# Patient Record
Sex: FEMALE | Race: BLACK OR AFRICAN AMERICAN | NOT HISPANIC OR LATINO | Employment: UNEMPLOYED | URBAN - METROPOLITAN AREA
[De-identification: names, ages, dates, MRNs, and addresses within clinical notes are randomized per-mention and may not be internally consistent; named-entity substitution may affect disease eponyms.]

---

## 2017-05-10 ENCOUNTER — OFFICE VISIT (OUTPATIENT)
Dept: FAMILY MEDICINE | Facility: CLINIC | Age: 57
End: 2017-05-10

## 2017-05-10 VITALS
TEMPERATURE: 98.5 F | HEIGHT: 70 IN | WEIGHT: 277 LBS | OXYGEN SATURATION: 97 % | HEART RATE: 83 BPM | BODY MASS INDEX: 39.65 KG/M2 | SYSTOLIC BLOOD PRESSURE: 104 MMHG | DIASTOLIC BLOOD PRESSURE: 73 MMHG

## 2017-05-10 DIAGNOSIS — R06.01 ORTHOPNEA: ICD-10-CM

## 2017-05-10 DIAGNOSIS — I48.91 ATRIAL FIBRILLATION, UNSPECIFIED TYPE (H): Primary | ICD-10-CM

## 2017-05-10 DIAGNOSIS — R07.9 CHEST PAIN, UNSPECIFIED TYPE: ICD-10-CM

## 2017-05-10 DIAGNOSIS — R06.09 DYSPNEA ON EXERTION: ICD-10-CM

## 2017-05-10 NOTE — PROGRESS NOTES
"Subjective  Michelle Shetty is a 56 year old female with a history including A. Fib/Flutter, CHF who presents with worsening chest pain and shortness of breath. She states over the past several months she has had worsening shortness of breath with exertion and when laying flat. Dyspnea with walking only a few steps. In the past week she has felt like an \"elephant is sitting on her chest\" intermittently. She feels short of breath when laying down, even at an 45 deg angle, which has prevented her from sleeping well. She was previously prescribed Xarelto and Metoprolol for her A.fib in 2015 but has not taken it since 2015. She has not taken any medications or follow up with her PCP in 2 years.    She denies having chest pain currently but does endorse severe shortness of breath with associated fatigue and generalized malaise. Endorses occasional wheezing. Smokes 1 ppd.     ROS: Denies cough, fever. Denies abdominal pain, N/V. Reports mild lower extremity swelling. Denies headache.    Social: 30+ pack year smoker.    Objective  Vitals: /73  Pulse 83  Temp 98.5  F (36.9  C)  Ht 5' 10\" (177.8 cm)  Wt 277 lb (125.6 kg)  SpO2 97%  Breastfeeding? No  BMI 39.75 kg/m2  General: Middle-aged woman. Obese. Mild distress 2/2 to dyspnea.  HEENT: Moist mucous membranes. Sclera non-injected. Poor dentition. Oropharynx without swelling, erythema, or exudate. Anterior cervical LAD.   Heart: Irregularly irregular rate and rhythm. ~150 bpm.  Extremities: Trace lower extremity edema.   Lungs: Prolonged expiration. Clear to auscultation. Diminished at bases.  GI: Obese abdomen. No ridigidity, distension, or guarding.    EKG: A. Flutter with 2nd degree AV block. Rate: 95, QTc: 516     Assessment & Plan  Michelle is a 57 yo female with a hx of A. Fib and CHF presenting with acute worsening of shortness of breath and chest pain. She has not been taking her medications for the past 2 years.     Atrial flutter;  Dyspnea on " exertion, Orthopnea;  Chest Pain:  EKG shows atrial flutter with 2nd degree AV block.  No ST segment elevations. Patient's rate fluctuated between + bpm on exam. No hypoxia. Concern for A. Fib with RVR. Concern for PE, CHF exacerbation.     EMS called and patient transported directly to [facility] Emergency Dept.     Return to clinic for follow up after hospital visit.    Patient precepted with Dr. Castillo.    Kasia Gallegos DO   PGY-1 [facility]  Pager: [phone]

## 2017-05-10 NOTE — MR AVS SNAPSHOT
After Visit Summary   5/10/2017    Michelle Shetty    MRN: [id number]           Patient Information     Date Of Birth          1960        Visit Information        Provider Department      5/10/2017 3:50 PM Kasia Gallegos MD [facility]        Today's Diagnoses     Atrial fibrillation, unspecified type (H)    -  1    Dyspnea on exertion        Orthopnea        Chest pain, unspecified type           Follow-ups after your visit        Who to contact     Please call your clinic at [phone] to:    Ask questions about your health    Make or cancel appointments    Discuss your medicines    Learn about your test results    Speak to your doctor   If you have compliments or concerns about an experience at your clinic, or if you wish to file a complaint, please contact [facility] Patient Relations at [phone] or email us at [email or web address]         Additional Information About Your Visit        MyChart Information     Front Row is an electronic gateway that provides easy, online access to your medical records. With Front Row, you can request a clinic appointment, read your test results, renew a prescription or communicate with your care team.     To sign up for Tagorizet visit the website at [email or web address]   You will be asked to enter the access code listed below, as well as some personal information. Please follow the directions to create your username and password.     Your access code is: [email or web address]  Expires: 8/10/2017  1:36 PM     Your access code will  in 90 days. If you need help or a new code, please contact your [facility] or call [phone] for assistance.        Care EveryWhere ID     This is your Care EveryWhere ID. This could be used by other organizations to access your [place] medical records  [id number]        Your Vitals Were     Pulse Temperature Height Pulse Oximetry  "Breastfeeding? BMI (Body Mass Index)    83 98.5  F (36.9  C) 5' 10\" (177.8 cm) 97% No 39.75 kg/m2       Blood Pressure from Last 3 Encounters:   05/10/17 104/73   09/04/15 115/78   08/25/15 114/87    Weight from Last 3 Encounters:   05/10/17 277 lb (125.6 kg)   09/04/15 269 lb (122 kg)   08/25/15 277 lb (125.6 kg)              Today, you had the following     No orders found for display       Primary Care Provider Office Phone # Fax #    Halima BLANCA Norton [phone]       [facility] [address] [place] [address]        Thank you!     Thank you for choosing [facility]  for your care. Our goal is always to provide you with excellent care. Hearing back from our patients is one way we can continue to improve our services. Please take a few minutes to complete the written survey that you may receive in the mail after your visit with us. Thank you!             Your Updated Medication List - Protect others around you: Learn how to safely use, store and throw away your medicines at www.disposemymeds.org.          This list is accurate as of: 5/10/17 11:59 PM.  Always use your most recent med list.                   Brand Name Dispense Instructions for use    * furosemide 20 MG tablet    LASIX    30 tablet    Take 1 tablet (20 mg) by mouth daily       * furosemide 40 MG tablet    LASIX    60 tablet    Take 1 tablet (40 mg) by mouth daily       lisinopril 5 MG tablet    PRINIVIL/ZESTRIL    90 tablet    Take 1 tablet (5 mg) by mouth daily       melatonin 3 MG tablet     90 tablet    Take 1 tablet (3 mg) by mouth nightly as needed for sleep       metoprolol 100 MG 24 hr tablet    TOPROL-XL    90 tablet    Take 1 tablet (100 mg) by mouth daily Take daily.  You can take an extra dose after 4 hours if your heart rate remains greater than 120 once daily       potassium chloride SA 20 MEQ CR tablet    potassium chloride    90 tablet    Take 1 tablet (20 mEq) by mouth daily       rivaroxaban " ANTICOAGULANT 20 MG Tabs tablet    XARELTO    90 tablet    Take 1 tablet (20 mg) by mouth daily (with dinner) TAKE 1 TABLET BY MOUTH EVERY DAY WITH SUPPER       * Notice:  This list has 2 medication(s) that are the same as other medications prescribed for you. Read the directions carefully, and ask your doctor or other care provider to review them with you.

## 2017-05-10 NOTE — PROGRESS NOTES
Preceptor attestation:  Patient seen and discussed with the resident. Assessment and plan reviewed with resident and agreed upon.  Supervising physician: Liam Castillo  [facility]

## 2017-05-15 ENCOUNTER — TELEPHONE (OUTPATIENT)
Dept: FAMILY MEDICINE | Facility: CLINIC | Age: 57
End: 2017-05-15

## 2017-05-15 NOTE — TELEPHONE ENCOUNTER
Date of discharge: 05/12/2017  Facility of discharge: [facility]  Patient concerns about condition: No concerns at this time.  Patient concerns about medications: No concerns at this time.  Full med reconciliation will be completed at clinic visit.  Patient concerns about transitioning: No concerns at this time.  Clinic office visit appointment date: 05/19/2017  Patient reminded to bring all medications (prescription and over-the-counter) to clinic appointment: Yes    Using the date of discharge as day 1, the 30th day post discharge is 06/10/2017.

## 2017-05-19 ENCOUNTER — OFFICE VISIT (OUTPATIENT)
Dept: FAMILY MEDICINE | Facility: CLINIC | Age: 57
End: 2017-05-19

## 2017-05-19 ENCOUNTER — OFFICE VISIT (OUTPATIENT)
Dept: PHARMACY | Facility: CLINIC | Age: 57
End: 2017-05-19

## 2017-05-19 VITALS
OXYGEN SATURATION: 99 % | BODY MASS INDEX: 38.48 KG/M2 | HEART RATE: 68 BPM | WEIGHT: 268.2 LBS | HEART RATE: 68 BPM | TEMPERATURE: 97.8 F | OXYGEN SATURATION: 99 % | TEMPERATURE: 97.8 F | SYSTOLIC BLOOD PRESSURE: 116 MMHG | SYSTOLIC BLOOD PRESSURE: 116 MMHG | DIASTOLIC BLOOD PRESSURE: 79 MMHG | WEIGHT: 268.2 LBS | BODY MASS INDEX: 38.48 KG/M2 | DIASTOLIC BLOOD PRESSURE: 79 MMHG

## 2017-05-19 DIAGNOSIS — R06.83 SNORING: ICD-10-CM

## 2017-05-19 DIAGNOSIS — R93.89 ABNORMAL FINDING ON IMAGING: ICD-10-CM

## 2017-05-19 DIAGNOSIS — I50.32 CHRONIC DIASTOLIC HEART FAILURE (H): ICD-10-CM

## 2017-05-19 DIAGNOSIS — I50.22 CHRONIC SYSTOLIC CONGESTIVE HEART FAILURE (H): Primary | ICD-10-CM

## 2017-05-19 DIAGNOSIS — I48.0 PAROXYSMAL ATRIAL FIBRILLATION (H): ICD-10-CM

## 2017-05-19 DIAGNOSIS — I48.92 ATRIAL FLUTTER, UNSPECIFIED TYPE (H): ICD-10-CM

## 2017-05-19 LAB — TSH SERPL DL<=0.05 MIU/L-ACNC: 1.63 UIU/ML (ref 0.3–5)

## 2017-05-19 RX ORDER — LISINOPRIL 5 MG/1
5 TABLET ORAL DAILY
Qty: 90 TABLET | Refills: 3 | Status: SHIPPED | OUTPATIENT
Start: 2017-05-19 | End: 2017-06-13

## 2017-05-19 RX ORDER — SAW/PYGEUM/BETA/HERB/D3/B6/ZN 30 MG-25MG
1 CAPSULE ORAL
COMMUNITY
Start: 2017-05-19 | End: 2023-01-10

## 2017-05-19 RX ORDER — FUROSEMIDE 40 MG
40 TABLET ORAL DAILY PRN
Qty: 60 TABLET | Refills: 3 | Status: SHIPPED | OUTPATIENT
Start: 2017-05-19 | End: 2017-06-13

## 2017-05-19 RX ORDER — TRAZODONE HYDROCHLORIDE 50 MG/1
50 TABLET, FILM COATED ORAL
Qty: 90 TABLET | Refills: 3 | COMMUNITY
Start: 2017-05-19 | End: 2017-06-13

## 2017-05-19 RX ORDER — ASPIRIN 81 MG/1
81 TABLET, CHEWABLE ORAL DAILY
Qty: 108 TABLET | Refills: 3 | COMMUNITY
Start: 2017-05-19 | End: 2017-06-13

## 2017-05-19 NOTE — LETTER
May 23, 2017      Michelle Shetty  [address]  [place] [address]        Dear Michelle,    Please see below for your test results.  Normal.     Resulted Orders   Thyroid [employer] ([employer])   Result Value Ref Range    TSH 1.63 0.30 - 5.00 uIU/mL    Narrative    Test performed by:  [facility]  [address] [place] [address]       If you have any questions, please call the clinic to make an appointment.    Sincerely,    Ari Cruz MD

## 2017-05-19 NOTE — MR AVS SNAPSHOT
After Visit Summary   5/19/2017    Michelle Shetty    MRN: [id number]           Patient Information     Date Of Birth          1960        Visit Information        Provider Department      5/19/2017 2:10 PM Joi Muñiz, [facility]        Today's Diagnoses     Atrial flutter, unspecified type (H)        Chronic diastolic heart failure (H)           Follow-ups after your visit        Your next 10 appointments already scheduled     Jun 13, 2017 10:20 AM CDT   Return Visit with Ari Cruz MD   [facility]    [address] [place] [id number]   [phone]              Future tests that were ordered for you today     Open Future Orders        Priority Expected Expires Ordered    Sleep Study Referral Routine  5/19/2018 5/19/2017    US ABDOMEN LIMITED Routine  5/19/2018 5/19/2017            Who to contact     Please call your clinic at [phone] to:    Ask questions about your health    Make or cancel appointments    Discuss your medicines    Learn about your test results    Speak to your doctor   If you have compliments or concerns about an experience at your clinic, or if you wish to file a complaint, please contact [facility] Patient Relations at [phone] or email us at [email or web address]         Additional Information About Your Visit        MyChart Information     OpenSkyt is an electronic gateway that provides easy, online access to your medical records. With webme, you can request a clinic appointment, read your test results, renew a prescription or communicate with your care team.     To sign up for OpenSkyt visit the website at [email or web address]   You will be asked to enter the access code listed below, as well as some personal information. Please follow the directions to create your username and password.     Your access code is: [email or web address]  Expires: 8/10/2017  1:36 PM     Your access code  will  in 90 days. If you need help or a new code, please contact your [facility] or call [phone] for assistance.        Care EveryWhere ID     This is your Care EveryWhere ID. This could be used by other organizations to access your [place] medical records  [id number]        Your Vitals Were     Pulse Temperature Pulse Oximetry BMI (Body Mass Index)          68 97.8  F (36.6  C) (Oral) 99% 38.48 kg/m2         Blood Pressure from Last 3 Encounters:   17 116/79   17 116/79   05/10/17 104/73    Weight from Last 3 Encounters:   17 268 lb 3.2 oz (121.7 kg)   17 268 lb 3.2 oz (121.7 kg)   05/10/17 277 lb (125.6 kg)              Today, you had the following     No orders found for display         Today's Medication Changes          These changes are accurate as of: 17  5:13 PM.  If you have any questions, ask your nurse or doctor.               These medicines have changed or have updated prescriptions.        Dose/Directions    furosemide 40 MG tablet   Commonly known as:  LASIX   This may have changed:    - medication strength  - how much to take  - when to take this  - reasons to take this  - Another medication with the same name was removed. Continue taking this medication, and follow the directions you see here.   Used for:  Chronic systolic congestive heart failure (H)   Changed by:  Ari Cruz MD        Dose:  40 mg   Take 1 tablet (40 mg) by mouth daily as needed   Quantity:  60 tablet   Refills:  3       Melatonin 10 MG Tbcr   This may have changed:  Another medication with the same name was removed. Continue taking this medication, and follow the directions you see here.   Changed by:  Joi Muñiz [facility]        Dose:  1 tablet   Take 1 tablet by mouth nightly as needed   Refills:  0         Stop taking these medicines if you haven't already. Please contact your care team if you have questions.     potassium chloride SA 20 MEQ CR  tablet   Commonly known as:  potassium chloride   Stopped by:  Joi Muñiz [facility]                Where to get your medicines      These medications were sent to [facility]/pharmacy #[id number] - [place], [place] - [address] AT [facility]  [address] [place] [address]    Hours:  24-hours Phone:  [phone]     furosemide 40 MG tablet    lisinopril 5 MG tablet                Primary Care Provider Office Phone # Fax #    aHlima Norton [phone]       [facility] [address] [place] [address]        Thank you!     Thank you for choosing [facility]  for your care. Our goal is always to provide you with excellent care. Hearing back from our patients is one way we can continue to improve our services. Please take a few minutes to complete the written survey that you may receive in the mail after your visit with us. Thank you!             Your Updated Medication List - Protect others around you: Learn how to safely use, store and throw away your medicines at www.disposemymeds.org.          This list is accurate as of: 5/19/17  5:13 PM.  Always use your most recent med list.                   Brand Name Dispense Instructions for use    aspirin 81 MG chewable tablet     108 tablet    Take 1 tablet (81 mg) by mouth daily       furosemide 40 MG tablet    LASIX    60 tablet    Take 1 tablet (40 mg) by mouth daily as needed       lisinopril 5 MG tablet    PRINIVIL/ZESTRIL    90 tablet    Take 1 tablet (5 mg) by mouth daily       Melatonin 10 MG Tbcr      Take 1 tablet by mouth nightly as needed       metoprolol 100 MG 24 hr tablet    TOPROL-XL    90 tablet    Take 1 tablet (100 mg) by mouth daily Take daily.  You can take an extra dose after 4 hours if your heart rate remains greater than 120 once daily       rivaroxaban ANTICOAGULANT 20 MG Tabs tablet    XARELTO    90 tablet    Take 1 tablet (20 mg) by mouth daily (with dinner) TAKE 1 TABLET BY MOUTH EVERY  DAY WITH SUPPER       traZODone 50 MG tablet    DESYREL    90 tablet    Take 1 tablet (50 mg) by mouth nightly as needed for sleep

## 2017-05-19 NOTE — PATIENT INSTRUCTIONS
Atrial Flutter & Heart Failure  - Continue taking Metoprolol and Xarelto  - Start taking Lisinopril. This medication will help strengthen your heart.  - Take Lasix whenever you need to for shortness of breath or difficulty with activity. This includes if you have trouble breathing when laying down.  - Stop taking Aspirin and avoid taking Aleve. These medications will overly thin your blood. We encourage you to see a dentist to help your dental pain.    - Congratulations on quitting smoking! If you need help continuing to be tobacco free, please let us know.  - You should avoid all alcohol. Alcohol will overly thin your blood and may worsen your heart failure.  - Please take your weight daily. Weigh yourself first thing in the morning, before eating and with no clothing. If you gain 3 pounds in one day, or 6 pounds in one week, please call the clinic to let us know.  - Moderate exercise will help to improve your health and control your symptoms. Please consider a regular exercise regimen. If your symptoms worsen with exercise, please let us know.    - Please schedule a sleep study as soon as you can. Sleep apnea may worsen your heart failure.    Return to clinic in 2 weeks, or sooner if you develop new or worse symptoms.    Your referral information has been scheduled for:    [address] [address]  [phone]  Ultrasound  Date: Thursday May 25 2017   Time:  9:00 AM  Nothing to eat or drink 8 hours prior to exam    [facility]  [phone]  Referral has been faxed they will contact pt to schedule    PER PATIENT OK TO LVM WITH APPT./ AMADOU 1:26 PM 5/22/2017

## 2017-05-19 NOTE — PROGRESS NOTES
SUBJECTIVE:  Michelle Shetty comes in today to follow up on her hospitalization.  She last saw me about two years ago.  At that point, she had AFib.  She was started on metoprolol for rate control and Xarelto; somewhere along the way, she stopped taking both and didn't followup.  She presented to [facility] with dyspnea and was found to have AFib w/RVR, and she was admitted to the hospital.  She was ruled out for MI and PE.  Rate was controlled with metoprolol and she was started on Xarelto and she is in today to follow up.  Since she has been home, she is still having some orthopnea and some degree of PND.  She needs two pillows and is waking up once or twice during the night SOB.  She isn't having chest pain.  She isn't having dyspnea with moderate exertion.  She hasn't tried significant exertion since she has been home.     I reviewed and updated as necessary in Epic, the allergies, medications, and problem list.   REVIEW OF SYSTEMS:  No fevers or chills.  No headache.  No change in hearing.  No chest pain.  She doesn't appreciate palpitations.  Other review of systems as documented above.   OBJECTIVE:   VITAL SIGNS:  Noted in Epic.   GENERAL:  She is well-nourished and in no acute distress.   HEENT:  Within normal limits.   NECK:  Supple, w/out lymphadenopathy or stridor.  No JVD is appreciated.   LUNGS:  Clear to auscultation w/out wheezing or crackles.   HEART:  Heart seems to be regular, w/ occasional extra beats.   ABDOMEN:  Mildly obese, soft, and nontender.   EXTREMITIES:  Trace edema at the ankles bilaterally.   EKG does show AFib is rate-controlled.   ASSESSMENT/PLAN:   1.  AFib w/RVR, now in atrial flutter.  We'll continue metoprolol and Xarelto.   2.  Heart failure.  EF is around 40%.  We'll check TSH, and get her scheduled for a sleep study.  We'll increase Lasix to 40 mg daily and add lisinopril five mg daily.  We'll have her check daily weights.  We'll gave her typical instructions on low-salt,  moderate fluid intake, regular exercise, adherence to medications, etc.   3.  Abnormal liver finding on CTA of the pulmonary arteries.  We'll get an abdominal ultrasound.     We'll have her follow up in about two weeks' time.       Total of 40 minutes was spent in face to face contact with patient with > 50% in chart review, counseling and coordination of care.  Options for treatment and/or follow-up care were reviewed with the patient. Michelle Shetty was engaged and actively involved in the decision making process. She verbalized understanding of the options discussed and was satisfied with the final plan.      Ari Cruz

## 2017-05-19 NOTE — MR AVS SNAPSHOT
After Visit Summary   5/19/2017    Michelle Shetty    MRN: [id number]           Patient Information     Date Of Birth          1960        Visit Information        Provider Department      5/19/2017 2:30 PM Ari Cruz MD [facility]        Today's Diagnoses     Chronic systolic congestive heart failure (H)    -  1    Abnormal finding on imaging        Snoring        Paroxysmal atrial fibrillation (H)          Care Instructions    Atrial Flutter & Heart Failure  - Continue taking Metoprolol and Xarelto  - Start taking Lisinopril. This medication will help strengthen your heart.  - Take Lasix whenever you need to for shortness of breath or difficulty with activity. This includes if you have trouble breathing when laying down.  - Stop taking Aspirin and avoid taking Aleve. These medications will overly thin your blood. We encourage you to see a dentist to help your dental pain.    - Congratulations on quitting smoking! If you need help continuing to be tobacco free, please let us know.  - You should avoid all alcohol. Alcohol will overly thin your blood and may worsen your heart failure.  - Please take your weight daily. Weigh yourself first thing in the morning, before eating and with no clothing. If you gain 3 pounds in one day, or 6 pounds in one week, please call the clinic to let us know.  - Moderate exercise will help to improve your health and control your symptoms. Please consider a regular exercise regimen. If your symptoms worsen with exercise, please let us know.    - Please schedule a sleep study as soon as you can. Sleep apnea may worsen your heart failure.    Return to clinic in 2 weeks, or sooner if you develop new or worse symptoms.        Follow-ups after your visit        Additional Services     Sleep Study Referral       Please be aware that coverage of these services is subject to the terms and limitations of your health insurance plan.  Call member services at your  health plan with any benefit or coverage questions.      Please bring the following to your appointment:    >>   List of current medications   >>   This referral request   >>   Any documents/labs given to you for this referral                  Future tests that were ordered for you today     Open Future Orders        Priority Expected Expires Ordered    Sleep Study Referral Routine  2018    US ABDOMEN LIMITED Routine  2018            Who to contact     Please call your clinic at [phone] to:    Ask questions about your health    Make or cancel appointments    Discuss your medicines    Learn about your test results    Speak to your doctor   If you have compliments or concerns about an experience at your clinic, or if you wish to file a complaint, please contact [facility] Patient Relations at [phone] or email us at [email or web address]         Additional Information About Your Visit        UserVoiceharPlacemeter Information     [employer] is an electronic gateway that provides easy, online access to your medical records. With [employer], you can request a clinic appointment, read your test results, renew a prescription or communicate with your care team.     To sign up for [employer] visit the website at [email or web address]   You will be asked to enter the access code listed below, as well as some personal information. Please follow the directions to create your username and password.     Your access code is: [email or web address]  Expires: 8/10/2017  1:36 PM     Your access code will  in 90 days. If you need help or a new code, please contact your [facility] or call [phone] for assistance.        Care EveryWhere ID     This is your Care EveryWhere ID. This could be used by other organizations to access your [place] medical records  [id number]        Your Vitals Were     Pulse Temperature Pulse Oximetry BMI (Body Mass  Index)          68 97.8  F (36.6  C) (Oral) 99% 38.48 kg/m2         Blood Pressure from Last 3 Encounters:   05/19/17 116/79   05/19/17 116/79   05/10/17 104/73    Weight from Last 3 Encounters:   05/19/17 268 lb 3.2 oz (121.7 kg)   05/19/17 268 lb 3.2 oz (121.7 kg)   05/10/17 277 lb (125.6 kg)              We Performed the Following     EKG 12-lead complete w/read - Clinics     Thyroid [place] ([facility])          Today's Medication Changes          These changes are accurate as of: 5/19/17  4:15 PM.  If you have any questions, ask your nurse or doctor.               These medicines have changed or have updated prescriptions.        Dose/Directions    * furosemide 20 MG tablet   Commonly known as:  LASIX   This may have changed:  Another medication with the same name was added. Make sure you understand how and when to take each.   Used for:  Unspecified diastolic heart failure   Changed by:  Halima Norton MD        Dose:  20 mg   Take 1 tablet (20 mg) by mouth daily   Quantity:  30 tablet   Refills:  0       * furosemide 40 MG tablet   Commonly known as:  LASIX   This may have changed:  Another medication with the same name was added. Make sure you understand how and when to take each.   Used for:  Dyspnea on exertion   Changed by:  Halima Norton MD        Dose:  40 mg   Take 1 tablet (40 mg) by mouth daily   Quantity:  60 tablet   Refills:  1       * furosemide 40 MG tablet   Commonly known as:  LASIX   This may have changed:  You were already taking a medication with the same name, and this prescription was added. Make sure you understand how and when to take each.   Used for:  Chronic systolic congestive heart failure (H)   Changed by:  Ari Cruz MD        Dose:  40 mg   Take 1 tablet (40 mg) by mouth daily as needed   Quantity:  60 tablet   Refills:  3       * lisinopril 5 MG tablet   Commonly known as:  PRINIVIL/ZESTRIL   This may have changed:  Another medication with the same name was  added. Make sure you understand how and when to take each.   Used for:  Essential hypertension   Changed by:  Halima Norton MD        Dose:  5 mg   Take 1 tablet (5 mg) by mouth daily   Quantity:  90 tablet   Refills:  3       * lisinopril 5 MG tablet   Commonly known as:  PRINIVIL/ZESTRIL   This may have changed:  You were already taking a medication with the same name, and this prescription was added. Make sure you understand how and when to take each.   Used for:  Chronic systolic congestive heart failure (H)   Changed by:  Ari Cruz MD        Dose:  5 mg   Take 1 tablet (5 mg) by mouth daily   Quantity:  90 tablet   Refills:  3       * Notice:  This list has 5 medication(s) that are the same as other medications prescribed for you. Read the directions carefully, and ask your doctor or other care provider to review them with you.         Where to get your medicines      These medications were sent to [facility]/pharmacy #[id number] - [place], [place] - [facility]ly [facility] [address] [place] [address] [place] [address]    Hours:  24-hours Phone:  [phone]     furosemide 40 MG tablet    lisinopril 5 MG tablet                Primary Care Provider Office Phone # Fax #    Halima Norton [phone]       [facility] [address] [place] [address]        Thank you!     Thank you for choosing [facility]  for your care. Our goal is always to provide you with excellent care. Hearing back from our patients is one way we can continue to improve our services. Please take a few minutes to complete the written survey that you may receive in the mail after your visit with us. Thank you!             Your Updated Medication List - Protect others around you: Learn how to safely use, store and throw away your medicines at www.disposemymeds.org.          This list is accurate as of: 5/19/17  4:15 PM.  Always use your most recent med list.                   Brand Name  Dispense Instructions for use    * furosemide 20 MG tablet    LASIX    30 tablet    Take 1 tablet (20 mg) by mouth daily       * furosemide 40 MG tablet    LASIX    60 tablet    Take 1 tablet (40 mg) by mouth daily       * furosemide 40 MG tablet    LASIX    60 tablet    Take 1 tablet (40 mg) by mouth daily as needed       * lisinopril 5 MG tablet    PRINIVIL/ZESTRIL    90 tablet    Take 1 tablet (5 mg) by mouth daily       * lisinopril 5 MG tablet    PRINIVIL/ZESTRIL    90 tablet    Take 1 tablet (5 mg) by mouth daily       melatonin 3 MG tablet     90 tablet    Take 1 tablet (3 mg) by mouth nightly as needed for sleep       metoprolol 100 MG 24 hr tablet    TOPROL-XL    90 tablet    Take 1 tablet (100 mg) by mouth daily Take daily.  You can take an extra dose after 4 hours if your heart rate remains greater than 120 once daily       potassium chloride SA 20 MEQ CR tablet    potassium chloride    90 tablet    Take 1 tablet (20 mEq) by mouth daily       rivaroxaban ANTICOAGULANT 20 MG Tabs tablet    XARELTO    90 tablet    Take 1 tablet (20 mg) by mouth daily (with dinner) TAKE 1 TABLET BY MOUTH EVERY DAY WITH SUPPER       * Notice:  This list has 5 medication(s) that are the same as other medications prescribed for you. Read the directions carefully, and ask your doctor or other care provider to review them with you.

## 2017-05-19 NOTE — PROGRESS NOTES
Transitional Care / Medication Management Note                                                       Michelle was referred by Dr. Cruz for pharmacy services for hospital follow up    MEDICATION REVIEW:  Discussed all medication indications, dosage and effectiveness, adverse effects, and adherence with patient/caregiver.    Pt had meds with them: yes  Pt had med list with them: no  Pt was knowledgeable about meds: yes  Medications set up by: self  Medications administered by someone else (e.g., LTCF): No  Pt uses a medication box or automated dispenser: no  Called pharmacy to obtain or clarify med list:  no  Called HHN or LTCF to obtain or clarify med list:  no  Patient has been seen by PharmD in past 6 months:  no   Needed: no    Medication Discrepancies  Medications on EMR med list that pt is NOT taking:  yes, furosemide, KCl, lisinopril  Medications pt IS taking that are NOT on EMR med list (e.g., from specialist, hospital): yes, aspirin  OTC meds/ dietary supplements pt taking on own that are NOT on EMR med list:  none  Dosage listed differently than how patient is taking: none  Frequency listed differently than how patient is taking: none  Duplicate medication on list (two occurrences of the same medication):  yes, furosemide  TOTAL NUMBER OF MEDICATION DISCREPANCIES:  5    The following medications were added in the hospital:    Rivaroxaban, aspirin, trazodone  The following medications were discontinued in the hospital:    none  The following medications had dose/frequency changes in the hospital:    not    Subjective                                                       Patient reports the following problems or concerns with their medications:  none  Patient reports the following adverse reactions to medications:  none  Pt reports missing doses:  never  Additional subjective information (e.g., reason for visit, frequency of PRNs, reasons meds were D/C ed):    Pt hospitalized at [facility] 5/10-5/12  for aflutter with second degree heart block; Pt also has cardiomyopathy and CHF.    Objective                                                       Patient Active Problem List   Diagnosis     Atrial flutter (H)     Diastolic heart failure (H)       Current Outpatient Prescriptions   Medication Sig Dispense Refill     aspirin 81 MG chewable tablet Take 1 tablet (81 mg) by mouth daily 108 tablet 3     traZODone (DESYREL) 50 MG tablet Take 1 tablet (50 mg) by mouth nightly as needed for sleep 90 tablet 3     Melatonin 10 MG TBCR Take 1 tablet by mouth nightly as needed       furosemide (LASIX) 40 MG tablet Take 1 tablet (40 mg) by mouth daily as needed 60 tablet 3     lisinopril (PRINIVIL/ZESTRIL) 5 MG tablet Take 1 tablet (5 mg) by mouth daily 90 tablet 3     metoprolol (TOPROL-XL) 100 MG 24 hr tablet Take 1 tablet (100 mg) by mouth daily Take daily.  You can take an extra dose after 4 hours if your heart rate remains greater than 120 once daily 90 tablet 3     [DISCONTINUED] lisinopril (PRINIVIL,ZESTRIL) 5 MG tablet Take 1 tablet (5 mg) by mouth daily 90 tablet 3     [DISCONTINUED] furosemide (LASIX) 40 MG tablet Take 1 tablet (40 mg) by mouth daily 60 tablet 1     rivaroxaban ANTICOAGULANT (XARELTO) 20 MG TABS tablet Take 1 tablet (20 mg) by mouth daily (with dinner) TAKE 1 TABLET BY MOUTH EVERY DAY WITH SUPPER 90 tablet 3     [DISCONTINUED] furosemide (LASIX) 20 MG tablet Take 1 tablet (20 mg) by mouth daily 30 tablet 0       Social History   Substance Use Topics     Smoking status: Current Some Day Smoker     Smokeless tobacco: Not on file     Alcohol use Not on file       CrCl cannot be calculated (Patient's most recent sCr result is older than the maximum 90 days allowed.).    Lab Results   Component Value Date    A1C 5.7 01/05/2015     Last Basic Metabolic Panel:  Lab Results   Component Value Date    .9 08/25/2015      Lab Results   Component Value Date    POTASSIUM 4.1 08/25/2015     Lab Results    Component Value Date    CHLORIDE 102.6 08/25/2015     Lab Results   Component Value Date    JOSEMANUEL 9.4 08/25/2015     Lab Results   Component Value Date    CO2 29.2 08/25/2015     Lab Results   Component Value Date    BUN 12.9 08/25/2015     Lab Results   Component Value Date    CR 1.0 08/25/2015     Lab Results   Component Value Date    .6 08/25/2015       BP Readings from Last 3 Encounters:   05/19/17 116/79   05/19/17 116/79   05/10/17 104/73       The 10-year ASCVD risk score (Sheilaava GARAY Jr, et al., 2013) is: 6.3%    Values used to calculate the score:      Age: 56 years      Sex: Female      Is Non- : Yes      Diabetic: No      Tobacco smoker: Yes      Systolic Blood Pressure: 116 mmHg      Is BP treated: Yes      HDL Cholesterol: 55 mg/dL      Total Cholesterol: 167 mg/dL    PHQ-9 score:  No flowsheet data found.              Assessment                                                       1. Atrial flutter, unspecified type (H)    Controlled   - aspirin 81 MG chewable tablet; Take 1 tablet (81 mg) by mouth daily  Dispense: 108 tablet; Refill: 3  -rivaroxaban    2. Chronic diastolic heart failure (H)    In management; however, pt should be on lisinopril d/t heart failure  - aspirin 81 MG chewable tablet; Take 1 tablet (81 mg) by mouth daily  Dispense: 108 tablet; Refill: 3      Plan/Recommendations                                                       Updated medication list in the EMR; deleted meds patient no longer taking and added meds patient is now taking, and changed doses where there was a dose discrepancy.    All medications were reviewed and found to be indicated, effective, safe and convenient/ affordable unless drug therapy problem(s) was/were identified, as are described below.      Completed at this visit    1. Atrial flutter, unspecified type (H)    Continue current meds.  Gave pt coupon to get Xarelto for free  - aspirin 81 MG chewable tablet; Take 1 tablet (81 mg) by  mouth daily  Dispense: 108 tablet; Refill: 3    2. Chronic diastolic heart failure (H)    Add low dose lisinopril.   - aspirin 81 MG chewable tablet; Take 1 tablet (81 mg) by mouth daily  Dispense: 108 tablet; Refill: 3    Options for treatment and/or follow-up care were reviewed with the patient.  Michelle was engaged and actively involved in the decision making process, verbalized understanding of the options discussed, and was satisfied with the final plan.      Follow-up                                                       Patient should follow up in with Dr Cruz.  Patient was provided with written instructions/medication list via AVS.     Dr. Cruz was provided the recommendations above  in clinic today and was available for supervision during this visit and is the authorizing prescriber for this visit through the pharmacist collaborative practice agreement.    Joi Muñiz, PharmD    Drug therapy problems identified  1. Med: Lisinopril - Indication - needs additional therapy - Resolution: Intiate Drug; resolved    # of medical conditions addressed: 2  # of medications addressed: 4  # of medication discrepancies identified: 5  # of DTP identified: 1  Time spent: 20 minutes  Level of service: 2nc

## 2017-05-22 ENCOUNTER — RECORDS - HEALTHEAST (OUTPATIENT)
Dept: ADMINISTRATIVE | Facility: OTHER | Age: 57
End: 2017-05-22

## 2017-05-23 ENCOUNTER — AMBULATORY - HEALTHEAST (OUTPATIENT)
Dept: SLEEP MEDICINE | Facility: CLINIC | Age: 57
End: 2017-05-23

## 2017-06-09 ENCOUNTER — TELEPHONE (OUTPATIENT)
Dept: FAMILY MEDICINE | Facility: CLINIC | Age: 57
End: 2017-06-09

## 2017-06-12 RX ORDER — METOPROLOL SUCCINATE 100 MG/1
100 TABLET, EXTENDED RELEASE ORAL DAILY
Qty: 90 TABLET | Refills: 3 | Status: CANCELLED | OUTPATIENT
Start: 2017-06-12

## 2017-06-13 ENCOUNTER — OFFICE VISIT (OUTPATIENT)
Dept: FAMILY MEDICINE | Facility: CLINIC | Age: 57
End: 2017-06-13

## 2017-06-13 VITALS — TEMPERATURE: 97.7 F | DIASTOLIC BLOOD PRESSURE: 81 MMHG | HEART RATE: 79 BPM | SYSTOLIC BLOOD PRESSURE: 115 MMHG

## 2017-06-13 DIAGNOSIS — I50.22 CHRONIC SYSTOLIC CONGESTIVE HEART FAILURE (H): ICD-10-CM

## 2017-06-13 DIAGNOSIS — I48.20 CHRONIC ATRIAL FIBRILLATION (H): Primary | ICD-10-CM

## 2017-06-13 DIAGNOSIS — I50.32 CHRONIC DIASTOLIC HEART FAILURE (H): Primary | ICD-10-CM

## 2017-06-13 DIAGNOSIS — R73.09 ELEVATED GLUCOSE: ICD-10-CM

## 2017-06-13 LAB
BUN SERPL-MCNC: 16.9 MG/DL (ref 7–19)
CALCIUM SERPL-MCNC: 9 MG/DL (ref 8.5–10.1)
CHLORIDE SERPLBLD-SCNC: 101.5 MMOL/L (ref 98–110)
CO2 SERPL-SCNC: 26.6 MMOL/L (ref 20–32)
CREAT SERPL-MCNC: 1 MG/DL (ref 0.5–1)
GFR SERPL CREATININE-BSD FRML MDRD: 61 ML/MIN/1.7 M2
GLUCOSE SERPL-MCNC: 120.2 MG'DL (ref 70–99)
POTASSIUM SERPL-SCNC: 3.9 MMOL/DL (ref 3.2–4.6)
SODIUM SERPL-SCNC: 135.4 MMOL/L (ref 132–142)

## 2017-06-13 RX ORDER — FUROSEMIDE 40 MG
40 TABLET ORAL DAILY PRN
Qty: 90 TABLET | Refills: 3 | Status: SHIPPED | OUTPATIENT
Start: 2017-06-13 | End: 2019-03-21

## 2017-06-13 RX ORDER — LISINOPRIL 5 MG/1
5 TABLET ORAL DAILY
Qty: 90 TABLET | Refills: 3 | Status: SHIPPED | OUTPATIENT
Start: 2017-06-13 | End: 2017-11-20

## 2017-06-13 RX ORDER — METOPROLOL SUCCINATE 100 MG/1
100 TABLET, EXTENDED RELEASE ORAL DAILY
Qty: 90 TABLET | Refills: 3 | Status: SHIPPED | OUTPATIENT
Start: 2017-06-13 | End: 2017-12-12

## 2017-06-13 NOTE — MR AVS SNAPSHOT
After Visit Summary   2017    Michelle Shetty    MRN: [id number]           Patient Information     Date Of Birth          1960        Visit Information        Provider Department      2017 10:40 AM Halima Norton MD [facility]        Today's Diagnoses     Chronic atrial fibrillation (H)    -  1    Chronic systolic congestive heart failure (H)        Elevated glucose           Follow-ups after your visit        Who to contact     Please call your clinic at [phone] to:    Ask questions about your health    Make or cancel appointments    Discuss your medicines    Learn about your test results    Speak to your doctor   If you have compliments or concerns about an experience at your clinic, or if you wish to file a complaint, please contact [facility] Patient Relations at [phone] or email us at [email or web address]         Additional Information About Your Visit        MyChart Information     [employer] is an electronic gateway that provides easy, online access to your medical records. With [employer], you can request a clinic appointment, read your test results, renew a prescription or communicate with your care team.     To sign up for [employer] visit the website at [email or web address]   You will be asked to enter the access code listed below, as well as some personal information. Please follow the directions to create your username and password.     Your access code is: [email or web address]  Expires: 8/10/2017  1:36 PM     Your access code will  in 90 days. If you need help or a new code, please contact your [facility] or call [phone] for assistance.        Care EveryWhere ID     This is your Care EveryWhere ID. This could be used by other organizations to access your [place] medical records  [id number]        Your Vitals Were     Pulse Temperature                79 97.7  F (36.5  C) (Oral)            Blood Pressure from Last 3 Encounters:   06/13/17 115/81   05/19/17 116/79   05/19/17 116/79    Weight from Last 3 Encounters:   05/19/17 268 lb 3.2 oz (121.7 kg)   05/19/17 268 lb 3.2 oz (121.7 kg)   05/10/17 277 lb (125.6 kg)              We Performed the Following     Basic Metabolic Panel ([place])          Today's Medication Changes          These changes are accurate as of: 6/13/17 11:59 PM.  If you have any questions, ask your nurse or doctor.               Start taking these medicines.        Dose/Directions    order for DME   Used for:  Chronic diastolic heart failure (H)   Started by:  Halima Norton MD        Blood pressure cuff   Quantity:  1 Units   Refills:  0         These medicines have changed or have updated prescriptions.        Dose/Directions    metoprolol 100 MG 24 hr tablet   Commonly known as:  TOPROL-XL   This may have changed:  additional instructions   Used for:  Chronic systolic congestive heart failure (H)   Changed by:  Halima Norton MD        Dose:  100 mg   Take 1 tablet (100 mg) by mouth daily Take daily.  May take another dose after 4 hours if heart rate remains greater than 120 once daily   Quantity:  90 tablet   Refills:  3       rivaroxaban ANTICOAGULANT 20 MG Tabs tablet   Commonly known as:  XARELTO   This may have changed:  additional instructions   Used for:  Chronic atrial fibrillation (H)   Changed by:  Halima Norton MD        Dose:  20 mg   Take 1 tablet (20 mg) by mouth daily (with dinner)   Quantity:  90 tablet   Refills:  3         Stop taking these medicines if you haven't already. Please contact your care team if you have questions.     aspirin 81 MG chewable tablet   Stopped by:  Halima Norton MD           traZODone 50 MG tablet   Commonly known as:  DESYREL   Stopped by:  Halima Norton MD                Where to get your medicines      These medications were sent to [facility]/pharmacy #[id number] - [place], [place] - [facility] [address] AT  [facility]  [address], [place] [address]    Hours:  24-hours Phone:  [phone]     furosemide 40 MG tablet    lisinopril 5 MG tablet    metoprolol 100 MG 24 hr tablet    rivaroxaban ANTICOAGULANT 20 MG Tabs tablet         Some of these will need a paper prescription and others can be bought over the counter.  Ask your nurse if you have questions.     Bring a paper prescription for each of these medications     order for DME                Primary Care Provider Office Phone # Fax #    Halima RIOS MD [place] [phone]       [facility] [address] [place] [address]        Equal Access to Services     ASHLEY DALY : Hadii aad ku hadasho Soomaali, waaxda luqadaha, qaybta kaalmada adeegyada, checo jessica . So [facility] [phone].    ATENCIÓN: Si habla español, tiene a morfin disposición servicios gratuitos de asistencia lingüística. Llame al [phone].    We comply with applicable federal civil rights laws and [place] laws. We do not discriminate on the basis of race, color, national origin, age, disability sex, sexual orientation or gender identity.            Thank you!     Thank you for choosing [facility]  for your care. Our goal is always to provide you with excellent care. Hearing back from our patients is one way we can continue to improve our services. Please take a few minutes to complete the written survey that you may receive in the mail after your visit with us. Thank you!             Your Updated Medication List - Protect others around you: Learn how to safely use, store and throw away your medicines at www.disposemymeds.org.          This list is accurate as of: 6/13/17 11:59 PM.  Always use your most recent med list.                   Brand Name Dispense Instructions for use Diagnosis    furosemide 40 MG tablet    LASIX    90 tablet    Take 1 tablet (40 mg) by mouth daily as needed    Chronic systolic congestive  heart failure (H)       lisinopril 5 MG tablet    PRINIVIL/ZESTRIL    90 tablet    Take 1 tablet (5 mg) by mouth daily    Chronic systolic congestive heart failure (H)       Melatonin 10 MG Tbcr      Take 1 tablet by mouth nightly as needed        metoprolol 100 MG 24 hr tablet    TOPROL-XL    90 tablet    Take 1 tablet (100 mg) by mouth daily Take daily.  May take another dose after 4 hours if heart rate remains greater than 120 once daily    Chronic systolic congestive heart failure (H)       order for DME     1 Units    Blood pressure cuff    Chronic diastolic heart failure (H)       rivaroxaban ANTICOAGULANT 20 MG Tabs tablet    XARELTO    90 tablet    Take 1 tablet (20 mg) by mouth daily (with dinner)    Chronic atrial fibrillation (H)

## 2017-06-13 NOTE — LETTER
June 14, 2017      Michelle Shetty  [address]  [facility] [address]        Please see below for your test results.    Resulted Orders   Basic Metabolic Panel ([facility])   Result Value Ref Range    Urea Nitrogen 16.9 7.0 - 19.0 mg/dL    Calcium 9.0 8.5 - 10.1 mg/dL    Chloride 101.5 98.0 - 110.0 mmol/L    Carbon Dioxide 26.6 20.0 - 32.0 mmol/L    Creatinine 1.0 0.5 - 1.0 mg/dL    Glucose 120.2 (H) 70.0 - 99.0 mg'dL    Potassium 3.9 3.2 - 4.6 mmol/dL    Sodium 135.4 132.0 - 142.0 mmol/L    GFR Estimate 61.0 >60.0 mL/min/1.7 m2    GFR Estimate If Black 73.8 >60.0 mL/min/1.7 m2     Dear Ms. Shetty,    Your labs looked overall good.  Your kidney tests were normal, and your electrolytes (sodium, potassium, etc) were normal.    Your sugar was high again.  It was 120, and it should be under 100.  The lab was not able to add-on the diabetes test.  That's called an A1c.  I would recommend having that checked next time you are in clinic.    Halima Norton MD

## 2017-06-13 NOTE — TELEPHONE ENCOUNTER
Asked to sign RX for Dr Norton as EMR down prior to completion of encounter today.  Michelle chart reviewed for refill request.    Diagnoses and all orders for this visit:    Chronic diastolic heart failure (H)  -     order for DME; Blood pressure cuff      Best wishes,  Lefty Barlow MD

## 2017-06-13 NOTE — PROGRESS NOTES
"SUBJECTIVE  Michelle Shetty is a 56 year old female with past medical history significant for CHF and a-fib, who presents to clinic for a refill of her medications and to discuss multiple complaints.      Per patient, she originally had been tracking her weight daily, but over the past few weeks, she only takes it when she feels a little \"puffy\".  If she notices that her weight is increased, she will take one of her Furosemide pills.  On average, she has taken this medication 3-4x over the past month and feels that her symptoms have been well controlled.  She continues to have orthopnea (unchanged) but no SOB or LE edema.  Of note, she recently quit smoking.  She is scheduled for a sleep study test in two days.  Patient question if she needs the testing as her sleep has improved recently (going to bed earlier, sleeping in later) leading to an improvement of her fatigue.  She denies any apneic episodes; however, her , notes she has a tendency to snore and may at times wake herself up from this.    Additionally, patient has had 2-3 episodes of brief head pressure (no pain) over the past month.  Episodes will start suddenly and tend to occur at night when she is getting ready for bed.  Last episode was relieved with Ibuprofen, which was taken as patient wondered if it could be related to her teeth given her history of poor dentition.  Aside from her teeth, she questions if the pressure could be due to high blood pressures.      For the past month, patient has been experiencing a sore in the right nostril.  The area is said to feel like a paper cut.  Vaseline has been used at times to treat it.  She has noticed some purulent drainage from the area.  Patient is frustrated as it is not healing; however, she does note that when it scabs she will pick at it as she does not like the feeling.  No fevers or chills.      Patient Active Problem List   Diagnosis     Atrial flutter (H)     Diastolic heart failure (H) "       Others present at the visit:   ([place])    Presents for:  Medication refill, nasal sore, head pressure    REVIEW OF SYSTEMS:  10 point ROS conducted.  Pertinent positives are mentioned in the history above.  Remainder of ROS otherwise negative.      OBJECTIVE:  Vitals: /81 (BP Location: Right arm, Patient Position: Chair)  Pulse 79  Temp 97.7  F (36.5  C) (Oral)  BMI= There is no height or weight on file to calculate BMI.  General:  Well appearing.  Sitting comfortably in a chair.  NAD.  HEENT:  Excoriation in the right medial nostril.  No lesions appreciated in the left nostril.  No cervical lymphadenopathy.  Lungs:  Clear to auscultation bilaterally.  Cardiovascular:  Heart rate mostly regular with an occasional extra beat.  No murmurs, gallops, or rubs.  Neurological:  Normal gait.    Psych:  Normal mood and affect      ASSESSMENT AND PLAN:    Ms. Shetty is a 56 y.o. Female with a PMHx of CHF and atrial fibrillation, presented to clinic for medication refill and with multiple complaints.  Overall, has been feeling well and CHF well controlled on current medication regimen.      Chronic atrial fibrillation:  Stable.  No new complaints.   -   Continue metoprolol and Xarelto.    Chronic systolic congestive heart failure:  EF around 40%.  TSH normal.  Sleep study pending later this week.  Symptoms have been well controlled.           -     Encouraged patient to check her weight regularly.  -     Continue Lisinopril, Fuorsemide, and metoprolol.    -     Basic Metabolic Panel pending.    Elevated glucose:  Per records does not appear to have had a diabetic workup but has had documented elevated blood sugars.  Given a-fib Hx and prior labs, will pursue an evaluation.    -     Hemoglobin A1c pending.    Nasal Lesion:  Ongoing over the past month with occasional drainage.  Patient notes picking at the area.  No signs of active infection.            -      Bacitracin ointment on the lesion daily.    Poor  Dentition:  Hx of cavities and tooth extractions. Has not seen a dentist in many years.  Provided patient with information on local dental clinics.    Follow up in 3 months.    IMargarita, MS4 served as scribe for the services provided by Halima Norton MD      The medical student acted as a scribe and the encounter documented above was performed completely by me and the documentation accurately reflects the work I have performed today. Halima Norton MD

## 2017-06-14 NOTE — PROGRESS NOTES
Dear Ms. Shetty,    Your labs looked overall good.  Your kidney tests were normal, and your electrolytes (sodium, potassium, etc) were normal.    Your sugar was high again.  It was 120, and it should be under 100.  The lab was not able to add-on the diabetes test.  That's called an A1c.  I would recommend having that checked next time you are in clinic.    Halima Norton MD

## 2017-11-20 DIAGNOSIS — I50.22 CHRONIC SYSTOLIC CONGESTIVE HEART FAILURE (H): ICD-10-CM

## 2017-11-24 RX ORDER — LISINOPRIL 5 MG/1
5 TABLET ORAL DAILY
Qty: 90 TABLET | Refills: 3 | Status: SHIPPED | OUTPATIENT
Start: 2017-11-24 | End: 2018-05-29

## 2017-12-04 ENCOUNTER — OFFICE VISIT (OUTPATIENT)
Dept: FAMILY MEDICINE | Facility: CLINIC | Age: 57
End: 2017-12-04

## 2017-12-04 VITALS
HEART RATE: 77 BPM | TEMPERATURE: 97.6 F | WEIGHT: 265.25 LBS | DIASTOLIC BLOOD PRESSURE: 76 MMHG | OXYGEN SATURATION: 98 % | HEIGHT: 69 IN | SYSTOLIC BLOOD PRESSURE: 111 MMHG | BODY MASS INDEX: 39.29 KG/M2

## 2017-12-04 DIAGNOSIS — I50.32 CHRONIC DIASTOLIC HEART FAILURE (H): Primary | ICD-10-CM

## 2017-12-04 DIAGNOSIS — I48.92 ATRIAL FLUTTER, UNSPECIFIED TYPE (H): ICD-10-CM

## 2017-12-04 DIAGNOSIS — B37.9 CANDIDA INFECTION: ICD-10-CM

## 2017-12-04 DIAGNOSIS — G47.00 INSOMNIA, UNSPECIFIED TYPE: ICD-10-CM

## 2017-12-04 LAB
ALBUMIN SERPL-MCNC: 4.3 MG/DL (ref 3.3–4.9)
ALP SERPL-CCNC: 80.9 U/L (ref 40–150)
ALT SERPL-CCNC: 19.5 U/L (ref 0–45)
AST SERPL-CCNC: 13.7 U/L (ref 0–45)
BILIRUB SERPL-MCNC: 0.3 MG/DL (ref 0.2–1.3)
BILIRUBIN DIRECT: 0.1 MG/DL (ref 0–0.2)
BNP SERPL-MCNC: 163 PG/ML (ref 0–87)
BNP SERPL-MCNC: 168 PG/ML (ref 0–87)
BUN SERPL-MCNC: 14.9 MG/DL (ref 7–19)
CALCIUM SERPL-MCNC: 9.3 MG/DL (ref 8.5–10.1)
CHLORIDE SERPLBLD-SCNC: 104.4 MMOL/L (ref 98–110)
CO2 SERPL-SCNC: 30.9 MMOL/L (ref 20–32)
CREAT SERPL-MCNC: 0.9 MG/DL (ref 0.5–1)
GFR SERPL CREATININE-BSD FRML MDRD: 68.6 ML/MIN/1.7 M2
GLUCOSE SERPL-MCNC: 110.2 MG'DL (ref 70–99)
INR PPP: 1.8 (ref 0.9–1.1)
POTASSIUM SERPL-SCNC: 4.1 MMOL/DL (ref 3.2–4.6)
PROT SERPL-MCNC: 7.2 G/DL (ref 6.8–8.8)
SODIUM SERPL-SCNC: 140.6 MMOL/L (ref 132–142)

## 2017-12-04 RX ORDER — CLOTRIMAZOLE 1 %
CREAM (GRAM) TOPICAL 2 TIMES DAILY
Qty: 60 G | Refills: 3 | Status: SHIPPED | OUTPATIENT
Start: 2017-12-04 | End: 2017-12-22

## 2017-12-04 RX ORDER — TRAZODONE HYDROCHLORIDE 50 MG/1
50 TABLET, FILM COATED ORAL AT BEDTIME
Qty: 90 TABLET | Refills: 1 | Status: SHIPPED | OUTPATIENT
Start: 2017-12-04 | End: 2019-03-21

## 2017-12-04 ASSESSMENT — ANXIETY QUESTIONNAIRES
6. BECOMING EASILY ANNOYED OR IRRITABLE: MORE THAN HALF THE DAYS
2. NOT BEING ABLE TO STOP OR CONTROL WORRYING: MORE THAN HALF THE DAYS
1. FEELING NERVOUS, ANXIOUS, OR ON EDGE: SEVERAL DAYS
3. WORRYING TOO MUCH ABOUT DIFFERENT THINGS: MORE THAN HALF THE DAYS
5. BEING SO RESTLESS THAT IT IS HARD TO SIT STILL: SEVERAL DAYS
GAD7 TOTAL SCORE: 11
7. FEELING AFRAID AS IF SOMETHING AWFUL MIGHT HAPPEN: SEVERAL DAYS

## 2017-12-04 ASSESSMENT — PATIENT HEALTH QUESTIONNAIRE - PHQ9
5. POOR APPETITE OR OVEREATING: MORE THAN HALF THE DAYS
SUM OF ALL RESPONSES TO PHQ QUESTIONS 1-9: 12

## 2017-12-04 NOTE — PATIENT INSTRUCTIONS
Pulse oximeter [email or web address] == start at $15        Legal referral has been sent to Karis Alas from [facility].  She will review, advise, and contact the patient.  Mary Barahona    12/04/17    Referral sent to [facility]  Phone: [phone]  Fax: [phone]  Clinic will contact patient to schedule  es December 4, 2017

## 2017-12-04 NOTE — LETTER
December 6, 2017      Michelle Shetty  [address]  [place] [address]        Dear Tammy Martinez.    Please see below for your test results.    Resulted Orders   BNP ([facility])   Result Value Ref Range     (H) 0 - 87 pg/mL    Narrative    Test performed by:  [facility]  [address] [place] [address]   Basic Metabolic Panel ([facility])  - Results < 1 hr   Result Value Ref Range    Urea Nitrogen 14.9 7.0 - 19.0 mg/dL    Calcium 9.3 8.5 - 10.1 mg/dL    Chloride 104.4 98.0 - 110.0 mmol/L    Carbon Dioxide 30.9 20.0 - 32.0 mmol/L    Creatinine 0.9 0.5 - 1.0 mg/dL    Glucose 110.2 (H) 70.0 - 99.0 mg'dL    Potassium 4.1 3.2 - 4.6 mmol/dL    Sodium 140.6 132.0 - 142.0 mmol/L    GFR Estimate 68.6 >60.0 mL/min/1.7 m2    GFR Estimate If Black 83.0 >60.0 mL/min/1.7 m2   HEPATIC PANEL ([facility])   Result Value Ref Range    Albumin 4.3 3.3 - 4.9 mg/dL    Alkaline Phosphatase 80.9 40.0 - 150.0 U/L    ALT 19.5 0.0 - 45.0 U/L    AST 13.7 0.0 - 45.0 U/L    Bilirubin Direct 0.1 0.0 - 0.2 mg/dL    Bilirubin Total 0.3 0.2 - 1.3 mg/dL    Protein Total 7.2 6.8 - 8.8 g/dL   INR ([facility])   Result Value Ref Range    INR 1.80 (H) 0.90 - 1.10    Narrative    Test performed by:  [facility]  [address] [place] [address]  INR Therapeutic Ranges:  Mech. Valve 2.5-3.5  Post Surg.  2.0-3.0  DVT/PE      2.0-3.0   BNP ([facility])   Result Value Ref Range     (H) 0 - 87 pg/mL    Narrative    Test performed by:  [facility] [address] [place] [address]       If you have any questions, please call the clinic to make an appointment.    Sincerely,    Ari Cruz MD

## 2017-12-04 NOTE — NURSING NOTE
Primary Care PTSD Screen    In your life, have you ever had any experience that was so frightening, horrible, or upsetting that, in the past month, you...    1.) Have had nightmares about it or thought about it when you did not want to? No    2.) Tried hard not to think about it or went out of your way to avoid situations that remind you of it? Yes    3.) Were constantly on guard, watchful, or easily startled? Yes    4.) Felt numb or detached from others, activities, or your surroundings? No

## 2017-12-04 NOTE — PROGRESS NOTES
Michelle Shetty comes in for follow up on a few different things, which is 1) persistent atrial fibrillation which is rate controlled.  She is on Xarelto and  metoprolol, and she does fairly well with that.  2) She has heart failure on the basis of reduced ejection fraction and she is on medications for that.  She is taking her medications as directed, although she didn't receive the imaging or the abdominal ultrasound or the Sleep Study .        Patient says she has been under a lot of stress recently related to her father having a stroke and then was in the  hospital for some months, (perhaps some of this time was spent in a rehab hospital).  He was discharged to a nursing home for two months at which point he was discharged to her home.  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  She has to help when he awakens and goes to the bathroom in the middle of the night.  She helps feed him, she dresses him, she takes him to the bathroom, and she.   She states that she is exhausted from providing total care for her dad around the clock, and although there are 2 sons (brothers of hers) they don't participate at all in the care of her father.  She is wondering what to do about all of this.   I reviewed and updated as necessary in Epic, the allergies, medications, and problem list.   ROS:  I did a review of systems. She is not sleeping well at night.  She is not able to fall asleep because of thoughts that are racing all night.  No dyspnea with rigor.  She does get short of breath with higher levels of activity but she is working out at the gym.   No urinary, GI symptoms.     VITAL SIGNS:  Noted in Epic.    HEENT:  Within normal limits.    NECK:  Supple, w/out lymphadenopathy.   LUNGS:  Clear to auscultation w/out wheezing or crackles.    HEART:  Heart sounds regular, w/ occasional extra beats.    ABDOMEN:  Mildly obese, soft, and nontender. It looks like she has intertrigo under the breast and her apendectomy  "scar on the right.     EXTREMITIES:  Trace edema at the ankles bilaterally.   ASSESSMENT/PLAN:    1.  Heart failure with reduced ejection fraction.   We'll check an echocardiogram   2.  Persistent atrial fibrillation.  Continue the Xarelto and metoprolol.   3.  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  I suggested in-home care such as \"[facility] [place]".  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  I had social work and our  also talk with her.      Total of 45 minutes was spent in face to face contact with patient with > 50% in counseling and coordination of care.  Options for treatment and/or follow-up care were reviewed with the patient. Michelle Shetty was engaged and actively involved in the decision making process. She verbalized understanding of the options discussed and was satisfied with the final plan.      Ari Cruz      "

## 2017-12-04 NOTE — LETTER
December 4, 2017      Michelle Shetty  [address]  [place] [address]        Dear Tammy Martinez.    Please see below for your test results.    Resulted Orders   BNP ([facility])   Result Value Ref Range     (H) 0 - 87 pg/mL    Narrative    Test performed by:  [facility]  [address] [place] [address]   Basic Metabolic Panel ([facility])  - Results < 1 hr   Result Value Ref Range    Urea Nitrogen 14.9 7.0 - 19.0 mg/dL    Calcium 9.3 8.5 - 10.1 mg/dL    Chloride 104.4 98.0 - 110.0 mmol/L    Carbon Dioxide 30.9 20.0 - 32.0 mmol/L    Creatinine 0.9 0.5 - 1.0 mg/dL    Glucose 110.2 (H) 70.0 - 99.0 mg'dL    Potassium 4.1 3.2 - 4.6 mmol/dL    Sodium 140.6 132.0 - 142.0 mmol/L    GFR Estimate 68.6 >60.0 mL/min/1.7 m2    GFR Estimate If Black 83.0 >60.0 mL/min/1.7 m2   HEPATIC PANEL ([facility])   Result Value Ref Range    Albumin 4.3 3.3 - 4.9 mg/dL    Alkaline Phosphatase 80.9 40.0 - 150.0 U/L    ALT 19.5 0.0 - 45.0 U/L    AST 13.7 0.0 - 45.0 U/L    Bilirubin Direct 0.1 0.0 - 0.2 mg/dL    Bilirubin Total 0.3 0.2 - 1.3 mg/dL    Protein Total 7.2 6.8 - 8.8 g/dL       If you have any questions, please call the clinic to make an appointment.    Sincerely,    Ari Cruz MD

## 2017-12-04 NOTE — LETTER
December 8, 2017      Michelle Shetty  [address]  [place] [address]        Dear Michelle,    Please see below for your test results.    Resulted Orders   BNP ([facility])   Result Value Ref Range     (H) 0 - 87 pg/mL    Narrative    Test performed by:  [facility]  [address] [place] [address]   Basic Metabolic Panel ([facility])  - Results < 1 hr   Result Value Ref Range    Urea Nitrogen 14.9 7.0 - 19.0 mg/dL    Calcium 9.3 8.5 - 10.1 mg/dL    Chloride 104.4 98.0 - 110.0 mmol/L    Carbon Dioxide 30.9 20.0 - 32.0 mmol/L    Creatinine 0.9 0.5 - 1.0 mg/dL    Glucose 110.2 (H) 70.0 - 99.0 mg'dL    Potassium 4.1 3.2 - 4.6 mmol/dL    Sodium 140.6 132.0 - 142.0 mmol/L    GFR Estimate 68.6 >60.0 mL/min/1.7 m2    GFR Estimate If Black 83.0 >60.0 mL/min/1.7 m2   HEPATIC PANEL ([facility])   Result Value Ref Range    Albumin 4.3 3.3 - 4.9 mg/dL    Alkaline Phosphatase 80.9 40.0 - 150.0 U/L    ALT 19.5 0.0 - 45.0 U/L    AST 13.7 0.0 - 45.0 U/L    Bilirubin Direct 0.1 0.0 - 0.2 mg/dL    Bilirubin Total 0.3 0.2 - 1.3 mg/dL    Protein Total 7.2 6.8 - 8.8 g/dL   INR ([facility])   Result Value Ref Range    INR 1.80 (H) 0.90 - 1.10    Narrative    Test performed by:  [facility]  [address] [place] [address]  INR Therapeutic Ranges:  Mech. Valve 2.5-3.5  Post Surg.  2.0-3.0  DVT/PE      2.0-3.0   BNP ([facility])   Result Value Ref Range     (H) 0 - 87 pg/mL    Narrative    Test performed by:  [facility]  [address] [place] [address]     These are expected results.  Please get your echocardiogram and follow up with me as directed.      If you have any questions, please call the clinic to make an appointment.    Sincerely,    Ari Cruz MD

## 2017-12-04 NOTE — MR AVS SNAPSHOT
After Visit Summary   12/4/2017    Michelle Shetty    MRN: [id number]           Patient Information     Date Of Birth          1960        Visit Information        Provider Department      12/4/2017 10:40 AM Ari Cruz MD [facility]        Today's Diagnoses     Chronic diastolic heart failure (H)    -  1    Atrial flutter, unspecified type (H)        Legal aspect        Insomnia, unspecified type        Candida infection          Care Instructions      Pulse oximeter [email or web address] == start at $15          Follow-ups after your visit        Additional Services     Legal Services Referral - [place] only       OTHER PROVIDERS:                  Future tests that were ordered for you today     Open Future Orders        Priority Expected Expires Ordered    Fecal Occult Blood - FIT, iFOB ([facility]) Routine  12/11/2017 12/4/2017    Echocardiogram Complete Routine  12/4/2018 12/4/2017            Who to contact     Please call your clinic at [phone] to:    Ask questions about your health    Make or cancel appointments    Discuss your medicines    Learn about your test results    Speak to your doctor   If you have compliments or concerns about an experience at your clinic, or if you wish to file a complaint, please contact [facility] Patient Relations at [phone] or email us at [email or web address]         Additional Information About Your Visit        MyChart Information     [employer] is an electronic gateway that provides easy, online access to your medical records. With [employer], you can request a clinic appointment, read your test results, renew a prescription or communicate with your care team.     To sign up for Advanced [employer] visit the website at [email or web address]   You will be asked to enter the access code listed below, as well as some personal information. Please follow the directions to create your username and password.     Your access code  "is: [email or web address]  Expires: 3/4/2018 11:46 AM     Your access code will  in 90 days. If you need help or a new code, please contact your [facility] or call [phone] for assistance.        Care EveryWhere ID     This is your Care EveryWhere ID. This could be used by other organizations to access your [place] medical records  [id number]        Your Vitals Were     Pulse Temperature Height Pulse Oximetry Breastfeeding? BMI (Body Mass Index)    77 97.6  F (36.4  C) (Oral) 5' 9.49\" (176.5 cm) 98% No 38.62 kg/m2       Blood Pressure from Last 3 Encounters:   17 111/76   17 115/81   17 116/79    Weight from Last 3 Encounters:   17 265 lb 4 oz (120.3 kg)   17 268 lb 3.2 oz (121.7 kg)   17 268 lb 3.2 oz (121.7 kg)              We Performed the Following     ADMIN VACCINE, EACH ADDITIONAL     ADMIN VACCINE, INITIAL     Basic Metabolic Panel ([facility])  - Results < 1 hr     Basic Metabolic Panel ([facility])  - Results < 1 hr     BNP ([facility])     BNP ([facility])     FLU VAC QUADRIVLENT SPLIT VIRUS IM 0.5ml dosage     HEPATIC PANEL ([facility])     INR ([facility])     Legal Services Referral - [facility]     Pneumococcal vaccine 23 valent PPSV23  (Pneumovax) [67098]          Today's Medication Changes          These changes are accurate as of: 17 11:46 AM.  If you have any questions, ask your nurse or doctor.               Start taking these medicines.        Dose/Directions    clotrimazole 1 % cream   Commonly known as:  LOTRIMIN   Used for:  Candida infection   Started by:  Ari Cruz MD        Apply topically 2 times daily   Quantity:  60 g   Refills:  3       traZODone 50 MG tablet   Commonly known as:  DESYREL   Used for:  Insomnia, unspecified type   Started by:  Ari Cruz MD        Dose:  50 mg   Take 1 tablet (50 mg) by mouth At Bedtime   Quantity:  90 tablet   Refills:  1         These medicines have changed or have updated " prescriptions.        Dose/Directions    * order for DME   This may have changed:  Another medication with the same name was added. Make sure you understand how and when to take each.   Used for:  Chronic diastolic heart failure (H)   Changed by:  Halima Norton MD        Blood pressure cuff   Quantity:  1 Units   Refills:  0       * order for DME   This may have changed:  You were already taking a medication with the same name, and this prescription was added. Make sure you understand how and when to take each.   Used for:  Atrial flutter, unspecified type (H)   Changed by:  Ari Cruz MD        Equipment being ordered: pulse oximeter   Quantity:  1 Device   Refills:  0       * Notice:  This list has 2 medication(s) that are the same as other medications prescribed for you. Read the directions carefully, and ask your doctor or other care provider to review them with you.         Where to get your medicines      These medications were sent to [facility]/pharmacy #[id number] - [place], [place] - [address].  [address] [place] [address], [facility] [address]     Phone:  [phone]     clotrimazole 1 % cream    traZODone 50 MG tablet         Some of these will need a paper prescription and others can be bought over the counter.  Ask your nurse if you have questions.     Bring a paper prescription for each of these medications     order for DME                Primary Care Provider Office Phone # Fax #    Ari Cruz [phone]       [address] [address]        Equal Access to Services     [facility]: Hadii jose huffo [place], waaxda luqadaha, qaybta kaalmada adedanniyada, checo isaacs. So [facility] [phone].    ATENCIÓN: Si habla español, tiene a morfin disposición servicios gratuitos de asistencia lingüística. Llame al [phone].    We comply with applicable federal civil rights laws and [place] laws. We do not discriminate on  the basis of race, color, national origin, age, disability, sex, sexual orientation, or gender identity.            Thank you!     Thank you for choosing [facility]  for your care. Our goal is always to provide you with excellent care. Hearing back from our patients is one way we can continue to improve our services. Please take a few minutes to complete the written survey that you may receive in the mail after your visit with us. Thank you!             Your Updated Medication List - Protect others around you: Learn how to safely use, store and throw away your medicines at www.disposemymeds.org.          This list is accurate as of: 12/4/17 11:46 AM.  Always use your most recent med list.                   Brand Name Dispense Instructions for use Diagnosis    clotrimazole 1 % cream    LOTRIMIN    60 g    Apply topically 2 times daily    Candida infection       furosemide 40 MG tablet    LASIX    90 tablet    Take 1 tablet (40 mg) by mouth daily as needed    Chronic systolic congestive heart failure (H)       lisinopril 5 MG tablet    PRINIVIL/ZESTRIL    90 tablet    Take 1 tablet (5 mg) by mouth daily    Chronic systolic congestive heart failure (H)       Melatonin 10 MG Tbcr      Take 1 tablet by mouth nightly as needed        metoprolol 100 MG 24 hr tablet    TOPROL-XL    90 tablet    Take 1 tablet (100 mg) by mouth daily Take daily.  May take another dose after 4 hours if heart rate remains greater than 120 once daily    Chronic systolic congestive heart failure (H)       * order for DME     1 Units    Blood pressure cuff    Chronic diastolic heart failure (H)       * order for DME     1 Device    Equipment being ordered: pulse oximeter    Atrial flutter, unspecified type (H)       rivaroxaban ANTICOAGULANT 20 MG Tabs tablet    XARELTO    90 tablet    Take 1 tablet (20 mg) by mouth daily (with dinner)    Chronic atrial fibrillation (H)       traZODone 50 MG tablet    DESYREL    90 tablet    Take 1 tablet (50  mg) by mouth At Bedtime    Insomnia, unspecified type       * Notice:  This list has 2 medication(s) that are the same as other medications prescribed for you. Read the directions carefully, and ask your doctor or other care provider to review them with you.

## 2017-12-04 NOTE — LETTER
December 4, 2017      Michelle Shetty  [address]  [place] [address]        Dear Michelle,    Please see below for your test results.    Resulted Orders   Basic Metabolic Panel ([facility])  - Results < 1 hr   Result Value Ref Range    Urea Nitrogen 14.9 7.0 - 19.0 mg/dL    Calcium 9.3 8.5 - 10.1 mg/dL    Chloride 104.4 98.0 - 110.0 mmol/L    Carbon Dioxide 30.9 20.0 - 32.0 mmol/L    Creatinine 0.9 0.5 - 1.0 mg/dL    Glucose 110.2 (H) 70.0 - 99.0 mg'dL    Potassium 4.1 3.2 - 4.6 mmol/dL    Sodium 140.6 132.0 - 142.0 mmol/L    GFR Estimate 68.6 >60.0 mL/min/1.7 m2    GFR Estimate If Black 83.0 >60.0 mL/min/1.7 m2   HEPATIC PANEL (LABDAQ)   Result Value Ref Range    Albumin 4.3 3.3 - 4.9 mg/dL    Alkaline Phosphatase 80.9 40.0 - 150.0 U/L    ALT 19.5 0.0 - 45.0 U/L    AST 13.7 0.0 - 45.0 U/L    Bilirubin Direct 0.1 0.0 - 0.2 mg/dL    Bilirubin Total 0.3 0.2 - 1.3 mg/dL    Protein Total 7.2 6.8 - 8.8 g/dL     Normal.      If you have any questions, please call the clinic to make an appointment.    Sincerely,    Ari Cruz MD

## 2017-12-05 ENCOUNTER — ALLIED HEALTH/NURSE VISIT (OUTPATIENT)
Dept: FAMILY MEDICINE | Facility: CLINIC | Age: 57
End: 2017-12-05

## 2017-12-05 ENCOUNTER — RECORDS - HEALTHEAST (OUTPATIENT)
Dept: ADMINISTRATIVE | Facility: OTHER | Age: 57
End: 2017-12-05

## 2017-12-05 DIAGNOSIS — Z23 ENCOUNTER FOR IMMUNIZATION: Primary | ICD-10-CM

## 2017-12-05 ASSESSMENT — ANXIETY QUESTIONNAIRES: GAD7 TOTAL SCORE: 11

## 2017-12-05 NOTE — PROGRESS NOTES
Social Work Note:    Data and Intervention: SW consulted to meet with patient to discuss caregiver options for patient's elderly father. Present for the visit/conversation was patient and her  Carroll. Patient shares that she is the sole and primary caregiver for her aging father. He has resided in her family home since September 2017, prior to that he was living at a nursing home. She states he signed himself out of the nursing home and she agreed to support him in her home due to his not wanting to pay for the nursing home anymore. Patient states that her father has an abundance of financial resources and assets. She states he was employed by the "[employer] for much of his career and currently reps the benefits of that employment. He receives upward of $4,000 on a monthly basis. This information was relevant for the patient to disclose for the SW to assist with gaging eligibility for waivered programs to support getting PCA services involved.     Patient states that the availability of money to pay for PCA services or alternative housing is not the issue, the issue is that her father refuses to put his money up to pay for help and is awaiting the right assisted living facility to move into. He refuses to leave her home. Patient recently graduated from [employer] with a degree in Human Resources. She wants to get a job but is being held back by being the caregiver for her father. He is not safe or ambulatory enough to be left alone for longer than 2-3 hours at a time. He is incontinent throughout his living areas of the home. She is providing full on PCA tasks, homemaker tasks, and coordination/budgeting/organzing tasks for him. She is not being paid and feels she does not want to get paid at this point. She states she wants a respite but finds that process difficult as she cannot and will not pay for it and her father refuses to spend his money.     Patient's  chimes in that other  family members refuse to offer any help either. He is worried his wife is overworked with being the caregiver. He thinks his father-in-law needs to move out. Patient in agreement but unsure how to navigate that conversation/where to start the conversation with her father about this. 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 Sw discussed finding out what her legal rights are in regards to the situation and at what capacity can she make a decision for her father to sign him up for PCA/homemaker services or sign a lease with an alternative living location. Patient in agreement with speaking with .     Assessment and Plan:     1.) SW provided emotional support and social tips on how to navigate the difficult conversation with her father. SW focused on patient's mental health and her current physical health and used that as a frame of reference to motivate patient to seek change in the current situation. Discussed being more firm in conversation with her father and presenting realistic options for both her and him to see a resolution to this, including creating a timeline in which he selects another place to live or agree's to paying for a caregiver to come into the home alleviate the responsibility on the patient to complete his daily cares.    2.) Patient discussed with  to put a legal referral for patient to discuss rights as POA/health care agent for her father. Briefed clinic , Kiya, on patient's scenario. \    3.) SW will be available for call-in or future appointments patient has at this clinic.         Elham Stanford

## 2017-12-05 NOTE — MR AVS SNAPSHOT
After Visit Summary   2017    Michelle Shetty    MRN: [id number]           Patient Information     Date Of Birth          1960        Visit Information        Provider Department      2017 3:30 PM Nurse, [facility]        Today's Diagnoses     Encounter for immunization    -  1       Follow-ups after your visit        Your next 10 appointments already scheduled     Dec 22, 2017  2:10 PM CST   Return Visit with Ari Cruz MD   [facility] ([facility])    [address] [place] [address]   [phone]              Future tests that were ordered for you today     Open Future Orders        Priority Expected Expires Ordered    Fecal Occult Blood - FIT, iFOB ([facility]) Routine  2017    Echocardiogram Complete Routine  2018            Who to contact     Please call your clinic at [phone] to:    Ask questions about your health    Make or cancel appointments    Discuss your medicines    Learn about your test results    Speak to your doctor   If you have compliments or concerns about an experience at your clinic, or if you wish to file a complaint, please contact [facility] Patient Relations at [phone] or email us at [email or web address]         Additional Information About Your Visit        MyChart Information     Invisticst is an electronic gateway that provides easy, online access to your medical records. With FrenchWeb, you can request a clinic appointment, read your test results, renew a prescription or communicate with your care team.     To sign up for Invisticst visit the website at [email or web address]   You will be asked to enter the access code listed below, as well as some personal information. Please follow the directions to create your username and password.     Your access code is: [email or web address]  Expires: 3/4/2018 11:46 AM     Your access code will  in 90 days. If you need  help or a new code, please contact your [facility] or call [phone] for assistance.        Care EveryWhere ID     This is your Care EveryWhere ID. This could be used by other organizations to access your [facility] medical records  [id number]         Blood Pressure from Last 3 Encounters:   12/04/17 111/76   06/13/17 115/81   05/19/17 116/79    Weight from Last 3 Encounters:   12/04/17 265 lb 4 oz (120.3 kg)   05/19/17 268 lb 3.2 oz (121.7 kg)   05/19/17 268 lb 3.2 oz (121.7 kg)              Today, you had the following     No orders found for display       Primary Care Provider Office Phone # Fax #    Ari Cruz [phone]       [address] [address]        Equal Access to Services     ADRIAN DALY : Hadii jose ku hadasho Soomaali, waaxda luqadaha, qaybta kaalmada adeegyada, checo soliman hayadi jessica . So [facility] [phone].    ATENCIÓN: Si habla español, tiene a morfin disposición servicios gratuitos de asistencia lingüística. Llame al [phone].    We comply with applicable federal civil rights laws and [place] laws. We do not discriminate on the basis of race, color, national origin, age, disability, sex, sexual orientation, or gender identity.            Thank you!     Thank you for choosing [facility]  for your care. Our goal is always to provide you with excellent care. Hearing back from our patients is one way we can continue to improve our services. Please take a few minutes to complete the written survey that you may receive in the mail after your visit with us. Thank you!             Your Updated Medication List - Protect others around you: Learn how to safely use, store and throw away your medicines at www.disposemymeds.org.          This list is accurate as of: 12/5/17  5:27 PM.  Always use your most recent med list.                   Brand Name Dispense Instructions for use Diagnosis     clotrimazole 1 % cream    LOTRIMIN    60 g    Apply topically 2 times daily    Candida infection       furosemide 40 MG tablet    LASIX    90 tablet    Take 1 tablet (40 mg) by mouth daily as needed    Chronic systolic congestive heart failure (H)       lisinopril 5 MG tablet    PRINIVIL/ZESTRIL    90 tablet    Take 1 tablet (5 mg) by mouth daily    Chronic systolic congestive heart failure (H)       Melatonin 10 MG Tbcr      Take 1 tablet by mouth nightly as needed        metoprolol 100 MG 24 hr tablet    TOPROL-XL    90 tablet    Take 1 tablet (100 mg) by mouth daily Take daily.  May take another dose after 4 hours if heart rate remains greater than 120 once daily    Chronic systolic congestive heart failure (H)       * order for DME     1 Units    Blood pressure cuff    Chronic diastolic heart failure (H)       * order for DME     1 Device    Equipment being ordered: pulse oximeter    Atrial flutter, unspecified type (H)       rivaroxaban ANTICOAGULANT 20 MG Tabs tablet    XARELTO    90 tablet    Take 1 tablet (20 mg) by mouth daily (with dinner)    Chronic atrial fibrillation (H)       traZODone 50 MG tablet    DESYREL    90 tablet    Take 1 tablet (50 mg) by mouth At Bedtime    Insomnia, unspecified type       * Notice:  This list has 2 medication(s) that are the same as other medications prescribed for you. Read the directions carefully, and ask your doctor or other care provider to review them with you.

## 2017-12-05 NOTE — NURSING NOTE
"Injectable Influenza Immunization Documentation    1.  Has the patient received the information for the injectable influenza vaccine? YES     2. Is the patient 6 months of age or older? YES     3. Does the patient have any of the following contraindications?         Severe allergy to eggs? No     Severe allergic reaction to previous influenza vaccines? No   Severe allergy to latex? No       History of Guillain-Eros syndrome? No     Currently have a temperature greater than 100.4F? No        4.  Severely egg allergic patients should have flu vaccine eligibility assessed by an MD, RN, or pharmacist, and those who received flu vaccine should be observed for 15 min by an MD, RN, Pharmacist, Medical Technician, or member of clinic staff.\": YES    5. Latex-allergic patients should be given latex-free influenza vaccine. Please reference the Vaccine latex table to determine if your clinic s product is latex-containing.       Vaccination given by Iraida Sanchez CMA          "

## 2017-12-12 DIAGNOSIS — I50.22 CHRONIC SYSTOLIC CONGESTIVE HEART FAILURE (H): ICD-10-CM

## 2017-12-13 ENCOUNTER — TRANSFERRED RECORDS (OUTPATIENT)
Dept: HEALTH INFORMATION MANAGEMENT | Facility: CLINIC | Age: 57
End: 2017-12-13

## 2017-12-13 ENCOUNTER — HOSPITAL ENCOUNTER (OUTPATIENT)
Dept: CARDIOLOGY | Facility: CLINIC | Age: 57
Discharge: HOME | End: 2017-12-13
Attending: FAMILY MEDICINE

## 2017-12-13 DIAGNOSIS — I50.32 CHRONIC DIASTOLIC HEART FAILURE (H): ICD-10-CM

## 2017-12-13 DIAGNOSIS — I50.22 CHRONIC SYSTOLIC CONGESTIVE HEART FAILURE (H): ICD-10-CM

## 2017-12-13 RX ORDER — METOPROLOL SUCCINATE 100 MG/1
100 TABLET, EXTENDED RELEASE ORAL DAILY
Qty: 90 TABLET | Refills: 3 | Status: SHIPPED | OUTPATIENT
Start: 2017-12-13 | End: 2017-12-13

## 2017-12-14 LAB
AORTIC VALVE MEAN VELOCITY: 81.9 CM/S
ASCENDING AORTA: 3.6 CM
AV DIMENSIONLESS INDEX VTI: 0.9
AV MEAN GRADIENT: 3 MMHG
AV PEAK GRADIENT: 5.8 MMHG
AV VALVE AREA: 4 CM2
AV VELOCITY RATIO: 0.8
DOP CALC AO PEAK VEL: 120 CM/S
DOP CALC AO VTI: 20.8 CM
DOP CALC LVOT AREA: 4.52 CM2
DOP CALC LVOT DIAMETER: 2.4 CM
DOP CALC LVOT PEAK VEL: 96.3 CM/S
DOP CALC LVOT STROKE VOLUME: 82.3 CM3
DOP CALCLVOT PEAK VEL VTI: 18.2 CM
ECHO EJECTION FRACTION ESTIMATED: 55 %
EJECTION FRACTION: 44 % (ref 55–75)
FRACTIONAL SHORTENING: 9.8 % (ref 28–44)
INTERVENTRICULAR SEPTUM IN END DIASTOLE: 1.2 CM (ref 0.6–0.9)
IVS/PW RATIO: 0.9
LA AREA 1: 33 CM2
LA AREA 2: 32.5 CM2
LEFT ATRIUM LENGTH: 6.8 CM
LEFT ATRIUM SIZE: 5 CM
LEFT ATRIUM VOLUME: 134.1 CM3
LEFT VENTRICLE DIASTOLIC VOLUME: 84 CM3 (ref 46–106)
LEFT VENTRICLE SYSTOLIC VOLUME: 47 CM3 (ref 14–42)
LEFT VENTRICULAR INTERNAL DIMENSION IN DIASTOLE: 5.1 CM (ref 3.8–5.2)
LEFT VENTRICULAR INTERNAL DIMENSION IN SYSTOLE: 4.6 CM (ref 2.2–3.5)
LEFT VENTRICULAR MASS: 270.1 G
LEFT VENTRICULAR OUTFLOW TRACT MEAN GRADIENT: 2 MMHG
LEFT VENTRICULAR OUTFLOW TRACT MEAN VELOCITY: 64.9 CM/S
LEFT VENTRICULAR OUTFLOW TRACT PEAK GRADIENT: 4 MMHG
LEFT VENTRICULAR POSTERIOR WALL IN END DIASTOLE: 1.4 CM (ref 0.6–0.9)
MV AVERAGE E/E' RATIO: 7.8 CM/S
MV DECELERATION TIME: 174 MS
MV E'TISSUE VEL-LAT: 12.6 CM/S
MV E'TISSUE VEL-MED: 8.97 CM/S
MV LATERAL E/E' RATIO: 6.7
MV MEDIAL E/E' RATIO: 9.4
MV PEAK E VELOCITY: 84.4 CM/S
TRICUSPID REGURGITATION PEAK PRESSURE GRADIENT: 24.2 MMHG
TRICUSPID VALVE ANULAR PLANE SYSTOLIC EXCURSION: 1.9 CM
TRICUSPID VALVE PEAK REGURGITANT VELOCITY: 246 CM/S

## 2017-12-15 RX ORDER — METOPROLOL SUCCINATE 100 MG/1
100 TABLET, EXTENDED RELEASE ORAL DAILY
Qty: 90 TABLET | Refills: 3 | Status: SHIPPED | OUTPATIENT
Start: 2017-12-15 | End: 2018-05-29

## 2017-12-22 ENCOUNTER — OFFICE VISIT (OUTPATIENT)
Dept: FAMILY MEDICINE | Facility: CLINIC | Age: 57
End: 2017-12-22
Payer: COMMERCIAL

## 2017-12-22 VITALS
SYSTOLIC BLOOD PRESSURE: 107 MMHG | TEMPERATURE: 97.9 F | OXYGEN SATURATION: 100 % | DIASTOLIC BLOOD PRESSURE: 73 MMHG | HEART RATE: 74 BPM

## 2017-12-22 DIAGNOSIS — I50.22 CHRONIC SYSTOLIC HEART FAILURE (H): ICD-10-CM

## 2017-12-22 DIAGNOSIS — I48.92 ATRIAL FLUTTER, UNSPECIFIED TYPE (H): Primary | ICD-10-CM

## 2017-12-22 NOTE — MR AVS SNAPSHOT
After Visit Summary   2017    Michelle Shetty    MRN: [id number]           Patient Information     Date Of Birth          1960        Visit Information        Provider Department      2017 2:10 PM Ari Cruz MD [facility]        Today's Diagnoses     Atrial flutter, unspecified type (H)    -  1    Chronic diastolic heart failure (H)          Care Instructions                            Pulse oximeter            Follow-ups after your visit        Who to contact     Please call your clinic at [phone] to:    Ask questions about your health    Make or cancel appointments    Discuss your medicines    Learn about your test results    Speak to your doctor   If you have compliments or concerns about an experience at your clinic, or if you wish to file a complaint, please contact [facility] Patient Relations at [phone] or email us at [email or web address]         Additional Information About Your Visit        MyChart Information     Backspaces is an electronic gateway that provides easy, online access to your medical records. With Backspaces, you can request a clinic appointment, read your test results, renew a prescription or communicate with your care team.     To sign up for Tablelist Inct visit the website at [email or web address]   You will be asked to enter the access code listed below, as well as some personal information. Please follow the directions to create your username and password.     Your access code is: [email or web address]  Expires: 3/4/2018 11:46 AM     Your access code will  in 90 days. If you need help or a new code, please contact your [facility] or call [phone] for assistance.        Care EveryWhere ID     This is your Care EveryWhere ID. This could be used by other organizations to access your [facility] medical records  [id number]        Your Vitals Were     Pulse Temperature Pulse  Oximetry Breastfeeding?          74 97.9  F (36.6  C) (Oral) 100% No         Blood Pressure from Last 3 Encounters:   12/22/17 107/73   12/04/17 111/76   06/13/17 115/81    Weight from Last 3 Encounters:   12/04/17 265 lb 4 oz (120.3 kg)   05/19/17 268 lb 3.2 oz (121.7 kg)   05/19/17 268 lb 3.2 oz (121.7 kg)              Today, you had the following     No orders found for display         Today's Medication Changes          These changes are accurate as of: 12/22/17  3:19 PM.  If you have any questions, ask your nurse or doctor.               These medicines have changed or have updated prescriptions.        Dose/Directions    order for DME   This may have changed:  Another medication with the same name was removed. Continue taking this medication, and follow the directions you see here.   Used for:  Atrial flutter, unspecified type (H)   Changed by:  Ari Cruz MD        Equipment being ordered: pulse oximeter   Quantity:  1 Device   Refills:  0         Stop taking these medicines if you haven't already. Please contact your care team if you have questions.     clotrimazole 1 % cream   Commonly known as:  LOTRIMIN   Stopped by:  Ari Cruz MD                    Primary Care Provider Office Phone # Fax #    Ari Cruz [phone]       [place] [address]        Equal Access to Services     ADRIAN DALY AH: Hadii aad ku hadasho Soomaali, waaxda luqadaha, qaybta kaalmada adeegyada, waxay idiin hayadi jessica . So [facility] [phone].    ATENCIÓN: Si habla español, tiene a morfin disposición servicios gratuitos de asistencia lingüística. Llame al [phone].    We comply with applicable federal civil rights laws and [place] laws. We do not discriminate on the basis of race, color, national origin, age, disability, sex, sexual orientation, or gender identity.            Thank you!     Thank you for choosing [facility]  for your care. Our goal  is always to provide you with excellent care. Hearing back from our patients is one way we can continue to improve our services. Please take a few minutes to complete the written survey that you may receive in the mail after your visit with us. Thank you!             Your Updated Medication List - Protect others around you: Learn how to safely use, store and throw away your medicines at www.disposemymeds.org.          This list is accurate as of: 12/22/17  3:19 PM.  Always use your most recent med list.                   Brand Name Dispense Instructions for use Diagnosis    furosemide 40 MG tablet    LASIX    90 tablet    Take 1 tablet (40 mg) by mouth daily as needed    Chronic systolic congestive heart failure (H)       HAIR SKIN AND NAILS FORMULA PO      Take 3 tablets by mouth daily        lisinopril 5 MG tablet    PRINIVIL/ZESTRIL    90 tablet    Take 1 tablet (5 mg) by mouth daily    Chronic systolic congestive heart failure (H)       Melatonin 10 MG Tbcr      Take 1 tablet by mouth nightly as needed        metoprolol 100 MG 24 hr tablet    TOPROL-XL    90 tablet    Take 1 tablet (100 mg) by mouth daily Take daily.  May take another dose after 4 hours if heart rate remains greater than 120 once daily    Chronic systolic congestive heart failure (H)       order for DME     1 Device    Equipment being ordered: pulse oximeter    Atrial flutter, unspecified type (H)       rivaroxaban ANTICOAGULANT 20 MG Tabs tablet    XARELTO    90 tablet    Take 1 tablet (20 mg) by mouth daily (with dinner)    Chronic atrial fibrillation (H)       traZODone 50 MG tablet    DESYREL    90 tablet    Take 1 tablet (50 mg) by mouth At Bedtime    Insomnia, unspecified type

## 2018-01-04 PROBLEM — I50.22 CHRONIC SYSTOLIC HEART FAILURE (H): Status: ACTIVE | Noted: 2017-12-04

## 2018-01-04 NOTE — PROGRESS NOTES
Subjective: Michelle Shetty is a 57 year old who presents today for follow-up.  She has heart failure with reduced ejection fraction.  Her recent echocardiogram demonstrated an ejection fraction of 55%.  This is improved while taking medications.  At this point she reports fairly minimal symptoms with activity.    We also following up on the tremendous stress in her life related to her father who is ill and who lives with her and her  in their home.  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  Her father is to wealthy to qualify for any [employer] programs and does not want to spend any money of his own to provide in-home care for himself.    She has permanent atrial fibrillation and her rate is controlled on her beta-blocker and she takes Xarelto as directed as an anticoagulant.  Her [id number] score is 2 based on her sex and HF.    PMHX/PSHX/MEDS/ALLERGIES/SHX/FHX reviewed and updated in Epic.   ROS:   General: No fevers, chills   Head: No headache   Ears: No acute change in hearing.   CV: No chest pain or palpitations.   Resp: No significantshortness of breath. No cough. No hemoptysis.   Objective: /73 (BP Location: Left arm, Patient Position: Sitting, Cuff Size: Adult Large)  Pulse 74  Temp 97.9  F (36.6  C) (Oral)  SpO2 100%  Breastfeeding? No   Gen: Well nourished and in NAD   HEENT: TMs normal color and landmarks, nasopharynx pink and moist, oropharynx pink and moist  CV: RRR - no murmurs, rubs, or gallups,   Pulm: CTAB, no wheezes/rales/rhonchi, good air entry   ABD: soft, nontender, BS intact  Extrem: no cyanosis, edema or clubbing   Psych: Euthymic    Assessment/ Plan:  1. Atrial flutter, unspecified type (H)  Stable.    2. Chronic systolic heart failure (H)  Now with improved EF on GMDT.  She does not need ICD and is very stable, rarely needing her diuretic.  She needs appropriate immunizations and chronic tx with her current meds.    Total of 40 minutes was spent in face to face contact  with patient with > 50% in counseling and coordination of care.  Options for treatment and/or follow-up care were reviewed with the patient. Michelle Shetty was engaged and actively involved in the decision making process. She verbalized understanding of the options discussed and was satisfied with the final plan.    RTC in 3 months for follow up of HFrEF or sooner if develops new or worsening symptoms.    Ari Cruz

## 2018-01-17 ENCOUNTER — TELEPHONE (OUTPATIENT)
Dept: FAMILY MEDICINE | Facility: CLINIC | Age: 58
End: 2018-01-17

## 2018-01-17 DIAGNOSIS — I48.20 CHRONIC ATRIAL FIBRILLATION (H): Primary | ICD-10-CM

## 2018-01-17 RX ORDER — WARFARIN SODIUM 5 MG/1
5 TABLET ORAL DAILY
Qty: 30 TABLET | Refills: 0 | Status: SHIPPED | OUTPATIENT
Start: 2018-01-17 | End: 2019-03-21

## 2018-01-17 RX ORDER — WARFARIN SODIUM 5 MG/1
5 TABLET ORAL DAILY
Qty: 30 TABLET | Refills: 0 | Status: SHIPPED | OUTPATIENT
Start: 2018-01-17 | End: 2018-01-17

## 2018-01-17 NOTE — TELEPHONE ENCOUNTER
Phone call to patient with the plan and she states that she does not want to do an injection and would rather take an oral medication. This information was given to Dr. Cruz by phone and he states the patient can take coumadin 5 mg per day and to come in the clinic for INR check on Friday. This plan was called to the patient and she is agreeable to this. Order placed for both the coumadin and INR. /ALLEN Bishop

## 2018-01-17 NOTE — TELEPHONE ENCOUNTER
[facility] phone call message- general phone call:    Reason for call: her insurance told her she has to get a PA from her doctor to get her Xarelto 20mg filled/approved. She has been out since Monday night this medication she  is not supposed to miss a dose and she has already missed one. She is not sure what she needs to do. Till the PA can get approved.    Return call needed: Yes    OK to leave a message on voice mail? Yes    Primary language: English      needed? No    Call taken on January 17, 2018 at 1:45 PM by Red Luu

## 2018-01-17 NOTE — TELEPHONE ENCOUNTER
Nurse spoke with Dr. Cruz in clinic and questioned if Eloquist 5 mg twice daily would be covered by patient's insurance. If so the patient could take that until the PA was completed, if not he would like Lovenox 1 mg/kg called in for the patient in the mean time. /ALLEN Bishop

## 2018-01-19 ENCOUNTER — DOCUMENTATION ONLY (OUTPATIENT)
Dept: FAMILY MEDICINE | Facility: CLINIC | Age: 58
End: 2018-01-19

## 2018-01-19 DIAGNOSIS — I48.20 CHRONIC ATRIAL FIBRILLATION (H): ICD-10-CM

## 2018-01-19 LAB — INR PPP: 1

## 2018-01-19 NOTE — TELEPHONE ENCOUNTER
Dr. Muñiz saw patient today and will discuss a treatment plan. Patient's insurance will change to  on  02/01/18 which would most likely cover the xarelto. lr  Routed to Dr. Cruz

## 2018-01-19 NOTE — TELEPHONE ENCOUNTER
Nurse tried to complete PA over the phone for a more immediate determination but still requires a 24 hour turn around time. /ALLEN Bishop  Routed to Dr. Muñiz

## 2018-01-19 NOTE — PROGRESS NOTES
INR Questionnaire    Patient has been maintained on Xarelto for afib for for about 3 years.  At the start of the year, with her Medicaid renewal, she was dropped from [facility], and put on [facility]. She will switch to [facility] 2/1/18.  Unfortunately Xarelto (or any other DOAC) is not covered by MA and the PA was denied as she has not had a trial of warfarin.  Xarelto is covered by [facility], so we are starting warfarin to bridge her to February 1st.  She is not interested in Lovenox per previous phone notes.    1) Dose of warfarin: 5mg x 1 dose  2) Strengths of warfarin at home: 5mg  3) Missed doses in last week: No  4) Extra doses in last week: No  5) More or less salads/green vegetables/broccoli in last 2 weeks: No  6) Started or stopped any other medications in last 3 weeks: No  7) In last 1-2 weeks, any problems with bleeding: No  8) Need a refill of warfarin? No    I did provide some education about warfarin to patient.  She is familiar with it as her dad is on it for AFib.  I didn't spend too much time on education as patient will only be on it a short time.    INR: 1.0  Goal INR: 2.0-3.0  New dose: increase to 7.5mg/day  Recheck INR: 3 or 4 days  Informed patient: in clinic    Joi Muñiz, PharmD

## 2018-01-25 ENCOUNTER — DOCUMENTATION ONLY (OUTPATIENT)
Dept: FAMILY MEDICINE | Facility: CLINIC | Age: 58
End: 2018-01-25

## 2018-01-25 DIAGNOSIS — I48.92 ATRIAL FLUTTER, UNSPECIFIED TYPE (H): Primary | ICD-10-CM

## 2018-01-25 DIAGNOSIS — I48.92 ATRIAL FLUTTER, UNSPECIFIED TYPE (H): ICD-10-CM

## 2018-01-25 LAB — INR PPP: 1.7

## 2018-01-25 NOTE — PROGRESS NOTES
INR Questionnaire    1) Dose of warfarin: 7.5mg/day  2) Strengths of warfarin at home: 5  3) Missed doses in last week: No  4) Extra doses in last week: No  5) More or less salads/green vegetables/broccoli in last 2 weeks: No  6) Started or stopped any other medications in last 3 weeks: No  7) In last 1-2 weeks, any problems with bleeding: No  8) Need a refill of warfarin? No  9) What phone number is best: chart  10) Is it OK to leave a detailed message with instructions? n/a    INR: 1.7  Goal INR: 2.0-3.0  New dose: cont 7.5mg/day (b/c not at steady state)  Recheck INR: n/a - patient will be getting [employer] MA on 2/1.  Instructed her to take the warfarin til then and fill the Xarelto on 2/1 and switch.  Informed patient: in clinic    Joi Muñiz, SerjioD

## 2018-02-01 ENCOUNTER — TELEPHONE (OUTPATIENT)
Dept: FAMILY MEDICINE | Facility: CLINIC | Age: 58
End: 2018-02-01

## 2018-02-01 DIAGNOSIS — I48.20 CHRONIC ATRIAL FIBRILLATION (H): ICD-10-CM

## 2018-02-01 DIAGNOSIS — I48.92 ATRIAL FLUTTER, UNSPECIFIED TYPE (H): ICD-10-CM

## 2018-02-01 LAB — INR PPP: 1.7

## 2018-02-01 NOTE — TELEPHONE ENCOUNTER
Patient came in for an INR.  It was 1.7.  As it is now 2/1, asked Christa Harp to confirm whether she has [employer] MA yet. She does.  Called [facility] on [address]. The confirmed that she does have [employer] MA, but [facility] stopped taking [employer] insurance today.     Relayed this to patient and gave her hard copy rx of Xarelto to take to pharmacy of her choice. Told her that she can stop warfarin and start Xarelto same day.    Joi Muñiz, Pharm.D.

## 2018-03-10 ENCOUNTER — HEALTH MAINTENANCE LETTER (OUTPATIENT)
Age: 58
End: 2018-03-10

## 2018-03-26 ENCOUNTER — TELEPHONE (OUTPATIENT)
Dept: FAMILY MEDICINE | Facility: CLINIC | Age: 58
End: 2018-03-26

## 2018-03-26 DIAGNOSIS — I50.22 CHRONIC SYSTOLIC CONGESTIVE HEART FAILURE (H): ICD-10-CM

## 2018-03-26 NOTE — TELEPHONE ENCOUNTER
[facility] Family Medicine phone call message- patient requesting a refill:    Full Medication Name:metoprolol (TOPROL-XL) 100 MG 24 hr tablet Take 1 tablet (100 mg) by mouth daily Take daily.  May take another dose after 4 hours if heart rate remains greater than 120 once daily, lisinopril (PRINIVIL/ZESTRIL) 5 MG tablet - Take 1 tablet (5 mg) by mouth daily and rivaroxaban ANTICOAGULANT (XARELTO) 20 MG TABS tablet - Take 1 tablet (20 mg) by mouth daily (with dinner).  It appears the patient still has refills.  Pharmacy confirmed as [facility] fax is [phone] - [phone]   : Yes    Additional Comments: Pt will be out of medication by April 1.    OK to leave a message on voice mail? Yes    Primary language: English      needed? No    Call taken on March 26, 2018 at 11:31 AM by Chrissy Christensen

## 2018-04-05 NOTE — TELEPHONE ENCOUNTER
Called over to the pharmacy to make sure there was refills, there were refills so they will refill them for the patient.    Called the patient and left a message informing her the medication was refilled and sent to her pharmacy. Any questions please contact us or the pharmacy.

## 2018-05-11 ENCOUNTER — TELEPHONE (OUTPATIENT)
Dept: FAMILY MEDICINE | Facility: CLINIC | Age: 58
End: 2018-05-11

## 2018-05-11 NOTE — TELEPHONE ENCOUNTER
[facility] Family Medicine phone call message- general phone call:    Reason for call: She is getting 7 teeth pulled on Tuesday the 15th and she is currently on Xarelto  5mg she needs to know how many days does she needs to be off of it before her appointment on Tuesday.Please give a call back asap.    Return call needed: Yes    OK to leave a message on voice mail? Yes    Primary language: English      needed? No    Call taken on May 11, 2018 at 10:54 AM by Red Luu

## 2018-05-11 NOTE — TELEPHONE ENCOUNTER
Discussed this w/ Dr. Cruz, pt should stop zarelto 48 hrs before her procedure and restart zarelto the day after her procedure. Gave instructions to pt and advised her to call the clinic w/ any questions or concerns. /ALLEN Carrillo

## 2018-05-29 DIAGNOSIS — I50.22 CHRONIC SYSTOLIC CONGESTIVE HEART FAILURE (H): ICD-10-CM

## 2018-05-29 NOTE — TELEPHONE ENCOUNTER
Called over to the pharmacy to see what was going on with the patients medication list, they said the needed some new Insurance information. Told them that the patient has [employer] through the state they said they do not accepted that insurance.     I called the patient left a message to talk to them about the missing information but it went straight to voicemail. Left a message to call the clinic back.

## 2018-05-29 NOTE — TELEPHONE ENCOUNTER
[facility]     Dr Cole helped out and sent a 1 month supply of medication over for the patient.

## 2018-05-29 NOTE — TELEPHONE ENCOUNTER
The pt called to let the Dr know that the Pharmacy would not give her a refill on the metoprolol and lisinopril and would like the nurse to call back

## 2018-05-30 RX ORDER — LISINOPRIL 5 MG/1
5 TABLET ORAL DAILY
Qty: 90 TABLET | Refills: 3 | Status: SHIPPED | OUTPATIENT
Start: 2018-05-30 | End: 2019-03-21

## 2018-05-30 RX ORDER — METOPROLOL SUCCINATE 100 MG/1
100 TABLET, EXTENDED RELEASE ORAL DAILY
Qty: 90 TABLET | Refills: 3 | Status: SHIPPED | OUTPATIENT
Start: 2018-05-30 | End: 2019-03-21

## 2018-05-31 DIAGNOSIS — I50.22 CHRONIC SYSTOLIC HEART FAILURE (H): Primary | ICD-10-CM

## 2018-05-31 DIAGNOSIS — I48.92 ATRIAL FLUTTER, UNSPECIFIED TYPE (H): ICD-10-CM

## 2018-05-31 RX ORDER — LISINOPRIL 5 MG/1
5 TABLET ORAL DAILY
Qty: 90 TABLET | Refills: 3 | Status: SHIPPED | OUTPATIENT
Start: 2018-05-31 | End: 2019-03-21

## 2018-05-31 RX ORDER — METOPROLOL SUCCINATE 100 MG/1
100 TABLET, EXTENDED RELEASE ORAL DAILY
Qty: 120 TABLET | Refills: 3 | Status: SHIPPED | OUTPATIENT
Start: 2018-05-31 | End: 2019-03-21

## 2019-03-21 ENCOUNTER — RECORDS - HEALTHEAST (OUTPATIENT)
Dept: ADMINISTRATIVE | Facility: OTHER | Age: 59
End: 2019-03-21

## 2019-03-21 ENCOUNTER — OFFICE VISIT (OUTPATIENT)
Dept: FAMILY MEDICINE | Facility: CLINIC | Age: 59
End: 2019-03-21
Payer: COMMERCIAL

## 2019-03-21 VITALS
TEMPERATURE: 97.8 F | DIASTOLIC BLOOD PRESSURE: 84 MMHG | OXYGEN SATURATION: 99 % | HEIGHT: 70 IN | BODY MASS INDEX: 38.71 KG/M2 | RESPIRATION RATE: 24 BRPM | SYSTOLIC BLOOD PRESSURE: 130 MMHG | WEIGHT: 270.4 LBS | HEART RATE: 92 BPM

## 2019-03-21 DIAGNOSIS — I48.20 CHRONIC ATRIAL FIBRILLATION (H): ICD-10-CM

## 2019-03-21 DIAGNOSIS — I50.22 CHRONIC SYSTOLIC CONGESTIVE HEART FAILURE (H): ICD-10-CM

## 2019-03-21 DIAGNOSIS — I50.22 CHRONIC SYSTOLIC HEART FAILURE (H): Primary | ICD-10-CM

## 2019-03-21 DIAGNOSIS — Z00.00 PREVENTATIVE HEALTH CARE: ICD-10-CM

## 2019-03-21 DIAGNOSIS — R00.2 PALPITATIONS: ICD-10-CM

## 2019-03-21 DIAGNOSIS — I48.92 ATRIAL FLUTTER, UNSPECIFIED TYPE (H): ICD-10-CM

## 2019-03-21 DIAGNOSIS — B35.4 TINEA CORPORIS: ICD-10-CM

## 2019-03-21 LAB
ANION GAP SERPL CALCULATED.3IONS-SCNC: 9 MMOL/L (ref 5–18)
BUN SERPL-MCNC: 16 MG/DL (ref 8–22)
CALCIUM SERPL-MCNC: 9.8 MG/DL (ref 8.5–10.5)
CHLORIDE SERPL-SCNC: 105 MMOL/L (ref 98–107)
CO2 SERPL-SCNC: 25 MMOL/L (ref 22–31)
CREAT SERPL-MCNC: 0.86 MG/DL (ref 0.6–1.1)
GLUCOSE SERPL-MCNC: 97 MG/DL (ref 70–125)
MAGNESIUM SERPL-MCNC: 2.1 MG/DL (ref 1.8–2.6)
POTASSIUM SERPL-SCNC: 3.8 MMOL/L (ref 3.5–5)
SODIUM SERPL-SCNC: 139 MMOL/L (ref 136–145)
TSH SERPL DL<=0.05 MIU/L-ACNC: 0.47 UIU/ML (ref 0.3–5)

## 2019-03-21 RX ORDER — PRENATAL VIT 91/IRON/FOLIC/DHA 28-975-200
COMBINATION PACKAGE (EA) ORAL 2 TIMES DAILY
Qty: 42 G | Refills: 1 | Status: SHIPPED | OUTPATIENT
Start: 2019-03-21 | End: 2020-04-08

## 2019-03-21 RX ORDER — FUROSEMIDE 40 MG
40 TABLET ORAL DAILY PRN
Qty: 90 TABLET | Refills: 3 | Status: SHIPPED | OUTPATIENT
Start: 2019-03-21 | End: 2020-04-02

## 2019-03-21 RX ORDER — METOPROLOL SUCCINATE 100 MG/1
100 TABLET, EXTENDED RELEASE ORAL DAILY
Qty: 90 TABLET | Refills: 3 | Status: SHIPPED | OUTPATIENT
Start: 2019-03-21 | End: 2019-09-09

## 2019-03-21 RX ORDER — LISINOPRIL 5 MG/1
5 TABLET ORAL DAILY
Qty: 90 TABLET | Refills: 3 | Status: SHIPPED | OUTPATIENT
Start: 2019-03-21 | End: 2020-03-03

## 2019-03-21 ASSESSMENT — PATIENT HEALTH QUESTIONNAIRE - PHQ9
SUM OF ALL RESPONSES TO PHQ QUESTIONS 1-9: 12
5. POOR APPETITE OR OVEREATING: MORE THAN HALF THE DAYS

## 2019-03-21 ASSESSMENT — ANXIETY QUESTIONNAIRES
3. WORRYING TOO MUCH ABOUT DIFFERENT THINGS: NEARLY EVERY DAY
1. FEELING NERVOUS, ANXIOUS, OR ON EDGE: SEVERAL DAYS
5. BEING SO RESTLESS THAT IT IS HARD TO SIT STILL: NOT AT ALL
GAD7 TOTAL SCORE: 13
7. FEELING AFRAID AS IF SOMETHING AWFUL MIGHT HAPPEN: MORE THAN HALF THE DAYS
IF YOU CHECKED OFF ANY PROBLEMS ON THIS QUESTIONNAIRE, HOW DIFFICULT HAVE THESE PROBLEMS MADE IT FOR YOU TO DO YOUR WORK, TAKE CARE OF THINGS AT HOME, OR GET ALONG WITH OTHER PEOPLE: SOMEWHAT DIFFICULT
6. BECOMING EASILY ANNOYED OR IRRITABLE: MORE THAN HALF THE DAYS
2. NOT BEING ABLE TO STOP OR CONTROL WORRYING: NEARLY EVERY DAY

## 2019-03-21 ASSESSMENT — MIFFLIN-ST. JEOR: SCORE: 1890.75

## 2019-03-21 NOTE — NURSING NOTE
Chief Complaint   Patient presents with     RECHECK     Xarelto Treatment/ Feeling depressed, stressed due family situation, she also reports breathing problems      Simone Eli CMA    MAMMOGRAM  March 21, 2019 faxed order and demographics to [facility] Radiology Scheduling at [phone] who will contact patient to assist.   Simone Eli CMA     FIT TEST  March 21, 2019 FIT TEST was ordered and given to patient. Colonoscopy was declined   Simone Eli CMA

## 2019-03-21 NOTE — PATIENT INSTRUCTIONS
I have hot flashes several times a week. How can I manage them without taking medication?  Answer From Hayde Verdin M.D.    You may be able to reduce the intensity and severity of your hot flashes by controlling your physical environment or adopting certain behaviors.    Since ambient temperatures may affect how frequent and severe your hot flashes are, keeping your environment -- and your body -- cool may help.    To keep from overheating, try these tips:    Remain in cool temperatures or air-conditioned areas.  Keep air circulating around you with a fan or the breeze from an open window.  Dress in layers and remove clothing when you become warmer.  Wear open-weave cotton clothing to allow air to pass over your skin.  Avoid drinking warm beverages, eating spicy foods, using tobacco, consuming caffeine and drinking alcohol.  Certain behavioral strategies also might be worth a try:    Slow, deep breathing -- known as paced respiration -- when you feel a hot flash coming on  Regular practice of relaxation exercises, such as yoga or mindfulness meditation  Acupuncture, a traditional Chinese medicine practice that involves inserting thin needles through your skin  Hypnosis, a mind-body therapy that brings about deep relaxation and heightened focus  Paced respiration and relaxation exercises work best with proper instruction. Ask your doctor for recommendations on where to learn more.    Studies on acupuncture have had mixed results. In some studies, acupuncture improved hot flashes, and in others, no benefit was noted. Acupuncture has few side effects or risks when performed by a trained acupuncturist and may benefit some women. More research is needed, though.    Hypnosis -- useful for managing medical conditions such as pain, anxiety and insomnia -- may relieve hot flashes by reducing anxiety and stress. Preliminary studies provide limited but promising evidence that hypnosis may work to relieve  "hot flashes. Research into this potential therapy continues.    With    Hayde Verdin M.D.  Patient Education   Tips for Sleep Hygiene  \"Sleep hygiene\" means having good sleep habits.Follow these tips to sleep better at night:     Get on a schedule. Go to bed and get up at about the same time every day.    Listen to your body. Only try to sleep when you actually feel tired or sleepy.    Be patient. If you haven't been able to get to sleep after about 30 minutes or more, get up and do something calming or boring until you feel sleepy. Then return to bed and try again.    Don't have caffeine (coffee, tea, cola drinks, chocolate and some medicines), alcohol or nicotine (cigarettes). These can make it harder for you to fall asleep and stay asleep.    Use your bed for sleeping only. That means no TV, computer or homework in bed, especially during the evening and before bedtime.    Don't nap during the day. If you must nap, make sure it is for less than 20 minutes.    Create sleep rituals that remind your body it is time to sleep. Examples include breathing exercises, stretching or reading a book.    Avoid all electronic media (smart phone, computer, tablet) within 2 hours of bed time. The \"blue light\" in these devices activates the part of the brain that keeps you awake.    Dim the lights at night.    Get early morning sources of light (walk in the sunshine) to help set sleep patterns at night.    Try a bath or shower before bed. Having a warm bath 1 to 2 hours before bedtime can help you feel sleepy. Hot baths can make you alert, so be mindful of the temperature.    Don't watch the clock. Checking the clock during the night can wake you up. It can also lead to negative thoughts such as, \"I will never fall asleep,\" which can increase anxiety and sleeplessness.    Use a sleep diary. Track your sleep schedule to know your sleep patterns and to see where you can improve.    Get regular exercise every day. Try " not to do heavy exercise in the 4 hours before bedtime.    Eat a healthy, balanced diet.    Try eating a light, healthy snack before bed, but avoid eating a heavy meal.    Create the right sleeping area. A cool, dark, quiet room is best. If needed, try earplugs, fans and blackout curtains.    Keep your daytime routine the same even if you have a bad night sleep. Avoiding activities the next day can make it harder to sleep.  For informational purposes only. Not to replace the advice of your health care provider.   Copyright   2013 [facility]. All rights reserved. [employer] 995279 - 01/16.     Holter Monitor 48 hour Adult Pediatric and Nuclear Med with Lexiscan (Stress Test)   March 21, 2019 at 4:28 pm [facility] Online Physician Referral Forms successfully completed - they will contact patient/family to schedule. [facility]    MAMMOGRAM  March 21, 2019 faxed order and demographics to [facility] Radiology Scheduling at [phone] who will contact patient to assist.   Simone Eli CMA     FIT TEST  March 21, 2019 FIT TEST was ordered and given to patient.   Simone Eli CMA

## 2019-03-21 NOTE — LETTER
March 21, 2019    RE:  Michelle Shetty  [address]  [place] [address]        Dr. Macedo,    Ms. Diogenes is an established patient of mine with stable heart failure with moderately reduced ejection fraction and permanent atrial fibrillation with a controlled ventricular rate.    She is an appropriate candidate for dental procedures using local anesthesia.  If significant bleeding is anticipated she should stop her Xarelto for 48 hours prior to the procedure and she can restart it the evening after the procedure has been completed.      Please let me know if there are any additional questions about our mutual patient.  You can send my a message in "[employer]".    Cordially,      Ari Cruz MD

## 2019-03-21 NOTE — LETTER
March 25, 2019      Michelle Brown  [address]  [place] [address]        Dear Michelle,    Please see below for your test results.    These results are within the normal range for you.  Please follow up in the clinic as directed.          Resulted Orders   Magnesium ([facility])   Result Value Ref Range    Magnesium 2.1 1.8 - 2.6 mg/dL    Narrative    Test performed by:  [facility] [address] [place] [address]   Thyroid [place] ([facility])   Result Value Ref Range    TSH 0.47 0.30 - 5.00 uIU/mL    Narrative    Test performed by:  [facility] [address] [place] [address]   Basic Metabolic Profile ([facility])   Result Value Ref Range    Sodium 139 136 - 145 mmol/L    Potassium 3.8 3.5 - 5.0 mmol/L    Chloride 105 98 - 107 mmol/L    CO2, Total 25 22 - 31 mmol/L    Anion Gap 9 5 - 18 mmol/L    Glucose 97 70 - 125 mg/dL    Calcium 9.8 8.5 - 10.5 mg/dL    Urea Nitrogen 16 8 - 22 mg/dL    Creatinine 0.86 0.60 - 1.10 mg/dL    GFR Estimate If Black >60 >60 mL/min/1.73m2    GFR Estimate >60 >60 mL/min/1.73m2    Narrative    Test performed by:  [facility] [address] [place] [address]  Fasting Glucose reference range is 70-99 mg/dL per  American Diabetes Association (ADA) guidelines.       If you have any questions, please call the clinic to make an appointment.    Sincerely,    Ari Cruz MD

## 2019-03-22 ASSESSMENT — ANXIETY QUESTIONNAIRES: GAD7 TOTAL SCORE: 13

## 2019-03-22 NOTE — PROGRESS NOTES
"Michelle Shetty comes in today for the following concerns:    She has heart failure with moderately reduced ejection fraction and she is on beta-blockers and ACE inhibitors for this.  She takes Lasix as needed for symptom management.  She had an echocardiogram a little over a year ago.  She had a stress test about 3 years ago.  She is needing follow-up on this.    The patient is having some degree of chest discomfort and dyspnea with moderate exertion.  This is new in the last several months.  She is not having nighttime awakening from pain.  She is not having orthopnea or paroxysmal nocturnal dyspnea.  She is not having worsening ankle swelling.  She states her weight has been stable.    Patient is atrial fibrillation which is rate controlled.  She is on Xarelto for this.  She has not been having any blood in the stool, blood in the urine, hemoptysis or easy bruising.    The patient needs paperwork filled out for an upcoming dental procedure.  She needs clearance for this from a medical standpoint.      Her living situation is that she is at home with her father who is in very poor health.  He has had a previous CVA and requires constant cares.  He wears depends and she has to change this several times a day.  She is up almost all the time that he is awake taking care of him.  This is very stressful for her.    Allergies, medications and problem list reviewed and updated as needed in Epic.      REVIEW OF SYSTEMS    General: No fevers  Neck: No swallowing problems   CV: She does report palpitations that are occurring and anytime not run by exertion or relieved by rest.    Resp: See HPI.  GI: No constipation, or diarrhea.  No nausea or vomiting.  : No urinary c/o    /84   Pulse 92   Temp 97.8  F (36.6  C) (Oral)   Resp 24   Ht 1.784 m (5' 10.25\")   Wt 122.7 kg (270 lb 6.4 oz)   SpO2 99%   Breastfeeding? No   BMI 38.52 kg/m        Gen:  Well nourished and in NAD  HEENT: PERRLA; TMs normal color and " landmarks; nasopharynx pink and moist; oropharynx pink and moist  Neck: supple without lymphadenopathy  CV: Irregularly irregular with a rate in the 80s  Pulm:  CTAB, no wheezes/rales/rhonchi, good air entry   ABD: Obese, soft, nontender, no masses, no rebound, BS intact throughout  Extrem: no cyanosis, edema or clubbing  Skin: She has some mild fungal infection overlying an old appendectomy scar where she has puckering of the skin and a prominent skin fold.  Psych: Euthymic     ASSESSMENT and PLAN:  We need to follow-up on her heart failure with another echocardiogram.  We will also get a Lexiscan given her atypical chest pain and marked symptoms.  Finally given her palpitations and known A. fib we should proceed with a Holter monitor.  1. Chronic systolic heart failure (H)      2. Atrial flutter, unspecified type (H)    - Holter Monitor 48 hour Adult Pediatric; Future  - Magnesium ([facility])  - Thyroid [place] ([facility])  - Basic Metabolic Profile ([facility])    3. Palpitations    - Nuclear Med with Lexiscan (Stress Test); Future  - Magnesium ([facility])  - Thyroid [place] ([facility])  - Basic Metabolic Profile ([facility])    4. Tinea corporis    - terbinafine (LAMISIL) 1 % external cream; Apply topically 2 times daily  Dispense: 42 g; Refill: 1    5. Chronic systolic congestive heart failure (H)    - lisinopril (PRINIVIL/ZESTRIL) 5 MG tablet; Take 1 tablet (5 mg) by mouth daily  Dispense: 90 tablet; Refill: 3  - metoprolol succinate ER (TOPROL-XL) 100 MG 24 hr tablet; Take 1 tablet (100 mg) by mouth daily Take daily.  May take another dose after 4 hours if heart rate remains greater than 120 once daily  Dispense: 90 tablet; Refill: 3  - furosemide (LASIX) 40 MG tablet; Take 1 tablet (40 mg) by mouth daily as needed  Dispense: 90 tablet; Refill: 3    6. Chronic atrial fibrillation (H)    - rivaroxaban ANTICOAGULANT (XARELTO) 20 MG TABS tablet; Take 1 tablet (20 mg) by mouth daily (with dinner)  Dispense:  90 tablet; Refill: 3    7. Preventative health care    - PCS Use: Screening Digital Bilateral Mammogram; Future  - Fecal Occult Blood - FIT, iFOB (P ); Future        Total of 45 minutes was spent in face to face contact with patient with > 50% in counseling and coordination of care.  Options for treatment and/or follow-up care were reviewed with the patient/family who was engaged and actively involved in the decision making process and who verbalized understanding of the options discussed and was satisfied with the final plan.    RTC in 1 month for follow up or sooner if develops new or worsening symptoms.    Ari Cruz

## 2020-03-03 DIAGNOSIS — I50.22 CHRONIC SYSTOLIC CONGESTIVE HEART FAILURE (H): ICD-10-CM

## 2020-03-03 DIAGNOSIS — I48.20 CHRONIC ATRIAL FIBRILLATION (H): ICD-10-CM

## 2020-03-03 RX ORDER — RIVAROXABAN 20 MG/1
TABLET, FILM COATED ORAL
Qty: 90 TABLET | Refills: 3 | Status: SHIPPED | OUTPATIENT
Start: 2020-03-03 | End: 2020-04-08

## 2020-03-03 RX ORDER — LISINOPRIL 5 MG/1
TABLET ORAL
Qty: 90 TABLET | Refills: 3 | Status: SHIPPED | OUTPATIENT
Start: 2020-03-03 | End: 2020-04-02

## 2020-04-02 ENCOUNTER — TELEPHONE (OUTPATIENT)
Dept: FAMILY MEDICINE | Facility: CLINIC | Age: 60
End: 2020-04-02

## 2020-04-02 DIAGNOSIS — I50.22 CHRONIC SYSTOLIC CONGESTIVE HEART FAILURE (H): ICD-10-CM

## 2020-04-02 RX ORDER — FUROSEMIDE 40 MG
40 TABLET ORAL DAILY PRN
Qty: 90 TABLET | Refills: 3 | Status: SHIPPED | OUTPATIENT
Start: 2020-04-02 | End: 2020-04-08

## 2020-04-02 RX ORDER — LISINOPRIL 5 MG/1
5 TABLET ORAL DAILY
Qty: 90 TABLET | Refills: 3 | Status: SHIPPED | OUTPATIENT
Start: 2020-04-02 | End: 2020-04-08

## 2020-04-02 RX ORDER — METOPROLOL SUCCINATE 100 MG/1
TABLET, EXTENDED RELEASE ORAL
Qty: 90 TABLET | Refills: 3 | Status: SHIPPED | OUTPATIENT
Start: 2020-04-02 | End: 2020-04-08

## 2020-04-02 NOTE — TELEPHONE ENCOUNTER
Called patient, sent her a link to sign up for My Chart via text message and also made am appointment. CONNOR Gambino

## 2020-04-02 NOTE — TELEPHONE ENCOUNTER
Reached out to patient during COVID19 Clinic outreach. Reassured patient that [facility] is still open and has started implementing phone and video appointments to help patient remain safe at home.     Patient reports the following concerns: Needs a refill.    Per patient request, patient is scheduled for a visit to address their concerns on the following date: ASAP     Offered MyChart. Patient accepted.    Note will be routed to [facility] to assist in addressing patient concerns and/or to schedule a visit.     Ari Cruz MD

## 2020-04-02 NOTE — TELEPHONE ENCOUNTER
----- Message from Ari Cruz MD sent at 4/2/2020 11:19 AM CDT -----  This patient agreed to a virtual visit and agreed to MyChart.    Can you set up a phone/ virtual visit with me?    Thanks!    Ari

## 2020-04-08 ENCOUNTER — VIRTUAL VISIT (OUTPATIENT)
Dept: FAMILY MEDICINE | Facility: CLINIC | Age: 60
End: 2020-04-08
Payer: COMMERCIAL

## 2020-04-08 VITALS — HEIGHT: 70 IN | BODY MASS INDEX: 38.8 KG/M2

## 2020-04-08 DIAGNOSIS — I48.92 ATRIAL FLUTTER, UNSPECIFIED TYPE (H): ICD-10-CM

## 2020-04-08 DIAGNOSIS — I50.22 CHRONIC SYSTOLIC HEART FAILURE (H): Primary | ICD-10-CM

## 2020-04-08 DIAGNOSIS — I48.20 CHRONIC ATRIAL FIBRILLATION (H): ICD-10-CM

## 2020-04-08 DIAGNOSIS — I50.22 CHRONIC SYSTOLIC CONGESTIVE HEART FAILURE (H): ICD-10-CM

## 2020-04-08 RX ORDER — METOPROLOL SUCCINATE 100 MG/1
TABLET, EXTENDED RELEASE ORAL
Qty: 90 TABLET | Refills: 3 | Status: SHIPPED | OUTPATIENT
Start: 2020-04-08 | End: 2020-09-29

## 2020-04-08 RX ORDER — FUROSEMIDE 40 MG
40 TABLET ORAL DAILY PRN
Qty: 90 TABLET | Refills: 3 | Status: SHIPPED | OUTPATIENT
Start: 2020-04-08 | End: 2022-01-05

## 2020-04-08 RX ORDER — LISINOPRIL 5 MG/1
5 TABLET ORAL DAILY
Qty: 90 TABLET | Refills: 3 | Status: SHIPPED | OUTPATIENT
Start: 2020-04-08 | End: 2021-04-27

## 2020-04-08 NOTE — PROGRESS NOTES
"Family Medicine Telephone Visit Note         Telephone Visit Consent   Patient was verbally read the following and verbal consent was obtained.  \"I understand that I may revoke this request for a phone visit at any time.  This consent will automatically  3 months from the signed date and time.\"    Name person giving consent:  Patient   Date verbal consent given:  2020  Time verbal consent given:  2:48 PM      Chief Complaint   Patient presents with     Medication check              HPI   Patients name: Michelle  Appointment start time:  2:59 PM    Follow up with heart failure (HFpEF) and A Fib (permanent A Fib).    She takes the furosemide perhaps 4 pills a month.  Adherence and Exercise  Medication side effects: None  How often is a medication missed? Less than 1 time per week  Exercise:walking  2-3 days/week for an average of less than 15 minutes       Current Outpatient Medications   Medication Sig Dispense Refill     furosemide (LASIX) 40 MG tablet Take 1 tablet (40 mg) by mouth daily as needed 90 tablet 3     lisinopril (ZESTRIL) 5 MG tablet Take 1 tablet (5 mg) by mouth daily 90 tablet 3     metoprolol succinate ER (TOPROL-XL) 100 MG 24 hr tablet TAKE 1 TABLET(100 MG) BY MOUTH DAILY. MAY TAKE ANOTHER DOSE AFTER 4 HOURS IF HEART RATE REMAINS GREATER THAN 120 DAILY 90 tablet 3     XARELTO ANTICOAGULANT 20 MG TABS tablet TAKE 1 TABLET(20 MG) BY MOUTH DAILY WITH DINNER 90 tablet 3     Melatonin 10 MG TBCR Take 1 tablet by mouth nightly as needed       Multiple Vitamins-Minerals (HAIR SKIN AND NAILS FORMULA PO) Take 3 tablets by mouth daily       order for DME Equipment being ordered: pulse oximeter 1 Device 0     terbinafine (LAMISIL) 1 % external cream Apply topically 2 times daily 42 g 1     Allergies   Allergen Reactions     Amoxicillin Anaphylaxis     Codeine               Review of Systems:     ROS COMP: Constitutional, HEENT, cardiovascular, pulmonary, gi and gu systems are negative, except as " "otherwise noted.         Physical Exam:     Ht 1.778 m (5' 10\")   BMI 38.80 kg/m    Estimated body mass index is 38.8 kg/m  as calculated from the following:    Height as of this encounter: 1.778 m (5' 10\").    Weight as of 3/21/19: 122.7 kg (270 lb 6.4 oz).    Exam:   Constitutional: healthy, alert and no distress  Psychiatric: mentation appears normal and affect normal/bright    Diagnostic Test Results:  Labs reviewed in Epic        Assessment and Plan     1. Chronic systolic heart failure (H)      2. Atrial flutter, unspecified type (H)      3. Chronic systolic congestive heart failure (H)    - lisinopril (ZESTRIL) 5 MG tablet; Take 1 tablet (5 mg) by mouth daily  Dispense: 90 tablet; Refill: 3  - metoprolol succinate ER (TOPROL-XL) 100 MG 24 hr tablet; TAKE 1 TABLET(100 MG) BY MOUTH DAILY. MAY TAKE ANOTHER DOSE AFTER 4 HOURS IF HEART RATE REMAINS GREATER THAN 120 DAILY  Dispense: 90 tablet; Refill: 3  - furosemide (LASIX) 40 MG tablet; Take 1 tablet (40 mg) by mouth daily as needed  Dispense: 90 tablet; Refill: 3    4. Chronic atrial fibrillation    - rivaroxaban ANTICOAGULANT (XARELTO ANTICOAGULANT) 20 MG TABS tablet; TAKE 1 TABLET(20 MG) BY MOUTH DAILY WITH DINNER  Dispense: 90 tablet; Refill: 3    I offered follow-up lab testing as it is been over a year but the patient does not want to come into a clinic to get her blood drawn.  She says she will do this in June or July after her \"over the hump\" (of COVID-19).    Refilled medications that would be required in the next 3 months.     After Visit Information:  Patient chose to view AVS via [employer]    No follow-ups on file.    Appointment end time: 3:17 PM  This is a telephone visit that took 18 minutes.      Clinician location:  Christopher Cruz MD    "

## 2020-09-15 ENCOUNTER — TELEPHONE (OUTPATIENT)
Dept: FAMILY MEDICINE | Facility: CLINIC | Age: 60
End: 2020-09-15

## 2020-09-15 NOTE — TELEPHONE ENCOUNTER
"Patient reports she has had two episodes of vaginal bleeding in the last 4 days. Patient has not menstruated for 13 years, and on Saturday woke up \"in a crime scene\" puddle of blood. This bleeding stopped spontaneously until this morning, when it began again. She notes the bleeding is now less than it was Saturday. She is not wearing pads so is not sure how often she would have to change them. On Saturday she had a small amount of bleeding in the nose but this is \"not unusual\" for her. She denies dizziness, pain including abdominal pain, fatigue, shortness of breath. No hematuria, blood in stools, or bleeding from gums.    Of note, patient is on Xarelto for A Fib. She has not had bleeding episodes before. Patient reports her mother had a history of cervical cancer.     Patient states she is very worried about coming in to clinic because of COVID-19. She does not want to come to the clinic only to be sent to the hospital and be exposed to two places. Discussed with patient that although I thought we may be able to assess her bleeding outpatient with a same day blood draw and prompt ultra sound, she was correct that a clinic visit may end up with her being asked to go to the ER. Given patient is on Xarelto with new onset vaginal bleeding, discussed that if she wanted to head straight to the ER this is an appropriate indication.     She stated she will likely go to [place]'s ER. She will call back if she does not for a same day appointment. Routed to Dr. Cruz for FYI. ./LR      "

## 2020-09-15 NOTE — TELEPHONE ENCOUNTER
[facility] Family Medicine phone call message-patient reporting a symptom:     Symptom: Bleeding        Additional comments: Michelle called in saying that she has not had a period in 13 years and on Saturday woke up in a puddle of blood and again today is bleeding.      Primary language: English      needed? No    Call taken on September 15, 2020 at 10:39 AM by Sherry Anguiano

## 2020-09-16 NOTE — TELEPHONE ENCOUNTER
Patient reports she did not go to the hospital after our conversation yesterday. The bleeding has stopped and she feels well. She is agreeable to coming in for an appointment tomorrow for assessment. Scheduled with Dr. Mendez. Routed to Dr. Mendez, Dr. Cruz. ./ELIANA

## 2020-09-17 ENCOUNTER — OFFICE VISIT (OUTPATIENT)
Dept: FAMILY MEDICINE | Facility: CLINIC | Age: 60
End: 2020-09-17
Payer: COMMERCIAL

## 2020-09-17 VITALS
DIASTOLIC BLOOD PRESSURE: 89 MMHG | WEIGHT: 263 LBS | RESPIRATION RATE: 20 BRPM | SYSTOLIC BLOOD PRESSURE: 133 MMHG | OXYGEN SATURATION: 99 % | TEMPERATURE: 98.1 F | BODY MASS INDEX: 37.74 KG/M2 | HEART RATE: 63 BPM

## 2020-09-17 DIAGNOSIS — N93.9 VAGINAL BLEEDING: Primary | ICD-10-CM

## 2020-09-17 DIAGNOSIS — I48.20 CHRONIC ATRIAL FIBRILLATION (H): Chronic | ICD-10-CM

## 2020-09-17 DIAGNOSIS — N95.1 MENOPAUSAL STATE: ICD-10-CM

## 2020-09-17 LAB — HEMOGLOBIN: 12.3 G/DL (ref 11.7–15.7)

## 2020-09-17 ASSESSMENT — ENCOUNTER SYMPTOMS
CHILLS: 0
CHEST TIGHTNESS: 0
COUGH: 0
MUSCULOSKELETAL NEGATIVE: 1
SHORTNESS OF BREATH: 1
NAUSEA: 1
FEVER: 0
BRUISES/BLEEDS EASILY: 1
DIARRHEA: 0
PALPITATIONS: 1
UNEXPECTED WEIGHT CHANGE: 0
RHINORRHEA: 0
NUMBNESS: 1
SINUS PRESSURE: 0
VOMITING: 0

## 2020-09-17 NOTE — PROGRESS NOTES
Preceptor Attestation:    Patient seen and evaluated in person. I discussed the patient with the resident. I have verified the content of the note, which accurately reflects my assessment of the patient and the plan of care.   Supervising Physician:  Wilbert Perez MD.

## 2020-09-17 NOTE — PROGRESS NOTES
[facility]         SUBJECTIVE       Michelle Shetty is a 60 year old  female with a medical history significant for has Chronic systolic heart failure (H) and Atrial flutter, unspecified type (H) on their problem list. who presents to clinic today for:   Chief Complaint   Patient presents with     Abnormal Bleeding Problem     last saturday, wake up then see bleeding, no period      Vaginal Bleeding /Dysmenorrhea  Onset: Pt called clinic on 9/15/20 with complaint of vaginal bleeding starting .  It was thick, no clots, like a  Heavy period.  Reports that the bleeding did improve. Pt is menopausal for 13 years.  Also endorses small about of nosebleed, which is chronic for her.  Pt is taking Xarelto for atrial fibrillation.  Mother  5 yrs ago from ovarian cancer, which she had probably starting at age 40.   Has h/o fibroids 25 yrs ago, and this ws surgically removed.  Has not had any follow-up.    Description:   Duration of bleeding episodes: once on , and once on 9/15. None since.  Describe bleeding/flow:   Clots: no  Number of pads/hour: pads were not soaked  Cramping: none    Accompanying Signs & Symptoms:  Weakness: Yes Details: takes care of 86yo father. Feeling stressed.    Lightheadedness: Yes Details: vague description. Mostly on waking. Sometimes during the day.  Hot flashes: Yes Details: always sweaty  Nosebleeds/Easy bruising: Yes Details: Easy bruising more recently.  Has some dried blood in nostrils  On . Used to have easy bruising when first started xarelto 3 to 4 yrs ago.  Has a hematoma on Left foot.  Vaginal Discharge: no    History:  No LMP recorded. Patient is postmenopausal.  Any bleeding after intercourse: no  Abnormal PAP Smears: no  Any previous pelvic imaging? Yes Details: 20 years ago for fibroids    Anxiety:  Pt is quite anxious about her health in general and has many many questions about prognoses of her chronic medical conditions.  Not  interested in counseling services.    English-speaking patient so  was not used.  PMH, FH, Medications and Allergies were reviewed and updated as needed.    Family History     Problem (# of Occurrences) Relation (Name,Age of Onset)    Arrhythmia (2) Father, Brother    Breast Cancer (1) Other (Maternal Aunts x 3)    Cerebrovascular Disease (1) Father (65): multiple strokes    Diabetes (1) Mother    Hypertension (1) Mother    Myocardial Infarction (1) Father (60)    Ovarian Cancer (1) Mother (40)         REVIEW OF SYSTEMS     Review of Systems   Constitutional: Negative for chills, fever and unexpected weight change.        When wt increases, takes water pill.  No change in wt recently.   HENT: Negative for congestion, rhinorrhea and sinus pressure.    Eyes: Negative for visual disturbance.   Respiratory: Positive for shortness of breath. Negative for cough and chest tightness.         SOB when doesn't take water pill   Cardiovascular: Positive for palpitations. Negative for chest pain.        Occasional heart racing.     Gastrointestinal: Positive for nausea. Negative for diarrhea and vomiting.        Morning nausea x 6 months. Denies bloating.   Genitourinary: Positive for vaginal bleeding.        Menopausal. Urine brownish once on 9/12   Musculoskeletal: Negative.    Skin: Negative.    Neurological: Positive for numbness.        Occassional numbness and tingling on LUE   Hematological: Bruises/bleeds easily.         OBJECTIVE     Blood pressure 133/89, pulse 63, temperature 98.1  F (36.7  C), temperature source Oral, resp. rate 20, weight 119.3 kg (263 lb), SpO2 99 %, not currently breastfeeding.   Body mass index is 37.74 kg/m .    Physical Exam  Constitutional:       General: She is not in acute distress.     Appearance: She is normal weight. She is not toxic-appearing.   HENT:      Head: Normocephalic and atraumatic.   Eyes:      Extraocular Movements: Extraocular movements intact.       Conjunctiva/sclera: Conjunctivae normal.      Pupils: Pupils are equal, round, and reactive to light.   Neck:      Musculoskeletal: Normal range of motion and neck supple. No neck rigidity or muscular tenderness.   Cardiovascular:      Rate and Rhythm: Normal rate. Rhythm irregular.      Pulses: Normal pulses.      Heart sounds: Normal heart sounds. No murmur.   Pulmonary:      Effort: Pulmonary effort is normal. No respiratory distress.      Breath sounds: Normal breath sounds. No stridor. No wheezing or rhonchi.   Abdominal:      General: Bowel sounds are normal. There is no distension.      Palpations: Abdomen is soft. There is no mass.      Tenderness: There is no abdominal tenderness.   Musculoskeletal: Normal range of motion.      Right lower leg: No edema.      Left lower leg: No edema.   Skin:     General: Skin is warm and dry.      Coloration: Skin is not jaundiced or pale.      Findings: Bruising present.      Comments: Small hematoma on top of left foot   Neurological:      General: No focal deficit present.      Mental Status: She is alert and oriented to person, place, and time.   Psychiatric:         Mood and Affect: Mood is anxious.         Speech: Speech normal.         Behavior: Behavior normal.       Results for orders placed or performed in visit on 09/17/20   Hemoglobin (HGB) ([facility])     Status: None   Result Value Ref Range    Hemoglobin 12.3 11.7 - 15.7 g/dL     ASSESSMENT AND PLAN     Michelle was seen today for abnormal bleeding problem.    Vaginal bleeding:   Pt with 2 discrete occurrences of vaginal bleeding on 9/12 and 9/15 with about  1 oz blood.  Bleed has since stopped. Pt has family h/o ovarian cancer in mother at age 40; personal history of uterine fibroids 20 years ago; and menopause since age 47.  Pt is taking xarelto for atrial fibrillation.  Has h/o easy bruising and epistaxis as well.  Hemoglobin (HGB) 12.3 today, reasuring.  Given pt's presentation, age, personal hx, and  family history differential is broad and includes endometrial carcinoma, atypical hyperplasia, fibroid recurrence, uterine hyperplasia or uterine atrophy.  Could also be normal tissue, but side effect of anticoagulation.  Pt will likely require endometrial biopsy.  -     US Pelvic Complete with Transvaginal-- scheduled for 9/20/20; instructions provided and reviewed  -     OB/GYN REFERRAL -- coordinator will send US results with referral next week  -     Follow-up with myself or PCP in 3 weeks/ after OB/gyn consultation    atrial fibrillation, rate controlled, on anticoagulation:  Pt with irregular rhythm on exam.  Complains of occasional anxiety and feeling like her heart skipped a beat.  FH of arrhythmias and stroke.  Pt taking xarelto for AC and reports easy bruising.  [id number] score is 2.  Currently, pt has no acute bleed.  Hgb stable at 12.3.            - continue anticoagulation with xarelto         -  Call clinic if bleeding resumes or worsens    Menopausal state: Last normal period at age 47    Illness anxiety:  Pt is quite anxious today.  However, she states that she is not interested in mental health assitance right now.    Follow-up: Return in about 3 weeks (around 10/8/2020) for after OB/gyn visit.    The patient was seen by, and discussed with, Dr. Wilbert Perez who agrees with the plan.  -----  Margo Mendez MD  PGY-2  [facility]  Office: [phone]  Pager: [phone]

## 2020-09-17 NOTE — PROGRESS NOTES
Pt called with vaginal bleeding.    Ordered Hgb lab to be done at beginning of visit.    Margo Mendez MD

## 2020-09-17 NOTE — PATIENT INSTRUCTIONS
Patient Education     Discharge Instructions for Atrial Fibrillation  You have been diagnosed with an abnormal heart rhythm called atrial fibrillation. With this condition, your heart s 2 upper chambers quiver rather than squeeze the blood out in a normal pattern. This leads to an irregular and sometimes rapid heartbeat. Some people will develop associated symptoms such as a flip-flopping heartbeat, chest pain, lightheadedness, or shortness of breath. Other people may have no symptoms at all. Atrial fibrillation is serious because it affects the heart s ability to fill with blood as it should. Blood clots may form. This increases the risk for stroke. Untreated atrial fibrillation can also lead to heart failure. Atrial fibrillation can be controlled. With treatment, most people with atrial fibrillation lead normal lives.  Treatment options  Recommended treatment for atrial fibrillation depends on your age, symptoms, how long you have had atrial fibrillation, and other factors. You will have a complete evaluation to find out if you have any abnormalities that caused your heart to go into atrial fibrillation. This might be blocked heart arteries or a thyroid problem. Your doctor will assess your particular case and discuss choices with you.  Treatment choices may include:    Treating an underlying disorder that puts you at risk for atrial fibrillation. For example, correcting an abnormal thyroid or electrolyte problem, or treating a blocked heart artery.    Restoring a normal heart rhythm with an electrical shock (cardioversion) or with an antiarrhythmic medicine (chemical cardioversion).    Using medicine to control your heart rate in atrial fibrillation.    Preventing the risk for blood clot and stroke using blood-thinning medicines. Your doctor will tell you what he or she recommends. Choices may include aspirin, clopidogrel, warfarin, dabigatran, rivaroxaban, apixaban, and edoxaban.    Doing catheter ablation or  a surgical maze procedure. These use different methods to destroy certain areas of heart tissue. This interrupts the electrical signals causing atrial fibrillation. One of these procedures may be a choice when medicines do not work, or as an alternative to long-term medicine.    Other treatment choices may be recommended for you by your doctor.  Managing risk factors for stroke and preventing heart failure are important parts of any treatment plan for atrial fibrillation.  Home care    Take your medicines exactly as directed. Don t skip doses.    Work with your doctor to find the right medicines and doses for you.    Learn to take your own pulse. Keep a record of your results. Ask your doctor which pulse rates mean that you need medical attention. Slowing your pulse is often the goal of treatment. Ask your doctor if it s OK for you to use an automatic machine to check your pulse at home. Sometimes these machines don t count the pulse correctly when you have atrial fibrillation.    Limit your intake of coffee, tea, cola, and other beverages with caffeine. Talk with your doctor about whether you should eliminate caffeine.    Avoid over-the-counter medicines that have caffeine in them.    Let your doctor know what medicines you take, including prescription and over-the-counter medicines, as well as any supplements. They interfere with some medicines given for atrial fibrillation.    Ask your doctor about whether you can drink alcohol. Some people need to avoid alcohol to better treat atrial fibrillation. If you are taking blood-thinner medicines, alcohol may interfere with them by increasing their effect.    Never take stimulants such as amphetamines or cocaine. These drugs can speed up your heart rate and trigger atrial fibrillation.  Follow-up care  Follow up with your doctor, or as advised.     When should I call my healthcare provider  Call your healthcare provider right away if you have any of the  following:    Weakness    Dizziness    Fainting    Fatigue    Shortness of breath    Chest pain with increased activity    A change in the usual regularity of your heartbeat, or an unusually fast heartbeat  Date Last Reviewed: 4/23/2016 2000-2019 [employer]. [address] [place] [address], [place] [address]. All rights reserved. This information is not intended as a substitute for professional medical care. Always follow your healthcare professional's instructions.           Patient Education     Pelvic Ultrasound  Pelvic ultrasound is an imaging test that uses sound waves to form pictures of your organs. It can help assess pain or other symptoms within your pelvis, the area between your hip bones. In women who are not pregnant, it is typically done to check the uterus and ovaries. In pregnant women, it is used to check the health of the unborn baby. There are no known risks of diagnostic ultrasound.      How do I get ready for an ultrasound?    You may be told to drink several glasses of water or other clear fluid starting 1 to 2 hours before your test. Ask your healthcare provider for specific instructions.    The test may take 30 to 45 minutes. Allow extra time to check in.     The healthcare professional doing the ultrasound may ask why your healthcare provider has ordered the test. He or she may also ask when your last period started. Also let him or her know:    If you ve had an ultrasound exam of this area before    If you ve had any pelvic surgery    What medicines you take    If you re pregnant  What happens during the ultrasound?     You will lie on your back with your abdomen exposed.    A nongreasy gel will be applied to the skin.    The sonographer will move a handheld probe (transducer) across your pelvis.  The test may include a second part. You can empty your bladder before this part of the test.    You will lie on your back with your knees raised.    A probe covered with nongreasy gel is  placed inside your vagina. Or you may be asked to insert the probe yourself as you would a tampon. The probe should not be painful.  Your healthcare provider will explain the results.  What happens after an ultrasound?     Your healthcare provider will discuss the test results with you during a follow-up visit or over the phone.    Your next appointment is: ___Monday at 1:30PM  Date Last Reviewed: 6/1/2017 2000-2019 [employer]. [address] [place] [address]. All rights reserved. This information is not intended as a substitute for professional medical care. Always follow your healthcare professional's instructions.         09/17/20   PELVIS   Appointment:Monday September 21st   Time: 1:40pm  Provider: Latosha Diaz  Location: [facility]    PREP INSTRUCTIONS: printed/given to patient    OB/GYN REFERRAL  [facility]  Phone: [phone]  Fax: [phone]    Referral, demographics, labs and office note faxed to [phone]. They will contact patient to schedule.     Cintia Moore

## 2020-09-21 DIAGNOSIS — N93.9 VAGINAL BLEEDING: Primary | ICD-10-CM

## 2020-09-21 DIAGNOSIS — N93.9 VAGINAL BLEEDING: ICD-10-CM

## 2020-09-24 NOTE — RESULT ENCOUNTER NOTE
I called patient with results. Transvaginal ultrasound shows multiple uterine fibrioids - largest of 3 described is 4.8 x 4.5 x 4.1 cm.  Endometrial thickness is 11 mm.  Given postmenopausal bleeding, endometrial biopsy is recommended.   Pt is waiting to hear back on scheduling of appt with gynecologists. Will call on Monday if no appt scheduled yet.    Margo Mendez MD

## 2020-09-29 DIAGNOSIS — I50.22 CHRONIC SYSTOLIC CONGESTIVE HEART FAILURE (H): ICD-10-CM

## 2020-09-29 RX ORDER — METOPROLOL SUCCINATE 100 MG/1
TABLET, EXTENDED RELEASE ORAL
Qty: 90 TABLET | Refills: 3 | Status: SHIPPED | OUTPATIENT
Start: 2020-09-29 | End: 2021-09-28

## 2020-09-30 ENCOUNTER — TELEPHONE (OUTPATIENT)
Dept: FAMILY MEDICINE | Facility: CLINIC | Age: 60
End: 2020-09-30

## 2020-09-30 NOTE — TELEPHONE ENCOUNTER
[facility] phone call message - order or referral request for patient:     Order or referral being requested:   Pt is calling asked about the biopsy that was recommended after her US that her and Dr. Mendez talked about.   Its doesn't appear there is any order placed for this. I will route a message to Dr. Mendez to clarify.       OK to leave a message on voice mail? Yes    Primary language: English      needed? No    Call taken on September 30, 2020 at 4:37 PM by Cintia Moore

## 2020-10-05 NOTE — TELEPHONE ENCOUNTER
Call pt to discuss upcoming EMB procedure.  Notified pt of need to hold xarelto night before procedure.  Will also hold on day of procedure as well.      Discussed with Dr. Tran.    Margo Mendez MD

## 2020-10-08 ENCOUNTER — OFFICE VISIT (OUTPATIENT)
Dept: FAMILY MEDICINE | Facility: CLINIC | Age: 60
End: 2020-10-08
Payer: COMMERCIAL

## 2020-10-08 VITALS
OXYGEN SATURATION: 97 % | WEIGHT: 260.4 LBS | BODY MASS INDEX: 37.36 KG/M2 | TEMPERATURE: 98.5 F | DIASTOLIC BLOOD PRESSURE: 86 MMHG | RESPIRATION RATE: 16 BRPM | SYSTOLIC BLOOD PRESSURE: 118 MMHG | HEART RATE: 74 BPM

## 2020-10-08 DIAGNOSIS — N95.0 POST-MENOPAUSAL BLEEDING: Primary | ICD-10-CM

## 2020-10-08 PROCEDURE — 99213 OFFICE O/P EST LOW 20 MIN: CPT | Mod: GC | Performed by: STUDENT IN AN ORGANIZED HEALTH CARE EDUCATION/TRAINING PROGRAM

## 2020-10-08 NOTE — PROGRESS NOTES
Preceptor Attestation:    Patient seen and evaluated in person. I discussed the patient with the resident. I was present for and supervised the entire procedure. I have verified the content of the note, which accurately reflects my assessment of the patient and the plan of care.   Supervising Physician:  Miguel Cole MD.

## 2020-10-09 ASSESSMENT — ENCOUNTER SYMPTOMS
SHORTNESS OF BREATH: 0
CHILLS: 0
DYSPHORIC MOOD: 0
FEVER: 0
HEADACHES: 0
LIGHT-HEADEDNESS: 0
DIZZINESS: 0
PALPITATIONS: 0
VOMITING: 0
COUGH: 0
CHEST TIGHTNESS: 0
DIFFICULTY URINATING: 0
DYSURIA: 0
NAUSEA: 0
ABDOMINAL PAIN: 0
SORE THROAT: 0

## 2020-10-10 NOTE — PROGRESS NOTES
[facility]         SUBJECTIVE       Michelle Shetty is a 60 year old  female with a medical history significant for uterine fibroids and postmenopausal bleeding who presents to clinic today for:   Chief Complaint   Patient presents with     RECHECK     endometrial biopsy/ no concern      Patient reports that vaginal bleeding has now stopped since last appointment.  She otherwise feels well and back to her usual self.  Anxious to have work-up completed and is worried given family history of ovarian cancer.     English-speaking patient so  was not used.         REVIEW OF SYSTEMS     Review of Systems   Constitutional: Negative for chills and fever.   HENT: Negative for sneezing and sore throat.    Eyes: Negative for visual disturbance.   Respiratory: Negative for cough, chest tightness and shortness of breath.    Cardiovascular: Negative for palpitations and leg swelling.   Gastrointestinal: Negative for abdominal pain, nausea and vomiting.   Genitourinary: Negative for decreased urine volume, difficulty urinating, dysuria, genital sores, pelvic pain, vaginal bleeding and vaginal discharge.   Neurological: Negative for dizziness, light-headedness and headaches.   Psychiatric/Behavioral: Negative for dysphoric mood.          OBJECTIVE     Blood pressure 118/86, pulse 74, temperature 98.5  F (36.9  C), temperature source Oral, resp. rate 16, weight 118.1 kg (260 lb 6.4 oz), SpO2 97 %, not currently breastfeeding.   Body mass index is 37.36 kg/m .    Physical Exam  Vitals signs reviewed. Exam conducted with a chaperone present.   Constitutional:       General: She is not in acute distress.     Appearance: She is normal weight. She is not toxic-appearing.   HENT:      Head: Normocephalic and atraumatic.   Eyes:      Extraocular Movements: Extraocular movements intact.      Conjunctiva/sclera: Conjunctivae normal.   Cardiovascular:      Rate and Rhythm: Normal rate and regular rhythm.       Pulses: Normal pulses.      Heart sounds: Normal heart sounds. No murmur.   Pulmonary:      Effort: Pulmonary effort is normal.      Breath sounds: Normal breath sounds.   Abdominal:      General: Bowel sounds are normal. There is no distension.      Palpations: Abdomen is soft. There is no mass.      Tenderness: There is no abdominal tenderness.   Genitourinary:     General: Normal vulva.      Exam position: Lithotomy position.      Pubic Area: No rash or pubic lice.       Labia:         Right: No rash or lesion.         Left: No rash or lesion.       Vagina: Normal. No vaginal discharge, erythema, tenderness or bleeding.      Comments: Cervix located 1 to 2 cm beyond reach of normal sized speculum.  Able to visualize cervical loss clearly.  Skin:     General: Skin is warm and dry.   Neurological:      Mental Status: She is alert.       Procedure attempted: Endometrial biopsy was not completed today    ASSESSMENT AND PLAN     1. Post-menopausal bleeding  Patient with postmenopausal bleeding and transvaginal ultrasound showing 3 uterine fibroids as well as thickened endometrium to 11 mm.  Patient reports vaginal bleeding has since stopped and she is feeling back to her usual self.  Asymptomatic and well-appearing on exam today.  Attempted endometrial biopsy today.  Unfortunately could not complete procedure due to deep location of cervix approximately 1 to 2 cm beyond reach of normal size speculum.  Cervical os could not be well visualized and therefore procedure was terminated.  -Follow-up with OB/GYN scheduled for 10/20/2020  -Patient will likely still need endometrial biopsy --recommend use of extra long speculums for future pelvic exams    Follow-up: In 1 month    The patient was seen by, and discussed with, Dr. Miguel Cole who agrees with the plan.  -----  Margo Mendez MD  PGY-2  [facility]  Office: [phone]  Pager: [phone]

## 2020-10-19 NOTE — PROGRESS NOTES
[facility]  Gynecology Visit    Reason for Visit: post-menopausal bleeding, EMB    HPI:    Michelle Shetty is a 60 year old  here for follow-up of post-menopausal bleeding and for EMB. She was seen by PCP on  and 10/8 for the same, had attempted EMB but provider was unable to complete procedure due to deep location of cervix.    Patient reports that she had bleeding heavy vaginal bleeding starting on 9/15, woke up in a puddle blood and then had several days of spotting after that. Now for the last week or so no bleeding. Denies any injury or trauma prior to this incident. LMP at age 47, no other hx of postmenopausal bleeding. Does have some intermittent hot flashes and increased irritability, but this has been improving over time.      Mother with hx of ovarian cancer, passed from this in . Also has personal hx of uterine fibroids, s/p myomectomy 25 yrs ago and with recurrent fibroids noted on US as well as thickened endometrium (see report below).     She notes that she has a hx of Afib, held her xarelto last night as she was told to do this at her last EMB attempt with her PCP. She also has a hx of heart failure and HTN which is managed by her primary care giver.     She has been having significant social stressors recently, predominantly surrounding the care of her father who is in his 80s. He had a stroke several years ago and she is his primary care giver. Frustrated by the lack of support from her brother. Took the PHQ9 and GAD7 today, but reports that she didn't really answer these honestly.     GYN History  No LMP recorded. Patient is postmenopausal. Menopause at age 47  Sexually active: yes, with male partner, her   History of STIs: none  Pap Smears: has not had one in many years,   History of abnormal paps: unsure of abnormal, no hx of colpo    OBHx    Delivered son at 27 wks, was told her PTL was related to her fibroids being so large.     Past Medical History:   Diagnosis  Date     Anemia      Atrial flutter (H)      Diastolic heart failure (H)        Past Surgical History:   Procedure Laterality Date     BREAST SURGERY      benign tumor left breast     CHOLECYSTECTOMY       H LAP ABLAT UTERINE FIBROIDS W/INTRAOP US GUIDE           Current Outpatient Medications:      furosemide (LASIX) 40 MG tablet, Take 1 tablet (40 mg) by mouth daily as needed, Disp: 90 tablet, Rfl: 3     lisinopril (ZESTRIL) 5 MG tablet, Take 1 tablet (5 mg) by mouth daily, Disp: 90 tablet, Rfl: 3     Melatonin 10 MG TBCR, Take 1 tablet by mouth nightly as needed, Disp: , Rfl:      metoprolol succinate ER (TOPROL-XL) 100 MG 24 hr tablet, TAKE 1 TABLET(100 MG) BY MOUTH DAILY. MAY TAKE ANOTHER DOSE AFTER 4 HOURS IF HEART RATE REMAINS GREATER THAN 120 DAILY, Disp: 90 tablet, Rfl: 3     Multiple Vitamins-Minerals (HAIR SKIN AND NAILS FORMULA PO), Take 3 tablets by mouth daily, Disp: , Rfl:      rivaroxaban ANTICOAGULANT (XARELTO ANTICOAGULANT) 20 MG TABS tablet, TAKE 1 TABLET(20 MG) BY MOUTH DAILY WITH DINNER, Disp: 90 tablet, Rfl: 3     order for DME, Equipment being ordered: pulse oximeter, Disp: 1 Device, Rfl: 0    Allergies   Allergen Reactions     Amoxicillin Anaphylaxis     Codeine        Family History   Problem Relation Age of Onset     Diabetes Mother      Hypertension Mother      Ovarian Cancer Mother 40     Arrhythmia Father      Cerebrovascular Disease Father 65        multiple strokes     Myocardial Infarction Father 60     Arrhythmia Brother      Breast Cancer Other      No Known Problems Maternal Grandmother      No Known Problems Maternal Grandfather      No Known Problems Paternal Grandmother      No Known Problems Paternal Grandfather      No Known Problems Sister      No Known Problems Son      No Known Problems Daughter      No Known Problems Maternal Half-Brother      No Known Problems Maternal Half-Sister      No Known Problems Paternal Half-Brother      No Known Problems Paternal Half-Sister   "    No Known Problems Niece      No Known Problems Nephew      No Known Problems Cousin        Social History     Socioeconomic History     Marital status: Single     Spouse name: Not on file     Number of children: Not on file     Years of education: Not on file     Highest education level: Not on file   Occupational History     Not on file   Social Needs     Financial resource strain: Not on file     Food insecurity     Worry: Not on file     Inability: Not on file     Transportation needs     Medical: Not on file     Non-medical: Not on file   Tobacco Use     Smoking status: Current Some Day Smoker     Packs/day: 0.50     Types: Cigarettes     Smokeless tobacco: Never Used   Substance and Sexual Activity     Alcohol use: Yes     Alcohol/week: 1.0 standard drinks     Types: 1 Glasses of wine per week     Comment: Daily     Drug use: No     Sexual activity: Yes     Partners: Male     Birth control/protection: None   Lifestyle     Physical activity     Days per week: Not on file     Minutes per session: Not on file     Stress: Not on file   Relationships     Social connections     Talks on phone: Not on file     Gets together: Not on file     Attends Orthodoxy service: Not on file     Active member of club or organization: Not on file     Attends meetings of clubs or organizations: Not on file     Relationship status: Not on file     Intimate partner violence     Fear of current or ex partner: Not on file     Emotionally abused: Not on file     Physically abused: Not on file     Forced sexual activity: Not on file   Other Topics Concern     Not on file   Social History Narrative     Not on file       ROS: 10-Point ROS negative except as noted in HPI    Physical Exam  /80   Pulse 70   Ht 1.803 m (5' 11\")   Wt 118.8 kg (262 lb)   Breastfeeding No   BMI 36.54 kg/m    Gen: Well-appearing, NAD, appears younger than stated age.  Abd: Soft, non-tender, non-distended  Ext: No LE edema, extremities warm and well " "perfused    Pelvic:  Normal appearing external female genitalia. Normal hair distribution. Vagina is without lesions.  discharge. Cervix parous, no lesions, no cervical motion tenderness. Uterus is small, mobile, non-tender. No adnexal tenderness or masses. Pap smear obtained and EMB obtained (see procedure note below).     Imaging:  Pelvic US 20  IMPRESSION:  1. Thickened uterine endometrium measuring 11 mm in thickness. Given the patient's history of postmenopausal bleeding, tissue sampling is recommended.   2. Multiple subserosal uterine fibroids. No definitive submucosal uterine fibroid.     Assessment/Plan:  Michelle Shetty is a 60 year old  female here for post-menopausal bleeding with fibroids and thickened endometrium noted on pelvic US. Bleeding has now resolved, no ongoing pain or other symptoms. EMB performed today (see procedure note below). Also with significant social stress recently, referral for mental health placed today.       # Postmenopausal bleeding  - Bleeding resolved, no pain   - EMB obtained today (see below), will call with results     # Routine health care  - Pap obtained today, will call with results    # Social stressors  # Elevated PHQ9  - Referral for mental health placed today, encouraged pt to consider caregiver support groups      Return to clinic prn results from EMB.    Patient seen and staffed with Dr. Cagle.     Karis Gonzalez MD  OBGYN PGY-1  4:33 PM 2020      I agree with note as above. The patient was seen in continuity clinic by the resident doctor and me.  Assessment and plan were jointly made.  Stella Cagle MD              Endometrial Biopsy    Menstrual History:  Postmenopausal since age 47.    Time Out - \"Pause for the Cause\"  Just before the procedure begins, through verbal and active participation of team members, verify:                      Initials   Patient Name RJ   Patient  RJ   Procedure to be performed RJ                        "                                                                                                               Indication: Postmenopausal bleeding  Faculty:  I was present with the resident throughout the entire procedure.    Consent: Risks, benefits of treatment, and no treatment were discussed.  Patient's questions were elicited and answered.  and Written consent signed and scanned into medical record.    Using a medium Graves speculum, the cervix was visualized. The cervix was prepped with Betadine.  A single tooth tenaculum was applied to the anterior lip of the cervix. The endometrial pipelle was advanced through the cervix after use of an os finder and a sample collected. Two additional passes was made.  The tenaculum was removed from the cervix and the tenaculum site made hemostatic with pressure.    EBL: <5 cc    Complications:  None apparent  Pathology: EMB sample was sent to pathology.  Tolerance of Procedure:  Patient did tolerate the procedure well.     Patient was instructed to call if she experiences any heavy bleeding, severe cramping, or abnormal vaginal discharge.  May take ibuprofen 400-800 mg PO TID PRN or naproxen 500 mg PO BID for cramping.    Will notify patient of results.    Karis Gonzalez MD  OBGYN PGY-1  4:36 PM October 20, 2020    I agree with note as above. The patient was seen in continuity clinic by the resident doctor and me.  Assessment and plan were jointly made.  Stella Cagle MD

## 2020-10-20 ENCOUNTER — OFFICE VISIT (OUTPATIENT)
Dept: OBGYN | Facility: CLINIC | Age: 60
End: 2020-10-20
Attending: FAMILY MEDICINE
Payer: COMMERCIAL

## 2020-10-20 VITALS
HEART RATE: 70 BPM | WEIGHT: 262 LBS | SYSTOLIC BLOOD PRESSURE: 122 MMHG | DIASTOLIC BLOOD PRESSURE: 80 MMHG | BODY MASS INDEX: 36.68 KG/M2 | HEIGHT: 71 IN

## 2020-10-20 DIAGNOSIS — N93.9 VAGINAL BLEEDING: ICD-10-CM

## 2020-10-20 DIAGNOSIS — N95.0 POSTMENOPAUSAL BLEEDING: Primary | ICD-10-CM

## 2020-10-20 DIAGNOSIS — F43.9 STRESS AT HOME: ICD-10-CM

## 2020-10-20 PROCEDURE — 99203 OFFICE O/P NEW LOW 30 MIN: CPT | Mod: 25 | Performed by: OBSTETRICS & GYNECOLOGY

## 2020-10-20 PROCEDURE — 58100 BIOPSY OF UTERUS LINING: CPT | Mod: GC | Performed by: OBSTETRICS & GYNECOLOGY

## 2020-10-20 PROCEDURE — G0463 HOSPITAL OUTPT CLINIC VISIT: HCPCS

## 2020-10-20 PROCEDURE — 58100 BIOPSY OF UTERUS LINING: CPT | Performed by: STUDENT IN AN ORGANIZED HEALTH CARE EDUCATION/TRAINING PROGRAM

## 2020-10-20 PROCEDURE — 88305 TISSUE EXAM BY PATHOLOGIST: CPT | Mod: 26 | Performed by: PATHOLOGY

## 2020-10-20 PROCEDURE — 88305 TISSUE EXAM BY PATHOLOGIST: CPT | Mod: TC | Performed by: OBSTETRICS & GYNECOLOGY

## 2020-10-20 PROCEDURE — 88175 CYTOPATH C/V AUTO FLUID REDO: CPT | Mod: TC | Performed by: OBSTETRICS & GYNECOLOGY

## 2020-10-20 PROCEDURE — 87624 HPV HI-RISK TYP POOLED RSLT: CPT | Performed by: OBSTETRICS & GYNECOLOGY

## 2020-10-20 ASSESSMENT — ANXIETY QUESTIONNAIRES
7. FEELING AFRAID AS IF SOMETHING AWFUL MIGHT HAPPEN: MORE THAN HALF THE DAYS
2. NOT BEING ABLE TO STOP OR CONTROL WORRYING: NOT AT ALL
GAD7 TOTAL SCORE: 9
5. BEING SO RESTLESS THAT IT IS HARD TO SIT STILL: NOT AT ALL
3. WORRYING TOO MUCH ABOUT DIFFERENT THINGS: MORE THAN HALF THE DAYS
1. FEELING NERVOUS, ANXIOUS, OR ON EDGE: NOT AT ALL
6. BECOMING EASILY ANNOYED OR IRRITABLE: NEARLY EVERY DAY

## 2020-10-20 ASSESSMENT — PATIENT HEALTH QUESTIONNAIRE - PHQ9
5. POOR APPETITE OR OVEREATING: MORE THAN HALF THE DAYS
SUM OF ALL RESPONSES TO PHQ QUESTIONS 1-9: 0

## 2020-10-20 ASSESSMENT — PAIN SCALES - GENERAL: PAINLEVEL: NO PAIN (0)

## 2020-10-20 ASSESSMENT — MIFFLIN-ST. JEOR: SCORE: 1854.55

## 2020-10-20 NOTE — NURSING NOTE
Chief Complaint   Patient presents with     Establish Care     C/O irregular bleeding   Janny Gonzalez LPN

## 2020-10-20 NOTE — LETTER
10/20/2020       RE: Michelle Shetty  [address]  [place] [address]     Dear Colleague,    Thank you for referring your patient, Michelle Shetty, to the [facility] [place] [facility]. Please see a copy of my visit note below.    [facility]  Gynecology Visit    Reason for Visit: post-menopausal bleeding, EMB    HPI:    Michelle Shetty is a 60 year old  here for follow-up of post-menopausal bleeding and for EMB. She was seen by PCP on  and 10/8 for the same, had attempted EMB but provider was unable to complete procedure due to deep location of cervix.    Patient reports that she had bleeding heavy vaginal bleeding starting on 9/15, woke up in a puddle blood and then had several days of spotting after that. Now for the last week or so no bleeding. Denies any injury or trauma prior to this incident. LMP at age 47, no other hx of postmenopausal bleeding. Does have some intermittent hot flashes and increased irritability, but this has been improving over time.      Mother with hx of ovarian cancer, passed from this in . Also has personal hx of uterine fibroids, s/p myomectomy 25 yrs ago and with recurrent fibroids noted on US as well as thickened endometrium (see report below).     She notes that she has a hx of Afib, held her xarelto last night as she was told to do this at her last EMB attempt with her PCP. She also has a hx of heart failure and HTN which is managed by her primary care giver.     She has been having significant social stressors recently, predominantly surrounding the care of her father who is in his 80s. He had a stroke several years ago and she is his primary care giver. Frustrated by the lack of support from her brother. Took the PHQ9 and GAD7 today, but reports that she didn't really answer these honestly.     GYN History  No LMP recorded. Patient is postmenopausal. Menopause at age 47  Sexually active:  yes, with male partner, her   History of STIs: none  Pap Smears: has not had one in many years,   History of abnormal paps: unsure of abnormal, no hx of colpo    OBHx    Delivered son at 27 wks, was told her PTL was related to her fibroids being so large.     Past Medical History:   Diagnosis Date     Anemia      Atrial flutter (H)      Diastolic heart failure (H)        Past Surgical History:   Procedure Laterality Date     BREAST SURGERY      benign tumor left breast     CHOLECYSTECTOMY       H LAP ABLAT UTERINE FIBROIDS W/INTRAOP US GUIDE           Current Outpatient Medications:      furosemide (LASIX) 40 MG tablet, Take 1 tablet (40 mg) by mouth daily as needed, Disp: 90 tablet, Rfl: 3     lisinopril (ZESTRIL) 5 MG tablet, Take 1 tablet (5 mg) by mouth daily, Disp: 90 tablet, Rfl: 3     Melatonin 10 MG TBCR, Take 1 tablet by mouth nightly as needed, Disp: , Rfl:      metoprolol succinate ER (TOPROL-XL) 100 MG 24 hr tablet, TAKE 1 TABLET(100 MG) BY MOUTH DAILY. MAY TAKE ANOTHER DOSE AFTER 4 HOURS IF HEART RATE REMAINS GREATER THAN 120 DAILY, Disp: 90 tablet, Rfl: 3     Multiple Vitamins-Minerals (HAIR SKIN AND NAILS FORMULA PO), Take 3 tablets by mouth daily, Disp: , Rfl:      rivaroxaban ANTICOAGULANT (XARELTO ANTICOAGULANT) 20 MG TABS tablet, TAKE 1 TABLET(20 MG) BY MOUTH DAILY WITH DINNER, Disp: 90 tablet, Rfl: 3     order for DME, Equipment being ordered: pulse oximeter, Disp: 1 Device, Rfl: 0    Allergies   Allergen Reactions     Amoxicillin Anaphylaxis     Codeine        Family History   Problem Relation Age of Onset     Diabetes Mother      Hypertension Mother      Ovarian Cancer Mother 40     Arrhythmia Father      Cerebrovascular Disease Father 65        multiple strokes     Myocardial Infarction Father 60     Arrhythmia Brother      Breast Cancer Other      No Known Problems Maternal Grandmother      No Known Problems Maternal Grandfather      No Known Problems Paternal Grandmother       No Known Problems Paternal Grandfather      No Known Problems Sister      No Known Problems Son      No Known Problems Daughter      No Known Problems Maternal Half-Brother      No Known Problems Maternal Half-Sister      No Known Problems Paternal Half-Brother      No Known Problems Paternal Half-Sister      No Known Problems Niece      No Known Problems Nephew      No Known Problems Cousin        Social History     Socioeconomic History     Marital status: Single     Spouse name: Not on file     Number of children: Not on file     Years of education: Not on file     Highest education level: Not on file   Occupational History     Not on file   Social Needs     Financial resource strain: Not on file     Food insecurity     Worry: Not on file     Inability: Not on file     Transportation needs     Medical: Not on file     Non-medical: Not on file   Tobacco Use     Smoking status: Current Some Day Smoker     Packs/day: 0.50     Types: Cigarettes     Smokeless tobacco: Never Used   Substance and Sexual Activity     Alcohol use: Yes     Alcohol/week: 1.0 standard drinks     Types: 1 Glasses of wine per week     Comment: Daily     Drug use: No     Sexual activity: Yes     Partners: Male     Birth control/protection: None   Lifestyle     Physical activity     Days per week: Not on file     Minutes per session: Not on file     Stress: Not on file   Relationships     Social connections     Talks on phone: Not on file     Gets together: Not on file     Attends [employer]an service: Not on file     Active member of club or organization: Not on file     Attends meetings of clubs or organizations: Not on file     Relationship status: Not on file     Intimate partner violence     Fear of current or ex partner: Not on file     Emotionally abused: Not on file     Physically abused: Not on file     Forced sexual activity: Not on file   Other Topics Concern     Not on file   Social History Narrative     Not on file       ROS: 10-Point  "ROS negative except as noted in HPI    Physical Exam  /80   Pulse 70   Ht 1.803 m (5' 11\")   Wt 118.8 kg (262 lb)   Breastfeeding No   BMI 36.54 kg/m    Gen: Well-appearing, NAD, appears younger than stated age.  Abd: Soft, non-tender, non-distended  Ext: No LE edema, extremities warm and well perfused    Pelvic:  Normal appearing external female genitalia. Normal hair distribution. Vagina is without lesions.  discharge. Cervix parous, no lesions, no cervical motion tenderness. Uterus is small, mobile, non-tender. No adnexal tenderness or masses. Pap smear obtained and EMB obtained (see procedure note below).     Imaging:  Pelvic US 20  IMPRESSION:  1. Thickened uterine endometrium measuring 11 mm in thickness. Given the patient's history of postmenopausal bleeding, tissue sampling is recommended.   2. Multiple subserosal uterine fibroids. No definitive submucosal uterine fibroid.     Assessment/Plan:  Michelle Shetty is a 60 year old  female here for post-menopausal bleeding with fibroids and thickened endometrium noted on pelvic US. Bleeding has now resolved, no ongoing pain or other symptoms. EMB performed today (see procedure note below). Also with significant social stress recently, referral for mental health placed today.       # Postmenopausal bleeding  - Bleeding resolved, no pain   - EMB obtained today (see below), will call with results     # Routine health care  - Pap obtained today, will call with results    # Social stressors  # Elevated PHQ9  - Referral for mental health placed today, encouraged pt to consider caregiver support groups      Return to clinic prn results from EMB.    Patient seen and staffed with Dr. Cagle.     Karis Gonzalez MD  OBGYN PGY-1  4:33 PM 2020      I agree with note as above. The patient was seen in continuity clinic by the resident doctor and me.  Assessment and plan were jointly made.  Stella Cagle MD        Endometrial " "Biopsy    Menstrual History:  Postmenopausal since age 47.    Time Out - \"Pause for the Cause\"  Just before the procedure begins, through verbal and active participation of team members, verify:                      Initials   Patient Name RJ   Patient  RJ   Procedure to be performed RJ                                                                                                                                      Indication: Postmenopausal bleeding  Faculty:  I was present with the resident throughout the entire procedure.    Consent: Risks, benefits of treatment, and no treatment were discussed.  Patient's questions were elicited and answered.  and Written consent signed and scanned into medical record.    Using a medium Graves speculum, the cervix was visualized. The cervix was prepped with Betadine.  A single tooth tenaculum was applied to the anterior lip of the cervix. The endometrial pipelle was advanced through the cervix after use of an os finder and a sample collected. Two additional passes was made.  The tenaculum was removed from the cervix and the tenaculum site made hemostatic with pressure.    EBL: <5 cc    Complications:  None apparent  Pathology: EMB sample was sent to pathology.  Tolerance of Procedure:  Patient did tolerate the procedure well.     Patient was instructed to call if she experiences any heavy bleeding, severe cramping, or abnormal vaginal discharge.  May take ibuprofen 400-800 mg PO TID PRN or naproxen 500 mg PO BID for cramping.    Will notify patient of results.    Karis Gonzalez MD  OBGYN PGY-1  4:36 PM 2020    I agree with note as above. The patient was seen in continuity clinic by the resident doctor and me.  Assessment and plan were jointly made.  Stella Cagle MD    "

## 2020-10-20 NOTE — PATIENT INSTRUCTIONS
It was good to meet you today Michelle.  We will call you with the results of your pap smear and your endometrial biopsy.   I've placed a referral for you to be seen in our Mental Health Clinic, if you decide that you'd like to see someone to talk to about all of the stressors going on in your life. They may also have recommendations for caregiver support groups that you could consider.     Please let us know if you have any recurrence of your postmenopausal bleeding or if you have any fevers, chills, or signs of infection.  Be well,   Karis Gonzalez MD

## 2020-10-21 ENCOUNTER — TELEPHONE (OUTPATIENT)
Dept: BEHAVIORAL HEALTH | Facility: CLINIC | Age: 60
End: 2020-10-21

## 2020-10-21 ASSESSMENT — ANXIETY QUESTIONNAIRES: GAD7 TOTAL SCORE: 9

## 2020-10-21 NOTE — TELEPHONE ENCOUNTER
10/21/20 WQ referral received from Dr. Stella Cottrell, referring Pt for MH OP TX.  Per Pt, does not want to schedule at this time; will call Intake when decides to schedule. ANGELITAT

## 2020-10-22 LAB
COPATH REPORT: NORMAL
COPATH REPORT: NORMAL
PAP: NORMAL

## 2020-10-23 LAB
FINAL DIAGNOSIS: NORMAL
HPV HR 12 DNA CVX QL NAA+PROBE: NEGATIVE
HPV16 DNA SPEC QL NAA+PROBE: NEGATIVE
HPV18 DNA SPEC QL NAA+PROBE: NEGATIVE
SPECIMEN DESCRIPTION: NORMAL
SPECIMEN SOURCE CVX/VAG CYTO: NORMAL

## 2020-10-27 ENCOUNTER — TELEPHONE (OUTPATIENT)
Dept: OBGYN | Facility: CLINIC | Age: 60
End: 2020-10-27

## 2020-10-27 NOTE — TELEPHONE ENCOUNTER
Discussed pathology results of EMB from 10/20/20 with the patient. Given that she had an endometrial polyp with rare focus of atypical endometrial hyperplasia with a hx of postmenopausal bleeding, will refer to gynecologic oncology for further evaluation and possible treatment. She is comfortable and agreeable with this plan.     Karis Gonzalez MD  OBGYN PGY-1  5:41 PM October 27, 2020

## 2020-10-28 DIAGNOSIS — N95.0 POSTMENOPAUSAL BLEEDING: Primary | ICD-10-CM

## 2020-10-30 NOTE — TELEPHONE ENCOUNTER
ONCOLOGY INTAKE: Records Information      APPT INFORMATION:  Referring provider:  Stella Cagle MD  Referring provider s clinic:  [facility]  Reason for visit/diagnosis: Postmenopausal bleeding  Has patient been notified of appointment date and time?: Yes    RECORDS INFORMATION:  Were the records received with the referral (via [employer])? No    Has patient been seen for any external appt for this diagnosis? No    If yes, where? N/a    Has patient had any imaging or procedures outside of [place] for this condition? No      If Yes, where? N/a    ADDITIONAL INFORMATION:  None

## 2020-11-02 NOTE — TELEPHONE ENCOUNTER
RECORDS STATUS - ALL OTHER DIAGNOSIS      RECORDS RECEIVED FROM: [facility]   DATE RECEIVED: 11/2/20   NOTES STATUS DETAILS   OFFICE NOTE from referring provider Stella Albright MD    Also seen:  OB/GYN - Carlos: 10/20/20  EDIS - Derick: 9/17/20   OFFICE NOTE from medical oncologist NA    DISCHARGE SUMMARY from hospital NA    DISCHARGE REPORT from the ER NA    OPERATIVE REPORT NA    MEDICATION LIST [facility]    CLINICAL TRIAL TREATMENTS TO DATE     LABS     PATHOLOGY REPORTS [facility] 10/20/20: Surg Path   ANYTHING RELATED TO DIAGNOSIS Epic 10/20/20: PAP   GENONOMIC TESTING     TYPE:     IMAGING (NEED IMAGES & REPORT)     CT SCANS     MRI     MAMMO     ULTRASOUND PACS 9/22/20: Epic   PET

## 2020-11-04 NOTE — PROGRESS NOTES
"Michelle Shetty is a 60 year old female who is being evaluated via a billable video visit.      The patient has been notified of following:     \"This video visit will be conducted via a call between you and your physician/provider. We have found that certain health care needs can be provided without the need for an in-person physical exam.  This service lets us provide the care you need with a video conversation.  If a prescription is necessary we can send it directly to your pharmacy.  If lab work is needed we can place an order for that and you can then stop by our lab to have the test done at a later time.    Video visits are billed at different rates depending on your insurance coverage.  Please reach out to your insurance provider with any questions.    If during the course of the call the physician/provider feels a video visit is not appropriate, you will not be charged for this service.\"    Patient has given verbal consent for Video visit? Yes  How would you like to obtain your AVS? MyChart  If you are dropped from the video visit, the video invite should be resent to: Text to cell phone: [phone]  Will anyone else be joining your video visit? No       CONNOR La        Video-Visit Details    Type of service:  Video Visit    Video Start Time: 12:32 PM  Video End Time: 1:00    Originating Location (pt. Location): Home    Distant Location (provider location):  [facility]     Platform used for Video Visit: Antoinette Aguirre MD      .  GYNECOLOGIC  ONCOLOGY CONSULT    Referring provider:    Stella Cagle MD  [address] [address]   RE: Michelle Shetty  : 1960  HANG: 2020    CC: Atypical Endometrial Hyperplasia    HPI: Ms Michelle Shetty is a 60 year old female who presents for consultation regarding new diagnosis of atypical endometrial hyperplasia.    Today she reports family history of her mother who  after " undergoing hysterectomy for some cancer, maybe ovarian cancer. Patient has a lot of concerns about being able to safely undergo surgery. She has onset of post menopausal bleeding starting in 2020.  Minimal current bleeding.    Patient is referred by Dr. Cagle with the below noted work up completed.    20 Pelvic US 13.3 x 6.2 8.2 cm, 3 fibroids 3.8, 4.8 , 1.4 cm    10/20/2020 Endometrial Biopsy: Endometrial polyp with rare focus of atypical endometrial hyperplasia    Medical History is notable for  Atrial fibrillation-rate controled on beta-blocker and is on Xarelto  Chronic Systolic Health Failure: Echo 2017  EF 55%     OBGYN history and Health Maintenance:    Last Pap Smear: 10/2020, NILM, HPV Negative  Last Mammogram:   Last Colonoscopy:     Review of Systems:  Systemic:No weight changes.    Skin : No skin changes or new lesions.   Eye : No changes in vision.   Pulmonary: No cough or SOB.   Cardiovascular: No CP or palpitations  Gastrointestinal : No diarrhea, constipation, abdominal pain. Bowel habits normal.   Genitourinary: No dysuria, urgency or bleeding  Psychiatric: No depression or anxiety  Hematologic : No palpable lymph nodes.   Endocrine : No hot flashes. No heat/cold intolerance.      Neurological: No headaches, no numbness.     Past Medical History:   Diagnosis Date     Anemia      Atrial flutter (H)      Diastolic heart failure (H)        Past Surgical History:   Procedure Laterality Date     BREAST SURGERY      benign tumor left breast     CHOLECYSTECTOMY       H LAP ABLAT UTERINE FIBROIDS W/INTRAOP US GUIDE            Current Outpatient Medications   Medication Sig Dispense Refill     furosemide (LASIX) 40 MG tablet Take 1 tablet (40 mg) by mouth daily as needed 90 tablet 3     lisinopril (ZESTRIL) 5 MG tablet Take 1 tablet (5 mg) by mouth daily 90 tablet 3     Melatonin 10 MG TBCR Take 1 tablet by mouth nightly as needed       metoprolol succinate ER (TOPROL-XL) 100 MG 24 hr  tablet TAKE 1 TABLET(100 MG) BY MOUTH DAILY. MAY TAKE ANOTHER DOSE AFTER 4 HOURS IF HEART RATE REMAINS GREATER THAN 120 DAILY 90 tablet 3     Multiple Vitamins-Minerals (HAIR SKIN AND NAILS FORMULA PO) Take 3 tablets by mouth daily       order for DME Equipment being ordered: pulse oximeter 1 Device 0     rivaroxaban ANTICOAGULANT (XARELTO ANTICOAGULANT) 20 MG TABS tablet TAKE 1 TABLET(20 MG) BY MOUTH DAILY WITH DINNER 90 tablet 3         Allergies   Allergen Reactions     Amoxicillin Anaphylaxis     Codeine        Social History:  Social History     Tobacco Use     Smoking status: Current Some Day Smoker     Packs/day: 0.50     Types: Cigarettes     Smokeless tobacco: Never Used   Substance Use Topics     Alcohol use: Yes     Alcohol/week: 1.0 standard drinks     Types: 1 Glasses of wine per week     Comment: Daily     Lives at home with her family    Family History:   The patient's family history is notable for   Family History   Problem Relation Age of Onset     Diabetes Mother      Hypertension Mother      Ovarian Cancer Mother 40     Arrhythmia Father      Cerebrovascular Disease Father 65        multiple strokes     Myocardial Infarction Father 60     Arrhythmia Brother      Breast Cancer Other      No Known Problems Maternal Grandmother      No Known Problems Maternal Grandfather      No Known Problems Paternal Grandmother      No Known Problems Paternal Grandfather      No Known Problems Sister      No Known Problems Son      No Known Problems Daughter      No Known Problems Maternal Half-Brother      No Known Problems Maternal Half-Sister      No Known Problems Paternal Half-Brother      No Known Problems Paternal Half-Sister      No Known Problems Niece      No Known Problems Nephew      No Known Problems Cousin          Physical Exam:   Video visit: Patient is well appearing in NAD      Assessment: Michelle Shetty is a 60 year old woman with a new diagnosis of atypical endometrial hyperplasia arising in  a polyp.    Current smoker    Uterus with multiple fibroids      Plan:     1.)   I discussed with Michelle the standard recommendation for this diagnosis is a total hysterectomy with removal of both ovaries and fallopian tubes. There is risk of current endometrial cancer and or risk of progressing to cancer up to 30%. Although there are alternative options for treatment with progesterone, this is not the standard approach for women in menopause.     In coordination with her primary care team, Michelle could be optimized to safely undergo surgery and recover.     Michelle has significant concerns due to her mother experiencing complications with her hysterectomy. She will discuss the recommendation with her family and we will consult in 2 weeks to finalize treatment.      2.) Genetic risk factors were assessed: pending final pathology        Rossana Aguirre M.D., MPH,  F.A.C.O.G.  Professor  Department of Ob/Gyn and Women's Health  Division of Gynecologic Oncology  [facility] [place]  [phone]

## 2020-11-05 ENCOUNTER — PRE VISIT (OUTPATIENT)
Dept: ONCOLOGY | Facility: CLINIC | Age: 60
End: 2020-11-05

## 2020-11-05 ENCOUNTER — VIRTUAL VISIT (OUTPATIENT)
Dept: ONCOLOGY | Facility: CLINIC | Age: 60
End: 2020-11-05
Attending: OBSTETRICS & GYNECOLOGY
Payer: COMMERCIAL

## 2020-11-05 DIAGNOSIS — N95.0 POSTMENOPAUSAL BLEEDING: ICD-10-CM

## 2020-11-05 DIAGNOSIS — N85.02 ENDOMETRIAL HYPERPLASIA WITH ATYPIA: Primary | ICD-10-CM

## 2020-11-05 PROCEDURE — 99203 OFFICE O/P NEW LOW 30 MIN: CPT | Mod: GT | Performed by: OBSTETRICS & GYNECOLOGY

## 2020-11-05 PROCEDURE — 999N001193 HC VIDEO/TELEPHONE VISIT; NO CHARGE

## 2020-11-05 NOTE — LETTER
"    11/5/2020         RE: Michelle Shetty  [address]  [facility] [address]        Dear Colleague,    Thank you for referring your patient, Michelle Shetty, to the [facility]. Please see a copy of my visit note below.    Michelle Shetty is a 60 year old female who is being evaluated via a billable video visit.      The patient has been notified of following:     \"This video visit will be conducted via a call between you and your physician/provider. We have found that certain health care needs can be provided without the need for an in-person physical exam.  This service lets us provide the care you need with a video conversation.  If a prescription is necessary we can send it directly to your pharmacy.  If lab work is needed we can place an order for that and you can then stop by our lab to have the test done at a later time.    Video visits are billed at different rates depending on your insurance coverage.  Please reach out to your insurance provider with any questions.    If during the course of the call the physician/provider feels a video visit is not appropriate, you will not be charged for this service.\"    Patient has given verbal consent for Video visit? Yes  How would you like to obtain your AVS? MyChart  If you are dropped from the video visit, the video invite should be resent to: Text to cell phone: [phone]  Will anyone else be joining your video visit? No       CONNOR La        Video-Visit Details    Type of service:  Video Visit    Video Start Time: 12:32 PM  Video End Time: 1:00    Originating Location (pt. Location): Home    Distant Location (provider location):  [facility]     Platform used for Video Visit: Antoinette Aguirre MD      .  GYNECOLOGIC  ONCOLOGY CONSULT    Referring provider:    Stella Cagle MD  [address]  [place] [address]   RE: Michelle DE LEON" Diogenes  : 1960  HANG: 2020    CC: Atypical Endometrial Hyperplasia    HPI: Ms Michelle Shetty is a 60 year old female who presents for consultation regarding new diagnosis of atypical endometrial hyperplasia.    Today she reports family history of her mother who  after undergoing hysterectomy for some cancer, maybe ovarian cancer. Patient has a lot of concerns about being able to safely undergo surgery. She has onset of post menopausal bleeding starting in 2020.  Minimal current bleeding.    Patient is referred by Dr. Cagle with the below noted work up completed.    20 Pelvic US 13.3 x 6.2 8.2 cm, 3 fibroids 3.8, 4.8 , 1.4 cm    10/20/2020 Endometrial Biopsy: Endometrial polyp with rare focus of atypical endometrial hyperplasia    Medical History is notable for  Atrial fibrillation-rate controled on beta-blocker and is on Xarelto  Chronic Systolic Health Failure: Echo 2017  EF 55%     OBGYN history and Health Maintenance:    Last Pap Smear: 10/2020, NILM, HPV Negative  Last Mammogram:   Last Colonoscopy:     Review of Systems:  Systemic:No weight changes.    Skin : No skin changes or new lesions.   Eye : No changes in vision.   Pulmonary: No cough or SOB.   Cardiovascular: No CP or palpitations  Gastrointestinal : No diarrhea, constipation, abdominal pain. Bowel habits normal.   Genitourinary: No dysuria, urgency or bleeding  Psychiatric: No depression or anxiety  Hematologic : No palpable lymph nodes.   Endocrine : No hot flashes. No heat/cold intolerance.      Neurological: No headaches, no numbness.     Past Medical History:   Diagnosis Date     Anemia      Atrial flutter (H)      Diastolic heart failure (H)        Past Surgical History:   Procedure Laterality Date     BREAST SURGERY      benign tumor left breast     CHOLECYSTECTOMY       H LAP ABLAT UTERINE FIBROIDS W/INTRAOP US GUIDE            Current Outpatient Medications   Medication Sig Dispense Refill     furosemide  (LASIX) 40 MG tablet Take 1 tablet (40 mg) by mouth daily as needed 90 tablet 3     lisinopril (ZESTRIL) 5 MG tablet Take 1 tablet (5 mg) by mouth daily 90 tablet 3     Melatonin 10 MG TBCR Take 1 tablet by mouth nightly as needed       metoprolol succinate ER (TOPROL-XL) 100 MG 24 hr tablet TAKE 1 TABLET(100 MG) BY MOUTH DAILY. MAY TAKE ANOTHER DOSE AFTER 4 HOURS IF HEART RATE REMAINS GREATER THAN 120 DAILY 90 tablet 3     Multiple Vitamins-Minerals (HAIR SKIN AND NAILS FORMULA PO) Take 3 tablets by mouth daily       order for DME Equipment being ordered: pulse oximeter 1 Device 0     rivaroxaban ANTICOAGULANT (XARELTO ANTICOAGULANT) 20 MG TABS tablet TAKE 1 TABLET(20 MG) BY MOUTH DAILY WITH DINNER 90 tablet 3         Allergies   Allergen Reactions     Amoxicillin Anaphylaxis     Codeine        Social History:  Social History     Tobacco Use     Smoking status: Current Some Day Smoker     Packs/day: 0.50     Types: Cigarettes     Smokeless tobacco: Never Used   Substance Use Topics     Alcohol use: Yes     Alcohol/week: 1.0 standard drinks     Types: 1 Glasses of wine per week     Comment: Daily     Lives at home with her family    Family History:   The patient's family history is notable for   Family History   Problem Relation Age of Onset     Diabetes Mother      Hypertension Mother      Ovarian Cancer Mother 40     Arrhythmia Father      Cerebrovascular Disease Father 65        multiple strokes     Myocardial Infarction Father 60     Arrhythmia Brother      Breast Cancer Other      No Known Problems Maternal Grandmother      No Known Problems Maternal Grandfather      No Known Problems Paternal Grandmother      No Known Problems Paternal Grandfather      No Known Problems Sister      No Known Problems Son      No Known Problems Daughter      No Known Problems Maternal Half-Brother      No Known Problems Maternal Half-Sister      No Known Problems Paternal Half-Brother      No Known Problems Paternal Half-Sister       No Known Problems Niece      No Known Problems Nephew      No Known Problems Cousin          Physical Exam:   Video visit: Patient is well appearing in NAD      Assessment: Michelle Shetty is a 60 year old woman with a new diagnosis of atypical endometrial hyperplasia arising in a polyp.    Current smoker    Uterus with multiple fibroids      Plan:     1.)   I discussed with Michelle the standard recommendation for this diagnosis is a total hysterectomy with removal of both ovaries and fallopian tubes. There is risk of current endometrial cancer and or risk of progressing to cancer up to 30%. Although there are alternative options for treatment with progesterone, this is not the standard approach for women in menopause.     In coordination with her primary care team, Michelle could be optimized to safely undergo surgery and recover.     Michelle has significant concerns due to her mother experiencing complications with her hysterectomy. She will discuss the recommendation with her family and we will consult in 2 weeks to finalize treatment.      2.) Genetic risk factors were assessed: pending final pathology        Rossana Aguirre M.D., MPH,  F.A.C.O.G.  Professor  Department of Ob/Gyn and Women's Health  Division of Gynecologic Oncology  [facility]/[employer]  [phone]

## 2020-11-19 ENCOUNTER — VIRTUAL VISIT (OUTPATIENT)
Dept: ONCOLOGY | Facility: CLINIC | Age: 60
End: 2020-11-19
Attending: OBSTETRICS & GYNECOLOGY
Payer: COMMERCIAL

## 2020-11-19 DIAGNOSIS — Z79.01 ANTICOAGULATED BY ANTICOAGULATION TREATMENT: ICD-10-CM

## 2020-11-19 DIAGNOSIS — N85.02 ENDOMETRIAL HYPERPLASIA WITH ATYPIA: ICD-10-CM

## 2020-11-19 DIAGNOSIS — N93.9 VAGINAL BLEEDING: Primary | ICD-10-CM

## 2020-11-19 PROCEDURE — 99214 OFFICE O/P EST MOD 30 MIN: CPT | Mod: GT | Performed by: OBSTETRICS & GYNECOLOGY

## 2020-11-19 NOTE — PROGRESS NOTES
"Michelle Shetty is a 60 year old female who is being evaluated via a billable video visit.      The patient has been notified of following:     \"This video visit will be conducted via a call between you and your physician/provider. We have found that certain health care needs can be provided without the need for an in-person physical exam.  This service lets us provide the care you need with a video conversation.  If a prescription is necessary we can send it directly to your pharmacy.  If lab work is needed we can place an order for that and you can then stop by our lab to have the test done at a later time.    Video visits are billed at different rates depending on your insurance coverage.  Please reach out to your insurance provider with any questions.    If during the course of the call the physician/provider feels a video visit is not appropriate, you will not be charged for this service.\"    Patient has given verbal consent for Video visit? Yes  How would you like to obtain your AVS? Mail a copy  If you are dropped from the video visit, the video invite should be resent to: Text to cell phone: [phone]  Will anyone else be joining your video visit? No      Cynthia RYAN    Video-Visit Details    Type of service:  Video Visit    Video Start Time: 3:17 PM  Video End Time: 3:45 PM    Originating Location (pt. Location): Home    Distant Location (provider location):  [facility]     Platform used for Video Visit: Antoinette Aguirre MD        "

## 2020-11-19 NOTE — PROGRESS NOTES
GYNECOLOGIC  ONCOLOGY CLINIC NOTE    Referring provider:    Stella Cagle MD  [address]  [place] [address]   RE: Michelle Shetty  : 1960  HANG: 2020      CC: Atypical Endometrial Hyperplasia    HPI: Ms Michelle Shetty is a 60 year old female who presents for consultation regarding new diagnosis of atypical endometrial hyperplasia.    She reports family history of her mother who  after undergoing hysterectomy for some cancer, maybe ovarian cancer. Patient has a lot of concerns about being able to safely undergo surgery. She has onset of post menopausal bleeding starting in 2020.  Minimal current bleeding. She is currently on anticoagulation for history of atrial flutter.    Today she reports having discussed the need for a hysterectomy with her family, and at this time due to the ongoing COVID she would prefer to defer any surgery.     Patient is referred by Dr. Cagle with the below noted work up completed.    20 Pelvic US 13.3 x 6.2 8.2 cm, 3 fibroids 3.8, 4.8 , 1.4 cm    10/20/2020 Endometrial Biopsy: Endometrial polyp with rare focus of atypical endometrial hyperplasia    Medical History is notable for  Atrial fibrillation-rate controled on beta-blocker and is on Xarelto  Chronic Systolic Health Failure: Echo 2017  EF 55%     OBGYN history and Health Maintenance:    Last Pap Smear: 10/2020, NILM, HPV Negative  Last Mammogram:   Last Colonoscopy:     Review of Systems:  Systemic:No weight changes.    Skin : No skin changes or new lesions.   Eye : No changes in vision.   Pulmonary: No cough or SOB.   Cardiovascular: No CP or palpitations  Gastrointestinal : No diarrhea, constipation, abdominal pain. Bowel habits normal.   Genitourinary: No dysuria, urgency or bleeding  Psychiatric: No depression or anxiety  Hematologic : No palpable lymph nodes.   Endocrine : No hot flashes. No heat/cold intolerance.      Neurological: No headaches, no  numbness.     Past Medical History:   Diagnosis Date     Anemia      Atrial flutter (H)      Diastolic heart failure (H)        Past Surgical History:   Procedure Laterality Date     BREAST SURGERY      benign tumor left breast     CHOLECYSTECTOMY       H LAP ABLAT UTERINE FIBROIDS W/INTRAOP US GUIDE            Current Outpatient Medications   Medication Sig Dispense Refill     furosemide (LASIX) 40 MG tablet Take 1 tablet (40 mg) by mouth daily as needed 90 tablet 3     lisinopril (ZESTRIL) 5 MG tablet Take 1 tablet (5 mg) by mouth daily 90 tablet 3     Melatonin 10 MG TBCR Take 1 tablet by mouth nightly as needed       metoprolol succinate ER (TOPROL-XL) 100 MG 24 hr tablet TAKE 1 TABLET(100 MG) BY MOUTH DAILY. MAY TAKE ANOTHER DOSE AFTER 4 HOURS IF HEART RATE REMAINS GREATER THAN 120 DAILY 90 tablet 3     Multiple Vitamins-Minerals (HAIR SKIN AND NAILS FORMULA PO) Take 3 tablets by mouth daily       order for DME Equipment being ordered: pulse oximeter 1 Device 0     rivaroxaban ANTICOAGULANT (XARELTO ANTICOAGULANT) 20 MG TABS tablet TAKE 1 TABLET(20 MG) BY MOUTH DAILY WITH DINNER 90 tablet 3         Allergies   Allergen Reactions     Amoxicillin Anaphylaxis     Codeine        Social History:  Social History     Tobacco Use     Smoking status: Current Some Day Smoker     Packs/day: 0.50     Types: Cigarettes     Smokeless tobacco: Never Used   Substance Use Topics     Alcohol use: Yes     Alcohol/week: 1.0 standard drinks     Types: 1 Glasses of wine per week     Comment: Daily     Lives at home with her family    Family History:   The patient's family history is notable for   Family History   Problem Relation Age of Onset     Diabetes Mother      Hypertension Mother      Ovarian Cancer Mother 40     Arrhythmia Father      Cerebrovascular Disease Father 65        multiple strokes     Myocardial Infarction Father 60     Arrhythmia Brother      Breast Cancer Other      No Known Problems Maternal Grandmother      No  Known Problems Maternal Grandfather      No Known Problems Paternal Grandmother      No Known Problems Paternal Grandfather      No Known Problems Sister      No Known Problems Son      No Known Problems Daughter      No Known Problems Maternal Half-Brother      No Known Problems Maternal Half-Sister      No Known Problems Paternal Half-Brother      No Known Problems Paternal Half-Sister      No Known Problems Niece      No Known Problems Nephew      No Known Problems Cousin          Physical Exam:   Video visit: Patient is well appearing in NAD      Assessment: Michelle Shetty is a 60 year old woman with a new diagnosis of atypical endometrial hyperplasia arising in a polyp.    Current smoker    Uterus with multiple fibroids    On anticoagulation      Plan:     1.)   I discussed with Michelle the standard recommendation for this diagnosis is a total hysterectomy with removal of both ovaries and fallopian tubes. There is risk of current endometrial cancer and or risk of progressing to cancer up to 30%. Although there are alternative options for treatment with progesterone, this is not the standard approach for women in menopause.     At this time she prefers to avoid any major surgery but is agreeable to alternative treatment. Discussed oral progesterone with Megace versus Mirena IUD placement including discussion of side effects.     Patient agrees to follow up in clinic for Mirena IUD placement on Dec 3rd, 2020.     Plan to follow up in 3 months for next step in treatment plan       2.) Follow up with PCP for all other concerns          Rossana Aguirre M.D., MPH,  F.A.C.O.G.  Professor  Department of Ob/Gyn and Women's Health  Division of Gynecologic Oncology  [facility]/[employer]  [phone]      Time spent with patient 25 minutes and more than 50 % of the time was spent reviewing data, counseling the patient, discussing treatment options and coordination of care.

## 2020-11-19 NOTE — LETTER
"    11/19/2020         RE: Michelle Shetty  [address]  [facility] [address]        Dear Colleague,    Thank you for referring your patient, Michelle Shetty, to the [facility]. Please see a copy of my visit note below.    Michelle Shetty is a 60 year old female who is being evaluated via a billable video visit.      The patient has been notified of following:     \"This video visit will be conducted via a call between you and your physician/provider. We have found that certain health care needs can be provided without the need for an in-person physical exam.  This service lets us provide the care you need with a video conversation.  If a prescription is necessary we can send it directly to your pharmacy.  If lab work is needed we can place an order for that and you can then stop by our lab to have the test done at a later time.    Video visits are billed at different rates depending on your insurance coverage.  Please reach out to your insurance provider with any questions.    If during the course of the call the physician/provider feels a video visit is not appropriate, you will not be charged for this service.\"    Patient has given verbal consent for Video visit? Yes  How would you like to obtain your AVS? Mail a copy  If you are dropped from the video visit, the video invite should be resent to: Text to cell phone: [phone]  Will anyone else be joining your video visit? No      Cynthia RYAN    Video-Visit Details    Type of service:  Video Visit    Video Start Time: 3:17 PM  Video End Time: 3:45 PM    Originating Location (pt. Location): Home    Distant Location (provider location):  [facility]     Platform used for Video Visit: Antoinette Aguirre MD          GYNECOLOGIC  ONCOLOGY CLINIC NOTE    Referring provider:    Stella Cagle MD  [address]  [place] [address]   RE: Michelle DE LEON" Diogenes  : 1960  HANG: 2020      CC: Atypical Endometrial Hyperplasia    HPI: Ms Michelle Shetty is a 60 year old female who presents for consultation regarding new diagnosis of atypical endometrial hyperplasia.    She reports family history of her mother who  after undergoing hysterectomy for some cancer, maybe ovarian cancer. Patient has a lot of concerns about being able to safely undergo surgery. She has onset of post menopausal bleeding starting in 2020.  Minimal current bleeding. She is currently on anticoagulation for history of atrial flutter.    Today she reports having discussed the need for a hysterectomy with her family, and at this time due to the ongoing COVID she would prefer to defer any surgery.     Patient is referred by Dr. Cagle with the below noted work up completed.    20 Pelvic US 13.3 x 6.2 8.2 cm, 3 fibroids 3.8, 4.8 , 1.4 cm    10/20/2020 Endometrial Biopsy: Endometrial polyp with rare focus of atypical endometrial hyperplasia    Medical History is notable for  Atrial fibrillation-rate controled on beta-blocker and is on Xarelto  Chronic Systolic Health Failure: Echo 2017  EF 55%     OBGYN history and Health Maintenance:    Last Pap Smear: 10/2020, NILM, HPV Negative  Last Mammogram:   Last Colonoscopy:     Review of Systems:  Systemic:No weight changes.    Skin : No skin changes or new lesions.   Eye : No changes in vision.   Pulmonary: No cough or SOB.   Cardiovascular: No CP or palpitations  Gastrointestinal : No diarrhea, constipation, abdominal pain. Bowel habits normal.   Genitourinary: No dysuria, urgency or bleeding  Psychiatric: No depression or anxiety  Hematologic : No palpable lymph nodes.   Endocrine : No hot flashes. No heat/cold intolerance.      Neurological: No headaches, no numbness.     Past Medical History:   Diagnosis Date     Anemia      Atrial flutter (H)      Diastolic heart failure (H)        Past Surgical History:    Procedure Laterality Date     BREAST SURGERY      benign tumor left breast     CHOLECYSTECTOMY       H LAP ABLAT UTERINE FIBROIDS W/INTRAOP US GUIDE            Current Outpatient Medications   Medication Sig Dispense Refill     furosemide (LASIX) 40 MG tablet Take 1 tablet (40 mg) by mouth daily as needed 90 tablet 3     lisinopril (ZESTRIL) 5 MG tablet Take 1 tablet (5 mg) by mouth daily 90 tablet 3     Melatonin 10 MG TBCR Take 1 tablet by mouth nightly as needed       metoprolol succinate ER (TOPROL-XL) 100 MG 24 hr tablet TAKE 1 TABLET(100 MG) BY MOUTH DAILY. MAY TAKE ANOTHER DOSE AFTER 4 HOURS IF HEART RATE REMAINS GREATER THAN 120 DAILY 90 tablet 3     Multiple Vitamins-Minerals (HAIR SKIN AND NAILS FORMULA PO) Take 3 tablets by mouth daily       order for DME Equipment being ordered: pulse oximeter 1 Device 0     rivaroxaban ANTICOAGULANT (XARELTO ANTICOAGULANT) 20 MG TABS tablet TAKE 1 TABLET(20 MG) BY MOUTH DAILY WITH DINNER 90 tablet 3         Allergies   Allergen Reactions     Amoxicillin Anaphylaxis     Codeine        Social History:  Social History     Tobacco Use     Smoking status: Current Some Day Smoker     Packs/day: 0.50     Types: Cigarettes     Smokeless tobacco: Never Used   Substance Use Topics     Alcohol use: Yes     Alcohol/week: 1.0 standard drinks     Types: 1 Glasses of wine per week     Comment: Daily     Lives at home with her family    Family History:   The patient's family history is notable for   Family History   Problem Relation Age of Onset     Diabetes Mother      Hypertension Mother      Ovarian Cancer Mother 40     Arrhythmia Father      Cerebrovascular Disease Father 65        multiple strokes     Myocardial Infarction Father 60     Arrhythmia Brother      Breast Cancer Other      No Known Problems Maternal Grandmother      No Known Problems Maternal Grandfather      No Known Problems Paternal Grandmother      No Known Problems Paternal Grandfather      No Known Problems  Sister      No Known Problems Son      No Known Problems Daughter      No Known Problems Maternal Half-Brother      No Known Problems Maternal Half-Sister      No Known Problems Paternal Half-Brother      No Known Problems Paternal Half-Sister      No Known Problems Niece      No Known Problems Nephew      No Known Problems Cousin          Physical Exam:   Video visit: Patient is well appearing in NAD      Assessment: Michelle Shetty is a 60 year old woman with a new diagnosis of atypical endometrial hyperplasia arising in a polyp.    Current smoker    Uterus with multiple fibroids    On anticoagulation      Plan:     1.)   I discussed with Michelle the standard recommendation for this diagnosis is a total hysterectomy with removal of both ovaries and fallopian tubes. There is risk of current endometrial cancer and or risk of progressing to cancer up to 30%. Although there are alternative options for treatment with progesterone, this is not the standard approach for women in menopause.     At this time she prefers to avoid any major surgery but is agreeable to alternative treatment. Discussed oral progesterone with Megace versus Mirena IUD placement including discussion of side effects.     Patient agrees to follow up in clinic for Mirena IUD placement on Dec 3rd, 2020.     Plan to follow up in 3 months for next step in treatment plan       2.) Follow up with PCP for all other concerns          Rossana Aguirre M.D., MPH,  F.A.C.O.G.  Professor  Department of Ob/Gyn and Women's Health  Division of Gynecologic Oncology  [facility]  [phone]      Time spent with patient 25 minutes and more than 50 % of the time was spent reviewing data, counseling the patient, discussing treatment options and coordination of care.        Again, thank you for allowing me to participate in the care of your patient.        Sincerely,        Rossana Aguirre MD

## 2020-11-22 ENCOUNTER — HEALTH MAINTENANCE LETTER (OUTPATIENT)
Age: 60
End: 2020-11-22

## 2020-12-09 NOTE — PROGRESS NOTES
GYNECOLOGIC  ONCOLOGY CLINIC NOTE    Referring provider:    Stella Cagle MD  [address] [facility] [address]  [place] [address]   RE: Michelle Shetty  : 1960  HANG: 12/10/20      CC: Atypical Endometrial Hyperplasia    HPI: Ms Michelle Shetty is a 60 year old female who presents for management of  atypical endometrial hyperplasia.    After consultation with her family she has decided to have Mirena IUD placed as a medical management option. At this time due to COVID and her mothers history of surgical complication, she will defer any surgery.    She reports family history of her mother who  after undergoing hysterectomy for some cancer, maybe ovarian cancer. Patient has a lot of concerns about being able to safely undergo surgery.     She has onset of post menopausal bleeding starting in 2020.  Minimal current bleeding. She is currently on anticoagulation for history of atrial flutter.    Today she reports having discussed the need for a hysterectomy with her family, and at this time due to the ongoing COVID she would prefer to defer any surgery.     Patient is referred by Dr. Cagle with the below noted work up completed.    20 Pelvic US 13.3 x 6.2 8.2 cm, 3 fibroids 3.8, 4.8 , 1.4 cm    10/20/2020 Endometrial Biopsy: Endometrial polyp with rare focus of atypical endometrial hyperplasia    Medical History is notable for  Atrial fibrillation-rate controled on beta-blocker and is on Xarelto  Chronic Systolic Health Failure: Echo 2017  EF 55%     OBGYN history and Health Maintenance:    Last Pap Smear: 10/2020, NILM, HPV Negative  Last Mammogram:  None recent  Last Colonoscopy:  None recent    Review of Systems:  12 point review of system with pertinent positive as noted above    Past Medical History:   Diagnosis Date     Anemia      Atrial flutter (H)      Diastolic heart failure (H)        Past Surgical History:   Procedure Laterality Date     BREAST SURGERY      benign tumor  left breast     CHOLECYSTECTOMY       H LAP ABLAT UTERINE FIBROIDS W/INTRAOP US GUIDE            Current Outpatient Medications   Medication Sig Dispense Refill     furosemide (LASIX) 40 MG tablet Take 1 tablet (40 mg) by mouth daily as needed 90 tablet 3     lisinopril (ZESTRIL) 5 MG tablet Take 1 tablet (5 mg) by mouth daily 90 tablet 3     Melatonin 10 MG TBCR Take 1 tablet by mouth nightly as needed       metoprolol succinate ER (TOPROL-XL) 100 MG 24 hr tablet TAKE 1 TABLET(100 MG) BY MOUTH DAILY. MAY TAKE ANOTHER DOSE AFTER 4 HOURS IF HEART RATE REMAINS GREATER THAN 120 DAILY 90 tablet 3     Multiple Vitamins-Minerals (HAIR SKIN AND NAILS FORMULA PO) Take 3 tablets by mouth daily       order for DME Equipment being ordered: pulse oximeter 1 Device 0     rivaroxaban ANTICOAGULANT (XARELTO ANTICOAGULANT) 20 MG TABS tablet TAKE 1 TABLET(20 MG) BY MOUTH DAILY WITH DINNER 90 tablet 3         Allergies   Allergen Reactions     Amoxicillin Anaphylaxis     Codeine        Social History:  Social History     Tobacco Use     Smoking status: Current Some Day Smoker     Packs/day: 0.50     Types: Cigarettes     Smokeless tobacco: Never Used   Substance Use Topics     Alcohol use: Yes     Alcohol/week: 1.0 standard drinks     Types: 1 Glasses of wine per week     Comment: Daily     Lives at home with her family    Family History:   The patient's family history is notable for   Family History   Problem Relation Age of Onset     Diabetes Mother      Hypertension Mother      Ovarian Cancer Mother 40     Arrhythmia Father      Cerebrovascular Disease Father 65        multiple strokes     Myocardial Infarction Father 60     Arrhythmia Brother      Breast Cancer Other      No Known Problems Maternal Grandmother      No Known Problems Maternal Grandfather      No Known Problems Paternal Grandmother      No Known Problems Paternal Grandfather      No Known Problems Sister      No Known Problems Son      No Known Problems Daughter       No Known Problems Maternal Half-Brother      No Known Problems Maternal Half-Sister      No Known Problems Paternal Half-Brother      No Known Problems Paternal Half-Sister      No Known Problems Niece      No Known Problems Nephew      No Known Problems Cousin          Physical Exam:   /79   Pulse 82   Temp 98.6  F (37  C) (Oral)   Wt 119.3 kg (263 lb)   SpO2 99%   BMI 36.68 kg/m     Gen: alert and o x 3 in NAD  Abd: soft, nn-tender, no mas      PROCEDURE NOTE: -- IUD Insertion    Reason for Insertion: abnormal uterine bleeding    Premedicated with ibuprofen.  Under sterile technique, cervix was visualized with speculum and prepped with Betadine solution swab x 3. Tenaculum was placed for stability. The uterus was gently straightened and sounded to 8.0 cm. IUD prepared for placement, and IUD inserted according to 's instructions without difficulty or significant resitance, and deployed at the fundus. The strings were visualized and trimmed to 7.0 cm from the external os. Tenaculum was removed and hemostasis noted. Speculum removed.  Patient tolerated procedure well.      EBL: minimal    Complications: none    ASSESSMENT:     ICD-10-CM    1. Vaginal bleeding  N93.9 levonorgestrel (MIRENA) 20 MCG/24HR IUD 20 mcg         Assessment: Michelle Shetty is a 60 year old woman with a new diagnosis of atypical endometrial hyperplasia arising in a poly. After extensive counseling on treatment options, patient opted for non-surgical conservative medical option. Mirena IUD placed today.    Current smoker    Uterus with multiple fibroids    On anticoagulation      Plan:     1.)   She was advised to use pain medications  as needed for mild to moderate pain. Advised to follow-up in clinic if there is concern for IUD displacement or loss.    2.)   Follow up in 3 months for IUD assessment and to discuss surgery         Rossana Aguirre M.D., MPH,  F.A.C.O.G.  Professor  Department of Ob/Gyn and Women's  Health  Division of Gynecologic Oncology  [facility] [place]  [phone]      Time spent with patient 25 minutes and more than 50 % of the time was spent reviewing data, counseling the patient, discussing treatment options and coordination of care.

## 2020-12-10 ENCOUNTER — ONCOLOGY VISIT (OUTPATIENT)
Dept: ONCOLOGY | Facility: CLINIC | Age: 60
End: 2020-12-10
Attending: OBSTETRICS & GYNECOLOGY
Payer: COMMERCIAL

## 2020-12-10 VITALS
HEART RATE: 82 BPM | DIASTOLIC BLOOD PRESSURE: 79 MMHG | SYSTOLIC BLOOD PRESSURE: 116 MMHG | WEIGHT: 263 LBS | BODY MASS INDEX: 36.68 KG/M2 | TEMPERATURE: 98.6 F | OXYGEN SATURATION: 99 %

## 2020-12-10 DIAGNOSIS — N93.9 VAGINAL BLEEDING: Primary | ICD-10-CM

## 2020-12-10 PROCEDURE — 58999 UNLISTED PX FML GENITAL SYS: CPT | Performed by: OBSTETRICS & GYNECOLOGY

## 2020-12-10 PROCEDURE — 58300 INSERT INTRAUTERINE DEVICE: CPT

## 2020-12-10 PROCEDURE — 58999 UNLISTED PX FML GENITAL SYS: CPT

## 2020-12-10 PROCEDURE — G0463 HOSPITAL OUTPT CLINIC VISIT: HCPCS

## 2020-12-10 PROCEDURE — 99214 OFFICE O/P EST MOD 30 MIN: CPT | Mod: 25 | Performed by: OBSTETRICS & GYNECOLOGY

## 2020-12-10 ASSESSMENT — PAIN SCALES - GENERAL: PAINLEVEL: NO PAIN (0)

## 2020-12-10 NOTE — LETTER
12/10/2020         RE: Michelle Shetty  [address]  [facility] [address]        Dear Colleague,    Thank you for referring your patient, Michelle Shetty, to the [facility]. Please see a copy of my visit note below.    GYNECOLOGIC  ONCOLOGY CLINIC NOTE    Referring provider:    Stella Cagle MD  [address]  [place] [address]   RE: Michelle Shetty  : 1960  HANG: 12/10/20      CC: Atypical Endometrial Hyperplasia    HPI: Ms Michelle Shetty is a 60 year old female who presents for management of  atypical endometrial hyperplasia.    After consultation with her family she has decided to have Mirena IUD placed as a medical management option. At this time due to COVID and her mothers history of surgical complication, she will defer any surgery.    She reports family history of her mother who  after undergoing hysterectomy for some cancer, maybe ovarian cancer. Patient has a lot of concerns about being able to safely undergo surgery.     She has onset of post menopausal bleeding starting in 2020.  Minimal current bleeding. She is currently on anticoagulation for history of atrial flutter.    Today she reports having discussed the need for a hysterectomy with her family, and at this time due to the ongoing COVID she would prefer to defer any surgery.     Patient is referred by Dr. Cagle with the below noted work up completed.    20 Pelvic US 13.3 x 6.2 8.2 cm, 3 fibroids 3.8, 4.8 , 1.4 cm    10/20/2020 Endometrial Biopsy: Endometrial polyp with rare focus of atypical endometrial hyperplasia    Medical History is notable for  Atrial fibrillation-rate controled on beta-blocker and is on Xarelto  Chronic Systolic Health Failure: Echo 2017  EF 55%     OBGYN history and Health Maintenance:    Last Pap Smear: 10/2020, NILM, HPV Negative  Last Mammogram:  None recent  Last Colonoscopy:  None recent    Review of Systems:  12  point review of system with pertinent positive as noted above    Past Medical History:   Diagnosis Date     Anemia      Atrial flutter (H)      Diastolic heart failure (H)        Past Surgical History:   Procedure Laterality Date     BREAST SURGERY      benign tumor left breast     CHOLECYSTECTOMY       H LAP ABLAT UTERINE FIBROIDS W/INTRAOP US GUIDE            Current Outpatient Medications   Medication Sig Dispense Refill     furosemide (LASIX) 40 MG tablet Take 1 tablet (40 mg) by mouth daily as needed 90 tablet 3     lisinopril (ZESTRIL) 5 MG tablet Take 1 tablet (5 mg) by mouth daily 90 tablet 3     Melatonin 10 MG TBCR Take 1 tablet by mouth nightly as needed       metoprolol succinate ER (TOPROL-XL) 100 MG 24 hr tablet TAKE 1 TABLET(100 MG) BY MOUTH DAILY. MAY TAKE ANOTHER DOSE AFTER 4 HOURS IF HEART RATE REMAINS GREATER THAN 120 DAILY 90 tablet 3     Multiple Vitamins-Minerals (HAIR SKIN AND NAILS FORMULA PO) Take 3 tablets by mouth daily       order for DME Equipment being ordered: pulse oximeter 1 Device 0     rivaroxaban ANTICOAGULANT (XARELTO ANTICOAGULANT) 20 MG TABS tablet TAKE 1 TABLET(20 MG) BY MOUTH DAILY WITH DINNER 90 tablet 3         Allergies   Allergen Reactions     Amoxicillin Anaphylaxis     Codeine        Social History:  Social History     Tobacco Use     Smoking status: Current Some Day Smoker     Packs/day: 0.50     Types: Cigarettes     Smokeless tobacco: Never Used   Substance Use Topics     Alcohol use: Yes     Alcohol/week: 1.0 standard drinks     Types: 1 Glasses of wine per week     Comment: Daily     Lives at home with her family    Family History:   The patient's family history is notable for   Family History   Problem Relation Age of Onset     Diabetes Mother      Hypertension Mother      Ovarian Cancer Mother 40     Arrhythmia Father      Cerebrovascular Disease Father 65        multiple strokes     Myocardial Infarction Father 60     Arrhythmia Brother      Breast Cancer Other       No Known Problems Maternal Grandmother      No Known Problems Maternal Grandfather      No Known Problems Paternal Grandmother      No Known Problems Paternal Grandfather      No Known Problems Sister      No Known Problems Son      No Known Problems Daughter      No Known Problems Maternal Half-Brother      No Known Problems Maternal Half-Sister      No Known Problems Paternal Half-Brother      No Known Problems Paternal Half-Sister      No Known Problems Niece      No Known Problems Nephew      No Known Problems Cousin          Physical Exam:   /79   Pulse 82   Temp 98.6  F (37  C) (Oral)   Wt 119.3 kg (263 lb)   SpO2 99%   BMI 36.68 kg/m     Gen: alert and o x 3 in NAD  Abd: soft, nn-tender, no mas      PROCEDURE NOTE: -- IUD Insertion    Reason for Insertion: abnormal uterine bleeding    Premedicated with ibuprofen.  Under sterile technique, cervix was visualized with speculum and prepped with Betadine solution swab x 3. Tenaculum was placed for stability. The uterus was gently straightened and sounded to 8.0 cm. IUD prepared for placement, and IUD inserted according to 's instructions without difficulty or significant resitance, and deployed at the fundus. The strings were visualized and trimmed to 7.0 cm from the external os. Tenaculum was removed and hemostasis noted. Speculum removed.  Patient tolerated procedure well.      EBL: minimal    Complications: none    ASSESSMENT:     ICD-10-CM    1. Vaginal bleeding  N93.9 levonorgestrel (MIRENA) 20 MCG/24HR IUD 20 mcg         Assessment: Michelle Shetty is a 60 year old woman with a new diagnosis of atypical endometrial hyperplasia arising in a poly. After extensive counseling on treatment options, patient opted for non-surgical conservative medical option. Mirena IUD placed today.    Current smoker    Uterus with multiple fibroids    On anticoagulation      Plan:     1.)   She was advised to use pain medications  as needed for mild  to moderate pain. Advised to follow-up in clinic if there is concern for IUD displacement or loss.    2.)   Follow up in 3 months for IUD assessment and to discuss surgery         Rossana Aguirre M.D., MPH,  F.A.C.O.G.  Professor  Department of Ob/Gyn and Women's Health  Division of Gynecologic Oncology  [facility]  [phone]      Time spent with patient 25 minutes and more than 50 % of the time was spent reviewing data, counseling the patient, discussing treatment options and coordination of care.        Again, thank you for allowing me to participate in the care of your patient.        Sincerely,        Rossana Aguirre MD

## 2020-12-10 NOTE — NURSING NOTE
"Oncology Rooming Note    December 10, 2020 3:33 PM   Michelle Shetty is a 60 year old female who presents for:    Chief Complaint   Patient presents with     Oncology Clinic Visit     endometrial hyperplasia with atypia     Initial Vitals: /79   Pulse 82   Temp 98.6  F (37  C) (Oral)   Wt 119.3 kg (263 lb)   SpO2 99%   BMI 36.68 kg/m   Estimated body mass index is 36.68 kg/m  as calculated from the following:    Height as of 10/20/20: 1.803 m (5' 11\").    Weight as of this encounter: 119.3 kg (263 lb). Body surface area is 2.44 meters squared.  No Pain (0) Comment: Data Unavailable   No LMP recorded. Patient is postmenopausal.  Allergies reviewed: Yes  Medications reviewed: Yes    Medications: Medication refills not needed today.  Pharmacy name entered into [employer]:    [id number] - [facility], [place] - [address].  [id number] - [facility] [place], [place] - [address]    Clinical concerns: no new concerns       Crystal Tesfaye CMA            "

## 2020-12-28 ENCOUNTER — TELEPHONE (OUTPATIENT)
Dept: ONCOLOGY | Facility: CLINIC | Age: 60
End: 2020-12-28

## 2020-12-28 ENCOUNTER — PATIENT OUTREACH (OUTPATIENT)
Dept: ONCOLOGY | Facility: CLINIC | Age: 60
End: 2020-12-28

## 2020-12-28 NOTE — PROGRESS NOTES
RN reached out to patient to discuss symptoms concern after IUD placement.     Patient denies soaking a pad an hour of any other signs and symptoms of concern.     Patient states symptoms similar to a period starting with cramping and then vaginal bleeding.     RN and patient normal symptoms after IUD placement versus concerning symptoms.     Patient updated on when she should seek more immediate care versus calling clinic.     Patient will observe symptoms and will update RN if bleeding increases and or doesn't improve.     Patient appreciative of RN time.     Amy Handley RN

## 2020-12-28 NOTE — TELEPHONE ENCOUNTER
"Pt called in to triage stating she had a Mirena IUD placed 12/10, experienced cramping for 7 days then the last 7-10 days have had moderate bleeding daily. Changing pads 3-4 times a day. Pt is on Xarelto for A-fib, denied any other bleeding or bruising. Just wondering if this was normal and how much longer to expect bleeding, it's bright red blood \"like a period\". Denied any more cramping, feeling lightheaded or dizzy. Routed to care team for follow-up.  "

## 2021-03-04 ENCOUNTER — ONCOLOGY VISIT (OUTPATIENT)
Dept: ONCOLOGY | Facility: CLINIC | Age: 61
End: 2021-03-04
Attending: OBSTETRICS & GYNECOLOGY
Payer: COMMERCIAL

## 2021-03-04 VITALS
HEART RATE: 84 BPM | RESPIRATION RATE: 18 BRPM | SYSTOLIC BLOOD PRESSURE: 127 MMHG | WEIGHT: 262 LBS | TEMPERATURE: 98.9 F | BODY MASS INDEX: 36.54 KG/M2 | DIASTOLIC BLOOD PRESSURE: 81 MMHG | OXYGEN SATURATION: 98 %

## 2021-03-04 DIAGNOSIS — N85.02 ENDOMETRIAL HYPERPLASIA WITH ATYPIA: Primary | ICD-10-CM

## 2021-03-04 PROCEDURE — 999N000102 HC STATISTIC NO CHARGE CLINIC VISIT

## 2021-03-04 PROCEDURE — 99214 OFFICE O/P EST MOD 30 MIN: CPT | Performed by: OBSTETRICS & GYNECOLOGY

## 2021-03-04 PROCEDURE — G0463 HOSPITAL OUTPT CLINIC VISIT: HCPCS

## 2021-03-04 ASSESSMENT — PAIN SCALES - GENERAL: PAINLEVEL: NO PAIN (0)

## 2021-03-04 NOTE — LETTER
3/4/2021         RE: Michelle Shetty  [address]  [facility] [address]        Dear Colleague,    Thank you for referring your patient, Michelle Shetty, to the [facility]. Please see a copy of my visit note below.    GYNECOLOGIC  ONCOLOGY CLINIC NOTE    Referring provider:    Stella Cagle MD  [address]  [place] [address]       RE: Michelle Shetty  : 1960  HANG: 3/4/2021      CC: Atypical Endometrial Hyperplasia    HPI: Ms Michelle Shetty is a 60 year old female who presents for management of  atypical endometrial hyperplasia.    Today she presents for the scheduled endometrial biopsy, three months after Mirena IUD placement. She reports she is doing well. She is not ready for surgery and feels the Mirena IUD must be working, her bleeding has decreased.    History to date:  She reports family history of her mother who  after undergoing hysterectomy for some cancer, maybe ovarian cancer. Patient has a lot of concerns about being able to safely undergo surgery.     She has onset of post menopausal bleeding starting in 2020.  Minimal current bleeding. She is currently on anticoagulation for history of atrial flutter.    Today she reports having discussed the need for a hysterectomy with her family, and at this time due to the ongoing COVID she would prefer to defer any surgery.     Patient is referred by Dr. Cagle with the below noted work up completed.    20 Pelvic US 13.3 x 6.2 8.2 cm, 3 fibroids 3.8, 4.8 , 1.4 cm    10/20/2020 Endometrial Biopsy: Endometrial polyp with rare focus of atypical endometrial hyperplasia  12/10/2021 Mirena IUD placement    Medical History is notable for  Atrial fibrillation-rate controled on beta-blocker and is on Xarelto  Chronic Systolic Health Failure: Echo 2017  EF 55%     OBGYN history and Health Maintenance:    Last Pap Smear: 10/2020, NILM, HPV Negative  Last Mammogram:  None  recent  Last Colonoscopy:  None recent    Review of Systems:  12 point review of system with pertinent positive as noted above    Past Medical History:   Diagnosis Date     Anemia      Atrial flutter (H)      Diastolic heart failure (H)        Past Surgical History:   Procedure Laterality Date     BREAST SURGERY      benign tumor left breast     CHOLECYSTECTOMY       H LAP ABLAT UTERINE FIBROIDS W/INTRAOP US GUIDE            Current Outpatient Medications   Medication Sig Dispense Refill     furosemide (LASIX) 40 MG tablet Take 1 tablet (40 mg) by mouth daily as needed 90 tablet 3     lisinopril (ZESTRIL) 5 MG tablet Take 1 tablet (5 mg) by mouth daily 90 tablet 3     Melatonin 10 MG TBCR Take 1 tablet by mouth nightly as needed       metoprolol succinate ER (TOPROL-XL) 100 MG 24 hr tablet TAKE 1 TABLET(100 MG) BY MOUTH DAILY. MAY TAKE ANOTHER DOSE AFTER 4 HOURS IF HEART RATE REMAINS GREATER THAN 120 DAILY 90 tablet 3     Multiple Vitamins-Minerals (HAIR SKIN AND NAILS FORMULA PO) Take 3 tablets by mouth daily       order for DME Equipment being ordered: pulse oximeter 1 Device 0     rivaroxaban ANTICOAGULANT (XARELTO ANTICOAGULANT) 20 MG TABS tablet TAKE 1 TABLET(20 MG) BY MOUTH DAILY WITH DINNER 90 tablet 3         Allergies   Allergen Reactions     Amoxicillin Anaphylaxis     Codeine        Social History:  Social History     Tobacco Use     Smoking status: Current Some Day Smoker     Packs/day: 0.50     Types: Cigarettes     Smokeless tobacco: Never Used   Substance Use Topics     Alcohol use: Yes     Alcohol/week: 1.0 standard drinks     Types: 1 Glasses of wine per week     Comment: Daily     Lives at home with her family    Family History:   The patient's family history is notable for   Family History   Problem Relation Age of Onset     Diabetes Mother      Hypertension Mother      Ovarian Cancer Mother 40     Arrhythmia Father      Cerebrovascular Disease Father 65        multiple strokes     Myocardial  Infarction Father 60     Arrhythmia Brother      Breast Cancer Other      No Known Problems Maternal Grandmother      No Known Problems Maternal Grandfather      No Known Problems Paternal Grandmother      No Known Problems Paternal Grandfather      No Known Problems Sister      No Known Problems Son      No Known Problems Daughter      No Known Problems Maternal Half-Brother      No Known Problems Maternal Half-Sister      No Known Problems Paternal Half-Brother      No Known Problems Paternal Half-Sister      No Known Problems Niece      No Known Problems Nephew      No Known Problems Cousin          Physical Exam:   /81   Pulse 84   Temp 98.9  F (37.2  C) (Oral)   Resp 18   Wt 118.8 kg (262 lb)   SpO2 98%   BMI 36.54 kg/m     Gen: alert and o x 3 in NAD  Psych: mood stable, communicative       Assessment: Michelle Shetty is a 60 year old woman with a  diagnosis of atypical endometrial hyperplasia arising in a poly in 10/202. She declined definitve management with hysterectomy and had Mirena IUD placed in 12/2020 as medical management    I discussed the approach for office endometrial biopsy, risks and benefits. Today she declines to undergo the procedure, concerned there will be pain and bleeding after the procedure. She is willing to undergo pelvic US and possible endometrial biopsy in the future. She will schedule this in few months.    Current smoker    Uterus with multiple fibroids    On anticoagulation      Plan:     1.)   Endometrial biopsy canceled today    2.)   Follow up after pelvic US and to continue discussion on surgery versus continued use of Mirena IUD      Rossana Aguirre M.D., MPH,  F.A.C.O.G.  Professor  Department of Ob/Gyn and Women's Health  Division of Gynecologic Oncology  [facility] [place]  [phone]      Time spent with patient 25 minutes and more than 50 % of the time was spent reviewing data, counseling the patient, discussing treatment options and  coordination of care.        Again, thank you for allowing me to participate in the care of your patient.        Sincerely,        Rossana Aguirre MD

## 2021-03-04 NOTE — PROGRESS NOTES
GYNECOLOGIC  ONCOLOGY CLINIC NOTE    Referring provider:    Stella Cagle MD  [address] [facility] [address]  [place] [address]   RE: Michelle Shetty  : 1960  HANG: 3/4/2021      CC: Atypical Endometrial Hyperplasia    HPI: Ms Michelle Shetty is a 60 year old female who presents for management of  atypical endometrial hyperplasia.    Today she presents for the scheduled endometrial biopsy, three months after Mirena IUD placement. She reports she is doing well. She is not ready for surgery and feels the Mirena IUD must be working, her bleeding has decreased.    History to date:  She reports family history of her mother who  after undergoing hysterectomy for some cancer, maybe ovarian cancer. Patient has a lot of concerns about being able to safely undergo surgery.     She has onset of post menopausal bleeding starting in 2020.  Minimal current bleeding. She is currently on anticoagulation for history of atrial flutter.    Today she reports having discussed the need for a hysterectomy with her family, and at this time due to the ongoing COVID she would prefer to defer any surgery.     Patient is referred by Dr. Cagle with the below noted work up completed.    20 Pelvic US 13.3 x 6.2 8.2 cm, 3 fibroids 3.8, 4.8 , 1.4 cm    10/20/2020 Endometrial Biopsy: Endometrial polyp with rare focus of atypical endometrial hyperplasia  12/10/2021 Mirena IUD placement    Medical History is notable for  Atrial fibrillation-rate controled on beta-blocker and is on Xarelto  Chronic Systolic Health Failure: Echo 2017  EF 55%     OBGYN history and Health Maintenance:    Last Pap Smear: 10/2020, NILM, HPV Negative  Last Mammogram:  None recent  Last Colonoscopy:  None recent    Review of Systems:  12 point review of system with pertinent positive as noted above    Past Medical History:   Diagnosis Date     Anemia      Atrial flutter (H)      Diastolic heart failure (H)        Past Surgical History:    Procedure Laterality Date     BREAST SURGERY      benign tumor left breast     CHOLECYSTECTOMY       H LAP ABLAT UTERINE FIBROIDS W/INTRAOP US GUIDE            Current Outpatient Medications   Medication Sig Dispense Refill     furosemide (LASIX) 40 MG tablet Take 1 tablet (40 mg) by mouth daily as needed 90 tablet 3     lisinopril (ZESTRIL) 5 MG tablet Take 1 tablet (5 mg) by mouth daily 90 tablet 3     Melatonin 10 MG TBCR Take 1 tablet by mouth nightly as needed       metoprolol succinate ER (TOPROL-XL) 100 MG 24 hr tablet TAKE 1 TABLET(100 MG) BY MOUTH DAILY. MAY TAKE ANOTHER DOSE AFTER 4 HOURS IF HEART RATE REMAINS GREATER THAN 120 DAILY 90 tablet 3     Multiple Vitamins-Minerals (HAIR SKIN AND NAILS FORMULA PO) Take 3 tablets by mouth daily       order for DME Equipment being ordered: pulse oximeter 1 Device 0     rivaroxaban ANTICOAGULANT (XARELTO ANTICOAGULANT) 20 MG TABS tablet TAKE 1 TABLET(20 MG) BY MOUTH DAILY WITH DINNER 90 tablet 3         Allergies   Allergen Reactions     Amoxicillin Anaphylaxis     Codeine        Social History:  Social History     Tobacco Use     Smoking status: Current Some Day Smoker     Packs/day: 0.50     Types: Cigarettes     Smokeless tobacco: Never Used   Substance Use Topics     Alcohol use: Yes     Alcohol/week: 1.0 standard drinks     Types: 1 Glasses of wine per week     Comment: Daily     Lives at home with her family    Family History:   The patient's family history is notable for   Family History   Problem Relation Age of Onset     Diabetes Mother      Hypertension Mother      Ovarian Cancer Mother 40     Arrhythmia Father      Cerebrovascular Disease Father 65        multiple strokes     Myocardial Infarction Father 60     Arrhythmia Brother      Breast Cancer Other      No Known Problems Maternal Grandmother      No Known Problems Maternal Grandfather      No Known Problems Paternal Grandmother      No Known Problems Paternal Grandfather      No Known Problems  Sister      No Known Problems Son      No Known Problems Daughter      No Known Problems Maternal Half-Brother      No Known Problems Maternal Half-Sister      No Known Problems Paternal Half-Brother      No Known Problems Paternal Half-Sister      No Known Problems Niece      No Known Problems Nephew      No Known Problems Cousin          Physical Exam:   /81   Pulse 84   Temp 98.9  F (37.2  C) (Oral)   Resp 18   Wt 118.8 kg (262 lb)   SpO2 98%   BMI 36.54 kg/m     Gen: alert and o x 3 in NAD  Psych: mood stable, communicative       Assessment: Michelle Shetty is a 60 year old woman with a  diagnosis of atypical endometrial hyperplasia arising in a poly in 10/202. She declined definitve management with hysterectomy and had Mirena IUD placed in 12/2020 as medical management    I discussed the approach for office endometrial biopsy, risks and benefits. Today she declines to undergo the procedure, concerned there will be pain and bleeding after the procedure. She is willing to undergo pelvic US and possible endometrial biopsy in the future. She will schedule this in few months.    Current smoker    Uterus with multiple fibroids    On anticoagulation      Plan:     1.)   Endometrial biopsy canceled today    2.)   Follow up after pelvic US and to continue discussion on surgery versus continued use of Mirena IUD      Rossana Aguirre M.D., MPH,  F.A.C.O.G.  Professor  Department of Ob/Gyn and Women's Health  Division of Gynecologic Oncology  [facility]  [phone]      Time spent with patient 25 minutes and more than 50 % of the time was spent reviewing data, counseling the patient, discussing treatment options and coordination of care.

## 2021-04-09 ENCOUNTER — ANCILLARY PROCEDURE (OUTPATIENT)
Dept: ULTRASOUND IMAGING | Facility: CLINIC | Age: 61
End: 2021-04-09
Attending: OBSTETRICS & GYNECOLOGY
Payer: COMMERCIAL

## 2021-04-09 DIAGNOSIS — N85.02 ENDOMETRIAL HYPERPLASIA WITH ATYPIA: ICD-10-CM

## 2021-04-09 PROCEDURE — 76830 TRANSVAGINAL US NON-OB: CPT | Mod: GC | Performed by: RADIOLOGY

## 2021-04-09 PROCEDURE — 76856 US EXAM PELVIC COMPLETE: CPT | Mod: GC | Performed by: RADIOLOGY

## 2021-04-15 ENCOUNTER — TELEPHONE (OUTPATIENT)
Dept: ONCOLOGY | Facility: CLINIC | Age: 61
End: 2021-04-15

## 2021-04-15 NOTE — TELEPHONE ENCOUNTER
LVM per Dr. Aguirre. She will need to see patients earlier in the day on Monday 4/19. Please ask if patient can have her appointment at either 10:00am or 10:30am ( in the new patient slot).

## 2021-04-19 ENCOUNTER — VIRTUAL VISIT (OUTPATIENT)
Dept: ONCOLOGY | Facility: CLINIC | Age: 61
End: 2021-04-19
Attending: OBSTETRICS & GYNECOLOGY
Payer: COMMERCIAL

## 2021-04-19 VITALS — BODY MASS INDEX: 36.54 KG/M2 | WEIGHT: 262 LBS

## 2021-04-19 DIAGNOSIS — E66.01 MORBID OBESITY (H): ICD-10-CM

## 2021-04-19 DIAGNOSIS — N85.02 ENDOMETRIAL HYPERPLASIA WITH ATYPIA: Primary | ICD-10-CM

## 2021-04-19 PROCEDURE — 99213 OFFICE O/P EST LOW 20 MIN: CPT | Mod: GT | Performed by: OBSTETRICS & GYNECOLOGY

## 2021-04-19 ASSESSMENT — PAIN SCALES - GENERAL: PAINLEVEL: NO PAIN (0)

## 2021-04-19 NOTE — PROGRESS NOTES
GYNECOLOGIC  ONCOLOGY CLINIC NOTE    Referring provider:    Stella Cagle MD  [address] [facility] [address]  [place] [address]   RE: Michelle Shetty  : 1960  HANG: 2021    CC: Atypical Endometrial Hyperplasia    HPI: Ms Michelle Shetty is a 60 year old female who presents for management of atypical endometrial hyperplasia.    Today she reports feeling well. Her  has an upcoming knee surgery and she will not be ready for her own surgery until August. She completed the pelvic US. She does not want to do an endometrial biopsy at this time because she bleed so much after the last biopsy.     History to date:    She reports family history of her mother who  after undergoing hysterectomy for some cancer, maybe ovarian cancer. Patient has a lot of concerns about being able to safely undergo surgery.     She has onset of post menopausal bleeding starting in 2020.  Minimal current bleeding. She is currently on anticoagulation for history of atrial flutter.    Today she reports having discussed the need for a hysterectomy with her family, and at this time due to the ongoing COVID she would prefer to defer any surgery.     Patient is referred by Dr. Cagle with the below noted work up completed.    20 Pelvic US 13.3 x 6.2 8.2 cm, 3 fibroids 3.8, 4.8 , 1.4 cm, endometrial stripe 11mm    10/20/2020 Endometrial Biopsy: Endometrial polyp with rare focus of atypical endometrial hyperplasia    12/10/2020 Mirena IUD placed    Medical History is notable for  Atrial fibrillation-rate controled on beta-blocker and is on Xarelto  Chronic Systolic Health Failure: Echo 2017  EF 55%     21 Pelvic US  IMPRESSION:   1. Borderline elevated endometrial thickness measuring 5 mm.  2. Focal heterogeneous fibroid in the posterior body of uterus  measuring up to 2.9 cm.  3. Appropriate positioning of intrauterine device.  4. Nonvisualization of the left ovary.    OBGYN history and Health  Maintenance:    Last Pap Smear: 10/2020, NILM, HPV Negative  Last Mammogram:  None recent  Last Colonoscopy:  None recent    Review of Systems:  12 point review of system with pertinent positive as noted above    Past Medical History:   Diagnosis Date     Anemia      Atrial flutter (H)      Diastolic heart failure (H)        Past Surgical History:   Procedure Laterality Date     BREAST SURGERY      benign tumor left breast     CHOLECYSTECTOMY       H LAP ABLAT UTERINE FIBROIDS W/INTRAOP US GUIDE            Current Outpatient Medications   Medication Sig Dispense Refill     furosemide (LASIX) 40 MG tablet Take 1 tablet (40 mg) by mouth daily as needed 90 tablet 3     lisinopril (ZESTRIL) 5 MG tablet Take 1 tablet (5 mg) by mouth daily 90 tablet 3     Melatonin 10 MG TBCR Take 1 tablet by mouth nightly as needed       metoprolol succinate ER (TOPROL-XL) 100 MG 24 hr tablet TAKE 1 TABLET(100 MG) BY MOUTH DAILY. MAY TAKE ANOTHER DOSE AFTER 4 HOURS IF HEART RATE REMAINS GREATER THAN 120 DAILY 90 tablet 3     Multiple Vitamins-Minerals (HAIR SKIN AND NAILS FORMULA PO) Take 3 tablets by mouth daily       order for DME Equipment being ordered: pulse oximeter 1 Device 0     rivaroxaban ANTICOAGULANT (XARELTO ANTICOAGULANT) 20 MG TABS tablet TAKE 1 TABLET(20 MG) BY MOUTH DAILY WITH DINNER 90 tablet 3         Allergies   Allergen Reactions     Amoxicillin Anaphylaxis     Codeine        Social History:  Social History     Tobacco Use     Smoking status: Current Some Day Smoker     Packs/day: 0.50     Types: Cigarettes     Smokeless tobacco: Never Used   Substance Use Topics     Alcohol use: Yes     Alcohol/week: 1.0 standard drinks     Types: 1 Glasses of wine per week     Comment: Daily     Lives at home with her family    Family History:   The patient's family history is notable for   Family History   Problem Relation Age of Onset     Diabetes Mother      Hypertension Mother      Ovarian Cancer Mother 40     Arrhythmia  Father      Cerebrovascular Disease Father 65        multiple strokes     Myocardial Infarction Father 60     Arrhythmia Brother      Breast Cancer Other      No Known Problems Maternal Grandmother      No Known Problems Maternal Grandfather      No Known Problems Paternal Grandmother      No Known Problems Paternal Grandfather      No Known Problems Sister      No Known Problems Son      No Known Problems Daughter      No Known Problems Maternal Half-Brother      No Known Problems Maternal Half-Sister      No Known Problems Paternal Half-Brother      No Known Problems Paternal Half-Sister      No Known Problems Niece      No Known Problems Nephew      No Known Problems Cousin          Physical Exam:   Wt 118.8 kg (262 lb)   BMI 36.54 kg/m     Gen: alert and o x 3 in NAD  Virtual visit, communicative       Assessment: Michelle Shetty is a 60 year old woman with history of atypical endometrial hyperplasia arising in a polyp diagnosed in 10/2020. She has been reluctant to undergo surgery due to family responsibilities and concern about morbidity of surgery.    She currently is under treatment with Mirena IUD, reports no vaginal bleeding.     I reviewed the recent Pelvic US and discussed the results. There is a documented thinning of the endometrial stripe which is good. I do recommend an endometrial biopsy to confirm response. At this time she declines but is willing to come in sometime in June for the biopsy. She is considering undergoing a more definitive treatment with hysterectomy in August of 2021.    Current smoker    Uterus with multiple fibroids    On anticoagulation      Plan:     1.)   Follow up in June 2021 for endometrial biopsy. Patient to take 600 mg Motrin ahead of the clinic visit.      Rossana Aguirre M.D., MPH,  F.A.C.O.G.  Professor  Department of Ob/Gyn and Women's Health  Division of Gynecologic Oncology  [facility]  [phone]      Time spent with patient 20  minutes and more than 50 % of the time was spent reviewing data, counseling the patient, discussing treatment options and coordination of care.

## 2021-04-19 NOTE — LETTER
2021         RE: Michelle Shetty  [address]  [facility] [address]        Dear Colleague,    Thank you for referring your patient, Michelle Shetty, to the [facility]. Please see a copy of my visit note below.    GYNECOLOGIC  ONCOLOGY CLINIC NOTE    Referring provider:    Stella Cagle MD  [address]  [place] [address]   RE: Michelle Shetty  : 1960  HANG: 2021    CC: Atypical Endometrial Hyperplasia    HPI: Ms Michelle Shetty is a 60 year old female who presents for management of atypical endometrial hyperplasia.    Today she reports feeling well. Her  has an upcoming knee surgery and she will not be ready for her own surgery until August. She completed the pelvic US. She does not want to do an endometrial biopsy at this time because she bleed so much after the last biopsy.     History to date:    She reports family history of her mother who  after undergoing hysterectomy for some cancer, maybe ovarian cancer. Patient has a lot of concerns about being able to safely undergo surgery.     She has onset of post menopausal bleeding starting in 2020.  Minimal current bleeding. She is currently on anticoagulation for history of atrial flutter.    Today she reports having discussed the need for a hysterectomy with her family, and at this time due to the ongoing COVID she would prefer to defer any surgery.     Patient is referred by Dr. Cagle with the below noted work up completed.    20 Pelvic US 13.3 x 6.2 8.2 cm, 3 fibroids 3.8, 4.8 , 1.4 cm, endometrial stripe 11mm    10/20/2020 Endometrial Biopsy: Endometrial polyp with rare focus of atypical endometrial hyperplasia    Medical History is notable for  Atrial fibrillation-rate controled on beta-blocker and is on Xarelto  Chronic Systolic Health Failure: Echo 2017  EF 55%     21 Pelvic US  IMPRESSION:   1. Borderline elevated endometrial thickness  measuring 5 mm.  2. Focal heterogeneous fibroid in the posterior body of uterus  measuring up to 2.9 cm.  3. Appropriate positioning of intrauterine device.  4. Nonvisualization of the left ovary.    OBGYN history and Health Maintenance:    Last Pap Smear: 10/2020, NILM, HPV Negative  Last Mammogram:  None recent  Last Colonoscopy:  None recent    Review of Systems:  12 point review of system with pertinent positive as noted above    Past Medical History:   Diagnosis Date     Anemia      Atrial flutter (H)      Diastolic heart failure (H)        Past Surgical History:   Procedure Laterality Date     BREAST SURGERY      benign tumor left breast     CHOLECYSTECTOMY       H LAP ABLAT UTERINE FIBROIDS W/INTRAOP US GUIDE            Current Outpatient Medications   Medication Sig Dispense Refill     furosemide (LASIX) 40 MG tablet Take 1 tablet (40 mg) by mouth daily as needed 90 tablet 3     lisinopril (ZESTRIL) 5 MG tablet Take 1 tablet (5 mg) by mouth daily 90 tablet 3     Melatonin 10 MG TBCR Take 1 tablet by mouth nightly as needed       metoprolol succinate ER (TOPROL-XL) 100 MG 24 hr tablet TAKE 1 TABLET(100 MG) BY MOUTH DAILY. MAY TAKE ANOTHER DOSE AFTER 4 HOURS IF HEART RATE REMAINS GREATER THAN 120 DAILY 90 tablet 3     Multiple Vitamins-Minerals (HAIR SKIN AND NAILS FORMULA PO) Take 3 tablets by mouth daily       order for DME Equipment being ordered: pulse oximeter 1 Device 0     rivaroxaban ANTICOAGULANT (XARELTO ANTICOAGULANT) 20 MG TABS tablet TAKE 1 TABLET(20 MG) BY MOUTH DAILY WITH DINNER 90 tablet 3         Allergies   Allergen Reactions     Amoxicillin Anaphylaxis     Codeine        Social History:  Social History     Tobacco Use     Smoking status: Current Some Day Smoker     Packs/day: 0.50     Types: Cigarettes     Smokeless tobacco: Never Used   Substance Use Topics     Alcohol use: Yes     Alcohol/week: 1.0 standard drinks     Types: 1 Glasses of wine per week     Comment: Daily     Lives at  home with her family    Family History:   The patient's family history is notable for   Family History   Problem Relation Age of Onset     Diabetes Mother      Hypertension Mother      Ovarian Cancer Mother 40     Arrhythmia Father      Cerebrovascular Disease Father 65        multiple strokes     Myocardial Infarction Father 60     Arrhythmia Brother      Breast Cancer Other      No Known Problems Maternal Grandmother      No Known Problems Maternal Grandfather      No Known Problems Paternal Grandmother      No Known Problems Paternal Grandfather      No Known Problems Sister      No Known Problems Son      No Known Problems Daughter      No Known Problems Maternal Half-Brother      No Known Problems Maternal Half-Sister      No Known Problems Paternal Half-Brother      No Known Problems Paternal Half-Sister      No Known Problems Niece      No Known Problems Nephew      No Known Problems Cousin          Physical Exam:   Wt 118.8 kg (262 lb)   BMI 36.54 kg/m     Gen: alert and o x 3 in NAD  Virtual visit, communicative       Assessment: Michelle Shetty is a 60 year old woman with history of atypical endometrial hyperplasia arising in a polyp diagnosed in 10/2020. She has been reluctant to undergo surgery due to family responsibilities and concern about morbidity of surgery.    She currently is under treatment with Mirena IUD, reports no vaginal bleeding.     I reviewed the recent Pelvic US and discussed the results. There is a documented thinning of the endometrial stripe which is good. I do recommend an endometrial biopsy to confirm response. At this time she declines but is willing to come in sometime in June for the biopsy. She is considering undergoing a more definitive treatment with hysterectomy in August of 2021.    Current smoker    Uterus with multiple fibroids    On anticoagulation      Plan:     1.)   Follow up in June 2021 for endometrial biopsy. Patient to take 600 mg Motrin ahead of the clinic  visit.      Rossana Aguirre M.D., MPH,  F.A.C.OAlekseyG.  Professor  Department of Ob/Gyn and Women's Health  Division of Gynecologic Oncology  [facility] [place]  [phone]      Time spent with patient 20 minutes and more than 50 % of the time was spent reviewing data, counseling the patient, discussing treatment options and coordination of care.      Michelle is a 60 year old who is being evaluated via a billable video visit.      How would you like to obtain your AVS? MyChart  If the video visit is dropped, the invitation should be resent by: Text to cell phone: [phone]  Will anyone else be joining your video visit? No      Video Start Time:   Video-Visit Details    Type of service:  Video Visit    Video End Time:   Originating Location (pt. Location): Home    Distant Location (provider location):  [facility]BI     Platform used for Video Visit: Antoinette Parra CMA        Again, thank you for allowing me to participate in the care of your patient.        Sincerely,        Rossana Aguirre MD

## 2021-04-19 NOTE — LETTER
2021         RE: Michelle Shetty  [address]  [facility] [address]        Dear Colleague,    Thank you for referring your patient, Michelle Shetty, to the [facility]. Please see a copy of my visit note below.    GYNECOLOGIC  ONCOLOGY CLINIC NOTE    Referring provider:    Stella Cagle MD  [address]  [place] [address]   RE: Michelle Shetty  : 1960  HANG: 2021    CC: Atypical Endometrial Hyperplasia    HPI: Ms Michelle Shetty is a 60 year old female who presents for management of atypical endometrial hyperplasia.    Today she reports feeling well. Her  has an upcoming knee surgery and she will not be ready for her own surgery until August. She completed the pelvic US. She does not want to do an endometrial biopsy at this time because she bleed so much after the last biopsy.     History to date:    She reports family history of her mother who  after undergoing hysterectomy for some cancer, maybe ovarian cancer. Patient has a lot of concerns about being able to safely undergo surgery.     She has onset of post menopausal bleeding starting in 2020.  Minimal current bleeding. She is currently on anticoagulation for history of atrial flutter.    Today she reports having discussed the need for a hysterectomy with her family, and at this time due to the ongoing COVID she would prefer to defer any surgery.     Patient is referred by Dr. Cagle with the below noted work up completed.    20 Pelvic US 13.3 x 6.2 8.2 cm, 3 fibroids 3.8, 4.8 , 1.4 cm, endometrial stripe 11mm    10/20/2020 Endometrial Biopsy: Endometrial polyp with rare focus of atypical endometrial hyperplasia    Medical History is notable for  Atrial fibrillation-rate controled on beta-blocker and is on Xarelto  Chronic Systolic Health Failure: Echo 2017  EF 55%     21 Pelvic US  IMPRESSION:   1. Borderline elevated endometrial thickness  measuring 5 mm.  2. Focal heterogeneous fibroid in the posterior body of uterus  measuring up to 2.9 cm.  3. Appropriate positioning of intrauterine device.  4. Nonvisualization of the left ovary.    OBGYN history and Health Maintenance:    Last Pap Smear: 10/2020, NILM, HPV Negative  Last Mammogram:  None recent  Last Colonoscopy:  None recent    Review of Systems:  12 point review of system with pertinent positive as noted above    Past Medical History:   Diagnosis Date     Anemia      Atrial flutter (H)      Diastolic heart failure (H)        Past Surgical History:   Procedure Laterality Date     BREAST SURGERY      benign tumor left breast     CHOLECYSTECTOMY       H LAP ABLAT UTERINE FIBROIDS W/INTRAOP US GUIDE            Current Outpatient Medications   Medication Sig Dispense Refill     furosemide (LASIX) 40 MG tablet Take 1 tablet (40 mg) by mouth daily as needed 90 tablet 3     lisinopril (ZESTRIL) 5 MG tablet Take 1 tablet (5 mg) by mouth daily 90 tablet 3     Melatonin 10 MG TBCR Take 1 tablet by mouth nightly as needed       metoprolol succinate ER (TOPROL-XL) 100 MG 24 hr tablet TAKE 1 TABLET(100 MG) BY MOUTH DAILY. MAY TAKE ANOTHER DOSE AFTER 4 HOURS IF HEART RATE REMAINS GREATER THAN 120 DAILY 90 tablet 3     Multiple Vitamins-Minerals (HAIR SKIN AND NAILS FORMULA PO) Take 3 tablets by mouth daily       order for DME Equipment being ordered: pulse oximeter 1 Device 0     rivaroxaban ANTICOAGULANT (XARELTO ANTICOAGULANT) 20 MG TABS tablet TAKE 1 TABLET(20 MG) BY MOUTH DAILY WITH DINNER 90 tablet 3         Allergies   Allergen Reactions     Amoxicillin Anaphylaxis     Codeine        Social History:  Social History     Tobacco Use     Smoking status: Current Some Day Smoker     Packs/day: 0.50     Types: Cigarettes     Smokeless tobacco: Never Used   Substance Use Topics     Alcohol use: Yes     Alcohol/week: 1.0 standard drinks     Types: 1 Glasses of wine per week     Comment: Daily     Lives at  home with her family    Family History:   The patient's family history is notable for   Family History   Problem Relation Age of Onset     Diabetes Mother      Hypertension Mother      Ovarian Cancer Mother 40     Arrhythmia Father      Cerebrovascular Disease Father 65        multiple strokes     Myocardial Infarction Father 60     Arrhythmia Brother      Breast Cancer Other      No Known Problems Maternal Grandmother      No Known Problems Maternal Grandfather      No Known Problems Paternal Grandmother      No Known Problems Paternal Grandfather      No Known Problems Sister      No Known Problems Son      No Known Problems Daughter      No Known Problems Maternal Half-Brother      No Known Problems Maternal Half-Sister      No Known Problems Paternal Half-Brother      No Known Problems Paternal Half-Sister      No Known Problems Niece      No Known Problems Nephew      No Known Problems Cousin          Physical Exam:   Wt 118.8 kg (262 lb)   BMI 36.54 kg/m     Gen: alert and o x 3 in NAD  Virtual visit, communicative       Assessment: Michelle Shetty is a 60 year old woman with history of atypical endometrial hyperplasia arising in a polyp diagnosed in 10/2020. She has been reluctant to undergo surgery due to family responsibilities and concern about morbidity of surgery.    She currently is under treatment with Mirena IUD, reports no vaginal bleeding.     I reviewed the recent Pelvic US and discussed the results. There is a documented thinning of the endometrial stripe which is good. I do recommend an endometrial biopsy to confirm response. At this time she declines but is willing to come in sometime in June for the biopsy. She is considering undergoing a more definitive treatment with hysterectomy in August of 2021.    Current smoker    Uterus with multiple fibroids    On anticoagulation      Plan:     1.)   Follow up in June 2021 for endometrial biopsy. Patient to take 600 mg Motrin ahead of the clinic  visit.      Rossana Aguirre M.D., MPH,  F.A.C.OAlekseyG.  Professor  Department of Ob/Gyn and Women's Health  Division of Gynecologic Oncology  [facility] [place]  [phone]      Time spent with patient 20 minutes and more than 50 % of the time was spent reviewing data, counseling the patient, discussing treatment options and coordination of care.      Michelle is a 60 year old who is being evaluated via a billable video visit.      How would you like to obtain your AVS? MyChart  If the video visit is dropped, the invitation should be resent by: Text to cell phone: [phone]  Will anyone else be joining your video visit? No      Video Start Time:   Video-Visit Details    Type of service:  Video Visit    Video End Time:   Originating Location (pt. Location): Home    Distant Location (provider location):  [facility]BI     Platform used for Video Visit: Antoinette Parra CMA        Again, thank you for allowing me to participate in the care of your patient.        Sincerely,        Rossana Aguirre MD

## 2021-04-27 DIAGNOSIS — I50.22 CHRONIC SYSTOLIC CONGESTIVE HEART FAILURE (H): ICD-10-CM

## 2021-04-27 DIAGNOSIS — I48.20 CHRONIC ATRIAL FIBRILLATION (H): ICD-10-CM

## 2021-04-27 RX ORDER — LISINOPRIL 5 MG/1
TABLET ORAL
Qty: 90 TABLET | Refills: 3 | Status: SHIPPED | OUTPATIENT
Start: 2021-04-27 | End: 2022-04-24

## 2021-04-27 RX ORDER — RIVAROXABAN 20 MG/1
TABLET, FILM COATED ORAL
Qty: 90 TABLET | Refills: 3 | Status: SHIPPED | OUTPATIENT
Start: 2021-04-27 | End: 2022-04-24

## 2021-05-04 ENCOUNTER — TELEPHONE (OUTPATIENT)
Dept: ONCOLOGY | Facility: CLINIC | Age: 61
End: 2021-05-04

## 2021-05-04 NOTE — TELEPHONE ENCOUNTER
Spoke to patient and she declines to schedule the follow up, in person visit in June, 2021 for endometrial biopsy with Dr. Aguirre . Routed message to Luis M MCKEON

## 2021-05-07 ENCOUNTER — TELEPHONE (OUTPATIENT)
Dept: ONCOLOGY | Facility: CLINIC | Age: 61
End: 2021-05-07

## 2021-05-07 ENCOUNTER — PATIENT OUTREACH (OUTPATIENT)
Dept: ONCOLOGY | Facility: CLINIC | Age: 61
End: 2021-05-07

## 2021-05-07 NOTE — TELEPHONE ENCOUNTER
Called to schedule pt for in person visit with Dr. Aguirre any day in June.    Pt will call back and schedule. Gave number to call: [phone] opt5, opt2.

## 2021-05-07 NOTE — PROGRESS NOTES
Gyn/Onc team reached out to patient to assist with scheduling.     Patient not ready to schedule follow up with Dr Aguirre.     Patient will reach out to us when the time is right for her.     Amy Handley RN

## 2021-05-14 ENCOUNTER — TELEPHONE (OUTPATIENT)
Dept: FAMILY MEDICINE | Facility: CLINIC | Age: 61
End: 2021-05-14

## 2021-06-01 ENCOUNTER — RECORDS - HEALTHEAST (OUTPATIENT)
Dept: ADMINISTRATIVE | Facility: CLINIC | Age: 61
End: 2021-06-01

## 2021-06-02 ENCOUNTER — RECORDS - HEALTHEAST (OUTPATIENT)
Dept: ADMINISTRATIVE | Facility: CLINIC | Age: 61
End: 2021-06-02

## 2021-09-18 ENCOUNTER — HEALTH MAINTENANCE LETTER (OUTPATIENT)
Age: 61
End: 2021-09-18

## 2021-09-24 DIAGNOSIS — I50.22 CHRONIC SYSTOLIC CONGESTIVE HEART FAILURE (H): ICD-10-CM

## 2021-09-28 RX ORDER — METOPROLOL SUCCINATE 100 MG/1
TABLET, EXTENDED RELEASE ORAL
Qty: 90 TABLET | Refills: 3 | Status: SHIPPED | OUTPATIENT
Start: 2021-09-28 | End: 2022-09-19

## 2021-12-07 ENCOUNTER — OFFICE VISIT (OUTPATIENT)
Dept: FAMILY MEDICINE | Facility: CLINIC | Age: 61
End: 2021-12-07
Payer: COMMERCIAL

## 2021-12-07 VITALS
WEIGHT: 266.4 LBS | DIASTOLIC BLOOD PRESSURE: 87 MMHG | BODY MASS INDEX: 38.14 KG/M2 | HEIGHT: 70 IN | TEMPERATURE: 98.4 F | HEART RATE: 76 BPM | SYSTOLIC BLOOD PRESSURE: 125 MMHG | OXYGEN SATURATION: 96 % | RESPIRATION RATE: 20 BRPM

## 2021-12-07 DIAGNOSIS — I50.22 CHRONIC SYSTOLIC HEART FAILURE (H): ICD-10-CM

## 2021-12-07 DIAGNOSIS — Z23 NEED FOR PROPHYLACTIC VACCINATION AND INOCULATION AGAINST INFLUENZA: ICD-10-CM

## 2021-12-07 DIAGNOSIS — Z23 HIGH PRIORITY FOR 2019-NCOV VACCINE: Primary | ICD-10-CM

## 2021-12-07 DIAGNOSIS — R73.9 HYPERGLYCEMIA: ICD-10-CM

## 2021-12-07 DIAGNOSIS — I48.92 ATRIAL FLUTTER, UNSPECIFIED TYPE (H): ICD-10-CM

## 2021-12-07 DIAGNOSIS — E66.01 MORBID OBESITY (H): ICD-10-CM

## 2021-12-07 DIAGNOSIS — F32.1 CURRENT MODERATE EPISODE OF MAJOR DEPRESSIVE DISORDER WITHOUT PRIOR EPISODE (H): ICD-10-CM

## 2021-12-07 DIAGNOSIS — Z12.31 ENCOUNTER FOR SCREENING MAMMOGRAM FOR BREAST CANCER: ICD-10-CM

## 2021-12-07 DIAGNOSIS — N85.02 ENDOMETRIAL HYPERPLASIA WITH ATYPIA: ICD-10-CM

## 2021-12-07 LAB
ANION GAP SERPL CALCULATED.3IONS-SCNC: 13 MMOL/L (ref 5–18)
BUN SERPL-MCNC: 11 MG/DL (ref 8–22)
CALCIUM SERPL-MCNC: 9.8 MG/DL (ref 8.5–10.5)
CHLORIDE BLD-SCNC: 103 MMOL/L (ref 98–107)
CHOLEST SERPL-MCNC: 187 MG/DL
CO2 SERPL-SCNC: 25 MMOL/L (ref 22–31)
CREAT SERPL-MCNC: 0.92 MG/DL (ref 0.6–1.1)
FASTING STATUS PATIENT QL REPORTED: NORMAL
GFR SERPL CREATININE-BSD FRML MDRD: 67 ML/MIN/1.73M2
GLUCOSE BLD-MCNC: 111 MG/DL (ref 70–125)
HBA1C MFR BLD: 5.7 % (ref 0–5.6)
HDLC SERPL-MCNC: 60 MG/DL
LDLC SERPL CALC-MCNC: 99 MG/DL
POTASSIUM BLD-SCNC: 4 MMOL/L (ref 3.5–5)
SODIUM SERPL-SCNC: 141 MMOL/L (ref 136–145)
TRIGL SERPL-MCNC: 141 MG/DL

## 2021-12-07 PROCEDURE — 0064A COVID-19,PF,MODERNA (18+ YRS BOOSTER .25ML): CPT | Performed by: FAMILY MEDICINE

## 2021-12-07 PROCEDURE — 36415 COLL VENOUS BLD VENIPUNCTURE: CPT | Performed by: FAMILY MEDICINE

## 2021-12-07 PROCEDURE — 90471 IMMUNIZATION ADMIN: CPT | Performed by: FAMILY MEDICINE

## 2021-12-07 PROCEDURE — 80061 LIPID PANEL: CPT | Performed by: FAMILY MEDICINE

## 2021-12-07 PROCEDURE — 80048 BASIC METABOLIC PNL TOTAL CA: CPT | Performed by: FAMILY MEDICINE

## 2021-12-07 PROCEDURE — 90686 IIV4 VACC NO PRSV 0.5 ML IM: CPT | Performed by: FAMILY MEDICINE

## 2021-12-07 PROCEDURE — 83036 HEMOGLOBIN GLYCOSYLATED A1C: CPT | Performed by: FAMILY MEDICINE

## 2021-12-07 PROCEDURE — 91306 COVID-19,PF,MODERNA (18+ YRS BOOSTER .25ML): CPT | Performed by: FAMILY MEDICINE

## 2021-12-07 PROCEDURE — 99214 OFFICE O/P EST MOD 30 MIN: CPT | Mod: 25 | Performed by: FAMILY MEDICINE

## 2021-12-07 RX ORDER — MIRTAZAPINE 15 MG/1
15 TABLET, FILM COATED ORAL AT BEDTIME
Qty: 90 TABLET | Refills: 3 | Status: SHIPPED | OUTPATIENT
Start: 2021-12-07 | End: 2022-11-22

## 2021-12-07 ASSESSMENT — MIFFLIN-ST. JEOR: SCORE: 1845.69

## 2021-12-07 NOTE — PROGRESS NOTES
Social Work Note:    Data and Intervention: SW met with patient during clinic visit to discuss stressors.     Caregiving: Main stressor is caring for 88 year old father who is paralized on his left side. He needs help with all activities of daily living, including toileting and transferring. He is cognitively aware most of the time but he also has some dementia and so he is very mean and bitter towards his caregivers. This is very intensive care both physically and mentally, and it is very draining for her, and it is detrimental to her physical and mental health.   Michelle's dad has lived with patient and her  for 4 years now, since his stroke. Their adult son also lives with them and helps his grandpa at night, Michelle takes the morning and daytime cares. Her dad earned a lot of money in his career and has a good monthly income through [employer], but refuses to pay patient or her family for caring for him. Patient feels bitter about this.     Out of Home Supports/Services: Do not qualify for many services due to earning too much money. Father has a large amount of savings but will not share with patient and her family, and patient has insurance through 's work, though money is tight for them.   They have been trying to place him in an assisted living facility for about 2 years now, but due to COVID precautions, facilities are limiting the number of beds and family would not be able to visit as much as they would like. Her dad is vehemently opposed to going into a care facility, but he opposes most things as his dementia makes him agitated often.    Family: Lives with , father who she cares for, and adult son. Mom passed away 5 years ago and her passing was very hard for the whole family. Has 2 brothers, one passed away suddenly a few years ago at the age of 61 after he fell down his staircase and no one came to check on him for 3 days. The other brother lives out of state and has his own  "stressors caring for his wife who is terminally ill. He calls Michelle's dad several times per week but she really needs him to help with the hands-on caregiving.     Employment/Vocation: This has also caused her to delay her personal goals of employment. She got a graduate degree in  but has been unable to get a job because someone has to take care of her dad.     Mental Health: Patient was able to express her feelings well, and describes feelings of depression as \"a gray veil over everything\", \"just wanting to sleep all the time\", \"finding no lobo even in fun activities\", and \"just not seeing the point in daily life if it involves caring for my dad.\" SW asked for more details on this last phrase, patient does not have any active suicidal ideation, but does express a sense of hopelessness. Discussed seeing a therapist, but patient declined, stating it would just be another thing she would have to leave the house to do and finding someone to care for her dad while she went to therapy would be a hassle.   Explained caregiver fatigue and how this is common, and offered support groups for caregivers. [facility] has some of these that meet weekly in person and virtually. Patient showed mild interest in these and took the resources.     Assessment and Plan: Provided information for caregiver support groups. Will follow up as needs arise.     Mary Stark, DARRIAN   "

## 2021-12-07 NOTE — PROGRESS NOTES
"Assessment & Plan     High priority for 2019-nCoV vaccine    - COVID-19,PF,MODERNA (18+ Yrs BOOSTER .25mL)    Need for prophylactic vaccination and inoculation against influenza    - INFLUENZA VACCINE IM > 6 MONTHS VALENT IIV4 (AFLURIA/FLUZONE)    Morbid obesity (H)      Atrial flutter, unspecified type (H)  Recheck labs today.  - Basic metabolic panel; Future  - Lipid Panel; Future  - Lipid Panel  - Basic metabolic panel    Chronic systolic heart failure (H)  Her EF has been preserved on combination of lisinopril and metoprolol.  However, she is having worsening dyspnea with exertion so I think we need to repeat an echo which has not been done for 4 years.  If that does not explain her symptomatology we would need to send her for a stress test.    Endometrial hyperplasia with atypia  She is following with [facility] gynecologic oncology clinic for this.    Encounter for screening mammogram for breast cancer    - MA SCREENING DIGITAL BILAT; Future    Hyperglycemia    - Hemoglobin A1c; Future  - Hemoglobin A1c    Current moderate episode of major depressive disorder without prior episode (H)    - mirtazapine (REMERON) 15 MG tablet; Take 1 tablet (15 mg) by mouth At Bedtime      35 minutes spent on the date of the encounter doing chart review, patient visit, documentation and discussion with other provider(s)        Tobacco Cessation:   reports that she has been smoking cigarettes. She has been smoking about 0.50 packs per day. She has never used smokeless tobacco.    BMI:   Estimated body mass index is 38.78 kg/m  as calculated from the following:    Height as of this encounter: 1.765 m (5' 9.5\").    Weight as of this encounter: 120.8 kg (266 lb 6.4 oz).   Weight management plan: Discussed healthy diet and exercise guidelines    Depression Screening Follow Up    PHQ 12/7/2021   PHQ-9 Total Score 18   Q9: Thoughts of better off dead/self-harm past 2 weeks Not at all         Follow Up Actions Taken  Crisis " "resource information provided in After Visit Summary  Medication started for depression.  She will follow-up with me, her PCP, in 1 month.     Ari Cruz MD  [facility]    Options for treatment and/or follow-up care were reviewed with the patient. Michelle Shetty was engaged and actively involved in the decision making process. She verbalized understanding of the options discussed and was satisfied with the final plan.    RTC in 1 month for follow up or sooner if develops new or worsening symptoms.    Subjective: Michelle Shetty is a 61 year old following up on a few concerns.  She has permanent atrial fibrillation.  She is on Xarelto for this and is rate controlled with metoprolol.  She has not had any trips to the ED for this.  She does describe worsening dyspnea with exertion over the last 1 to 2 years.  She is not had an echocardiogram in 4 years.    She has had previously elevated blood sugars and has not had laboratory testing for diabetes in over a year.  She has a family history of diabetes.    She is having worsening mood symptoms of depression.  She also describes significant insomnia and says \"I have had for nights of good sleep in the last 3 years.\"  Major stressor is her father who is an invalid.  He has had a previous stroke and requires help with toileting, feeding, bathing, etc.  He lives in the patient's home and she provides all his cares during the day.  She also works outside of the home.  There are 2 brothers (sons of the father) but they do not help at all with the cares.  PMHX/PSHX/MEDS/ALLERGIES/SHX/FHX reviewed and updated in Epic.   ROS:   General: No fevers, chills   Head: No headache    GI: No nausea or vomiting.  No constipation or diarrhea.  Objective: /87 (BP Location: Left arm, Patient Position: Sitting, Cuff Size: Adult Regular)   Pulse 76   Temp 98.4  F (36.9  C) (Oral)   Resp 20   Ht 1.765 m (5' 9.5\")   Wt 120.8 kg (266 lb 6.4 oz)   SpO2 " 96%   BMI 38.78 kg/m     Gen: Well nourished and in NAD   CV: Irregularly irregular but no murmurs, rubs, or gallups  Pulm: Clear to auscultation without wheezing or crackles  ABD: soft, nontender, BS intact  Extrem: no cyanosis, edema or clubbing   Psych: Euthymic

## 2021-12-08 ASSESSMENT — PATIENT HEALTH QUESTIONNAIRE - PHQ9: SUM OF ALL RESPONSES TO PHQ QUESTIONS 1-9: 18

## 2022-01-03 DIAGNOSIS — I50.22 CHRONIC SYSTOLIC CONGESTIVE HEART FAILURE (H): ICD-10-CM

## 2022-01-05 RX ORDER — FUROSEMIDE 40 MG
TABLET ORAL
Qty: 90 TABLET | Refills: 3 | Status: SHIPPED | OUTPATIENT
Start: 2022-01-05 | End: 2023-01-10

## 2022-01-08 ENCOUNTER — HEALTH MAINTENANCE LETTER (OUTPATIENT)
Age: 62
End: 2022-01-08

## 2022-04-15 ENCOUNTER — TELEPHONE (OUTPATIENT)
Dept: CARDIOLOGY | Facility: CLINIC | Age: 62
End: 2022-04-15
Payer: COMMERCIAL

## 2022-04-15 PROCEDURE — 99207 PR NO CHARGE NURSE ONLY: CPT

## 2022-04-15 NOTE — TELEPHONE ENCOUNTER
"  Oban Study Pre-Visit Call      Study description:   [employer] PPG Study. The purpose of this research study is to collect data related to health for the development of mobile technologies. This data will include physiological signal recordings from medical devices and data collection software on [employer] Watches (\"study watches\"). This study is not to provide any treatment nor assess safety and effectiveness, but rather to collect information for research and  purposes.     Michelle Shetty a 61 year old female, was called today to discuss participation in the Oban study. The following was reviewed with the patient.       You agree to comply with COVID precautionary measures required by local public health ordinances, workplace health and safety protocols, and/or the Study Team. Such measure may include, for example, wearing a mask, complying with social distancing guidelines, and/ or providing evidence of negative COVID-19 test result Yes       You are fully vaccinated per the most recent CDC guidelines Yes      You do not have any of the following symptoms: fever or chills; difficulty breathing; sustained loss of taste smell, or appetite. Yes      You do not have any of the following unexplained symptoms: fatigue or nausea; whole body muscle aches; new or unexpected headache; sore throat; congestion or runny nose; diarrhea or vomiting Yes      You have not had close contact with someone who is suspected or confirmed to have active COVID-19 in the last 14 days.Yes      Reminders    Please come 10 minutes early for your scheduled appointment time.    Bring your vaccination card with you to your scheduled appointment.     No smoking 2 hours prior to your appointment time.    Wear loose shirt.    Eat before you arrive.       Manjula Washington RN       "

## 2022-04-18 ENCOUNTER — OFFICE VISIT (OUTPATIENT)
Dept: CARDIOLOGY | Facility: CLINIC | Age: 62
End: 2022-04-18
Payer: COMMERCIAL

## 2022-04-18 VITALS
HEART RATE: 78 BPM | BODY MASS INDEX: 39.42 KG/M2 | OXYGEN SATURATION: 98 % | WEIGHT: 275.35 LBS | DIASTOLIC BLOOD PRESSURE: 76 MMHG | HEIGHT: 70 IN | RESPIRATION RATE: 18 BRPM | SYSTOLIC BLOOD PRESSURE: 112 MMHG | TEMPERATURE: 98.2 F

## 2022-04-18 DIAGNOSIS — I48.91 ATRIAL FIBRILLATION, UNSPECIFIED TYPE (H): Primary | ICD-10-CM

## 2022-04-18 PROCEDURE — 93005 ELECTROCARDIOGRAM TRACING: CPT

## 2022-04-18 PROCEDURE — 99207 PR NO CHARGE-RESEARCH SERVICE: CPT

## 2022-04-18 PROCEDURE — 93000 ELECTROCARDIOGRAM COMPLETE: CPT | Performed by: INTERNAL MEDICINE

## 2022-04-18 NOTE — PROGRESS NOTES
"[facility] Study Physical Exam      Medical History Reviewed? Yes    Physical Examination  For abnormal findings, please evaluate if the finding is Clinically Significant (by 'CS') or Not Clinically Significant (by 'NCS')  General Appearance   Normal  Head and Neck   Abnormal; NCS missing several teeth each quadrant  Lungs     Normal  Cardiovascular   Abnormal; NCS irregular rhythm  Abdomen    Normal  Musculoskeletal/Extremities  Normal   Lymph Nodes    Normal  Skin     Normal  Neurological    Normal    Tremor (If present document)  Absent    Vitals:    04/18/22 1340   BP: 112/76   BP Location: Left arm   Pulse: 78   Resp: 18   Temp: 98.2  F (36.8  C)   SpO2: 98%   Weight: 124.9 kg (275 lb 5.7 oz)   Height: 1.778 m (5' 10\")              COVID: No symptoms, chills, shortness of breath, or difficulty breathing, muscle or body aches, headache, loss of taste or smell, sore throat, runny nose, congestion, nausea, vomiting or diarrhea according to the [place] Department of Health and Human Services based on the CARES Act.     COVID Vaccinations:   Immunization History   Administered Date(s) Administered     COVID-19,PF,Elijah 03/09/2021     COVID-19,PF,Moderna Booster 12/07/2021     HepB 12/20/2014     Influenza Vaccine IM > 6 months Valent IIV4 (Alfuria,Fluzone) 12/07/2021     Influenza Vaccine, 6+MO IM (QUADRIVALENT W/PRESERVATIVES) 12/05/2017     Pneumococcal 23 valent 12/05/2017     TD (ADULT, 7+) 03/23/2011     TDAP Vaccine (Boostrix) 01/05/2015     Zoster vaccine, live 09/04/2015       Smoking History  Are you currently smoking or vaping? Yes- last cigarette yesterday, 4/17/2022 at 5 pm  How Many Years Have You Smoked or Vaped? 35 years  Packs or E-Cigs Per Day: 0.5 PPD     Electrocardiogram   12 Lead Interpretation: Atrial Fibrillation   Rhythm Interpretation: 30 second strip: Atrial fibrillation with 4 PVCs; 60 second strip: Atrial fibrillation with 6 PVCs     Respiratory Conditions:   N/A    Spirometer Test Results (FEV%): " n/a  Condition Severity: Not Applicable      Nichole Jarvis PA-C

## 2022-04-18 NOTE — PROGRESS NOTES
" [facility] Study End Note    Study Description:   Multiconditions PPG Study. The purpose of this research study is to collect data related to health for the development of mobile technologies. This data will include physiological signal recordings from medical devices and data collection software on Apple Watches (\"study watches\"). This study is not to provide any treatment nor assess safety and effectiveness, but rather to collect information for research and  purposes.     Adverse Events & Con Med Assessment Performed?   [x]     Did the Subject Complete the Study? Yes    If no, Termination Reason: N/A    Study Termination/Completion Date: 18-APR-2022    Manjula Washington RN    "

## 2022-04-18 NOTE — PROGRESS NOTES
Judie Inclusion/Exclusion Criteria:     Study Name: Judie  : Matthew Stoll MD    Protocol version: 3.0 19-JAN-2022     Criteria #  Inclusion Criterita (ALL MUST BE YES)  YES/NO/N/A   1   Male and female subjects at least 18 years old at the time of the screening visit.  Yes   2   Wrist circumference and 120mm-245mm (inclusive).  Yes   3   Ability to understand and provide written informed consent.  Yes   4   Willing and able to comply with study procedures, activities, and duration as described in the ICF. Yes   5  Documentation provided demonstrating COVID-19 up to date vaccinated status as per the current CDC guidelines.  Yes   6   Didn't smoke at least 2 hours before screening (or study procedures).  Yes   7   Neither subject, nor any individuals living with subject, have had new development in the following within the last 14 days prior to study screening:        a. Have failed to comply with any country, state, and local travel restrictions.         b. Have had any unexpected flu-like symptoms (such as fever, chills, cough, shortness of breath, diarrhea, sore throat, runny nose, or trouble breathing).        c. Have had any contact with people confirmed COVID-19.         d. Have been confirmed to have COVID-19 and have not subsequently received a negative COVID-19 test result.    Yes   8   If Cohort 1 (In-Lab Cardiac Conditions):         a. Indication of a rhythm disorder (dated up to 5 years ago) as outlined in Table A (see Protocol page 9), and be present at the time of screening.     Yes   9   If Cohort 2 (In-Lab Respiratory Conditions):          a. Prior diagnosis of one of the following conditions, within 5 years: 1) Moderate (GOLD Stage 2) COPD, 2) Severe or Very Severe (GOLD Stage 3 or 4) COPD, 3) Idiopathic pulmonary fibrosis.          b. Record of spirometry FEV% result (within 5 years) are available.    NA   10   If Cohort 3 (At-Home respiratory Conditions):         a. Prior  diagnosis of one of the following conditions, within 5 years: 1) Moderate (GOLD Stage 2) COPD, 2) Severe or Very Severe (GOLD Stage 3 or 4) COPD, 3) Idiopathic pulmonary fibrosis.          b. Record of spirometry FEV% result (within 5 years) are available.          c. Willing and able to use a study provided iPhone and navigate study Breonna flow.          D. Stable WIFI at home and are able to connect it to study iPhones   NA       Criteria # Exclusion Criteria (ALL MUST BE NO) YES/NO/N/A   1   Individuals with severe contact allergies to standard adhesives, or other materials found in pulse oximetry sensors, ECG electrodes, respiration monitor electrodes, wearables device bands and watch surfaces.  No   2   Individuals that do not have at least 2 intact fingers (excluding thumb, *pinky will be excluded only for cohort 1 and cohort 2) on non-preferred hand to wear a watch.  No   3   Open wound(s) or active infections on wrists at study watch wear locations or where the ECG electrodes may be placed.  No   4   Physical disability that prevents safe and adequate testing.  No   5   Individuals with a pacemaker or an automated implantable cardioverter-defibrillator (AICD).  No   6   Individuals with physical scars, tattoos, or other skin markings on wrists where sensors or finger sensor are to be worn.  No   7   Individuals with clinically significant hand tremors, as judged by a Study Investigator.  No   8   Pregnant women.     No   9   Subjects with any medical history, physical exam, vital sign or any other study procedure finding/assessment that in the opinion of the Investigator could compromise subject safety during study participation or interfere with the study integrity and/or the accurate assessment of the study objectives.  No   10   Presence of skin conditions or disease at the fingers of SpO2% application sites that could interfere with SpO2% sensor placement or the accuracy of measurement. Such conditions  include, but are not limited to: extensive scarring, skin lesions, redness, infection or edema at target measurement sites.   No   11   Presence of long fingernails that interfere with the placement of the SpO2% sensor or nail polish at the fingers of SpO2% application sites.  No   12   Medical history or physical assessment finding that makes the subject inappropriate for participation, according to the investigator.  No     Patient does fulfill study inclusion criteria and no exclusion criteria are found.     Matthew Stoll MD    18-APR-2022    Manjula Washington RN

## 2022-04-18 NOTE — PROGRESS NOTES
"      [facility] Study In-Lab Note      Study description: Multiconditions PPG Study. The purpose of this research study is to collect data related to health for the development of mobile technologies. This data will include physiological signal recordings from medical devices and data collection software on Apple Watches (\"study watches\"). This study is not to provide any treatment nor assess safety and effectiveness, but rather to collect information for research and  purposes.    Subject ID:  [id number]       SCREENING        Michelle Shetty   1960          61 year old  female    Time Subject Sat: 13:04:00    Past Medical History:   Diagnosis Date     Anemia      Atrial flutter (H)      Diastolic heart failure (H)        Current Outpatient Medications:      furosemide (LASIX) 40 MG tablet, TAKE 1 TABLET(40 MG) BY MOUTH DAILY AS NEEDED, Disp: 90 tablet, Rfl: 3     lisinopril (ZESTRIL) 5 MG tablet, TAKE 1 TABLET(5 MG) BY MOUTH DAILY, Disp: 90 tablet, Rfl: 3     Melatonin 10 MG TBCR, Take 1 tablet by mouth nightly as needed, Disp: , Rfl:      metoprolol succinate ER (TOPROL-XL) 100 MG 24 hr tablet, TAKE 1 TABLET(100 MG) BY MOUTH DAILY. MAY TAKE ANOTHER DOSE AFTER 4 HOURS IF HEART RATE REMAINS GREATER THAN 120 DAILY, Disp: 90 tablet, Rfl: 3     mirtazapine (REMERON) 15 MG tablet, Take 1 tablet (15 mg) by mouth At Bedtime, Disp: 90 tablet, Rfl: 3     Multiple Vitamins-Minerals (HAIR SKIN AND NAILS FORMULA PO), Take 3 tablets by mouth daily, Disp: , Rfl:      order for DME, Equipment being ordered: pulse oximeter, Disp: 1 Device, Rfl: 0     XARELTO ANTICOAGULANT 20 MG TABS tablet, TAKE 1 TABLET(20 MG) BY MOUTH DAILY WITH DINNER, Disp: 90 tablet, Rfl: 3    Allergies   Allergen Reactions     Amoxicillin Anaphylaxis     Codeine         Past Surgical History:   Procedure Laterality Date     APPENDECTOMY       BREAST SURGERY       CARDIOVERSION       CHOLECYSTECTOMY       CHOLECYSTECTOMY       FRACTURE " "SURGERY       H LAP ABLAT UTERINE FIBROIDS W/INTRAOP US GUIDE       OTHER SURGICAL HISTORY          Child-Bearing Potential?: No    Race: Black or   Race (Secondary): N/A    : No    Ethnicity: Non-/     Vitals:  /76 (BP Location: Left arm)   Pulse 78   Temp 98.2  F (36.8  C)   Resp 18   Ht 1.778 m (5' 10\")   Wt 124.9 kg (275 lb 5.7 oz)   SpO2 98%   BMI 39.51 kg/m       Sponsor Expected Values   Blood Pressure: SBP: ; DBP: 40-90  Pulse:  bpm  Temp: 35.5-37.5  C  Respiration: 10-23  Ht: in cm  Wt: in kg  SpO2%: %  BMI: Rounded to nearest whole number    Repeated Measurements: (enter as needed)       Respiratory Conditions: N/A    Spirometer Test Results (FEV%):     Condition Severity: Not Applicable      Sleep Conditions:  Sleep Apnea Diagnosis: No  Use of CPAP at Night: No    Oxygen Therapy: No      Minutes of Exercise per Week: <20  Type of Activity: Cardio      Measurements & Preferences:  Dominant Hand: Left  Preferred Watch Hand: Left    Volunteer-Reported Pena Scale: 5  Staff-Recorded Pena Scale: 5    Hairiness Level: A: Thin Hair, Low Density     Wrist Circumference:  Left: 175 mm       Right: 176 mm          ECG:  Rhythm interpretation can be found in the Physical Exam note.   Heart Rate: 77   PVC/PACs: 8   Total Seconds: 60  % [place]: 10.53          STUDY PROCEDURE DATA     Spectrometer Values:            Left:   L*: 42.40    A*: 10.18   B*: 16.88      Right: L*: 44.44    A*: 10.42   B*: 17.98                 Environmental Conditions:   Temperature: 22  C  Barometric Pressure: 30.05 in (from weather.com)   Humidity: 20 %    Lux Level (Near Wrist):  Left: 415.0  Right: 504.7  Pre-Procedure Temperatures:  Left Wrist Skin: 31.8  C  Right Wrist Skin: 30.8  C  Finger Nellcor #1: 23.8  C  Finger Nellcor #2: 27.8  C    Study Date: 04/18/22  Study Time (Macbook Picture 1):     Device IDs  Left Watch ID (Size): [id number] " (40)  Right Watch ID (Size): [id number] (40)   Band Size  Left: M/L    Right: M/L  Secure Setting Notch  Left: 4   Right: 3  Watch Enclosure: Aluminum   ([id number] and [id number] are Titanium-delete if not applicable)  Nox ID: [id number]     Sync Box ID: [id number]     Nellcor #1 ID: [id number]  Nellcor #1 Location: Right Index Finger    Nellcor #2 ID: [id number]  Nellcor #2 Location: Right Ring Finger    Subject Transgressions: (time stamp and identify all extraneous subject movements, coughs, etc)   16:05 Cough   Sync device thin lines on both syncs  Extra sync at end that did not have watches at the end    Time Macbook based Picture 1: 15:47:51  Time Watch Left Based Picture 1: 15:47:50  Time Watch Right Based Picture 1: 15:47:51    Time Nellcor #1 Based Picture 2: 15:53:28   Time Nellcor #2 Based Picture 2: 15:53:27   Time Macbook Based Picture 2: 15:53:27     POST-PROCEDURE      Temperatures:  Left Wrist Skin (post): 32.6  C   Right Wrist Skin (post): 32.4  C  Finger Nellcor #1 (post): 29.4  C Finger Nellcor #2 (post): 29.4  C  Subject Performed Secure-1 Measurement? Yes  Moisturizing Cream/Lotion applied at wrist? No  Apple Watch Band Tightness During In-Lab Study: Snug but comfortable  Additional Comments (Device/participant issues):   Thinner Nox line when syncing  Extra sync at the end of session that was not covered by watches (IQVIA witnessed)    18-APR-2022  Manjula Washington, RN

## 2022-04-18 NOTE — PROGRESS NOTES
"   [facility] Study Consent On-Site Visit      Study description:   Multiconditions PPG Study. The purpose of this research study is to collect data related to health for the development of mobile technologies. This data will include physiological signal recordings from medical devices and data collection software on Apple Watches (\"study watches\"). This study is not to provide any treatment nor assess safety and effectiveness, but rather to collect information for research and  purposes.     Michelle Shetty a 61 year old female, was onsite today to discuss participation in the [facility] study.   The consent form was reviewed with the patient.     The review of the study included:    Study Purpose     COVID-19 Criteria     On-Site Study Participation    Participant Responsibilities      Study Data and Devices    Benefits and Risks of Participation    Compensation and Costs of Participation    Voluntary Participation    Confidentiality     Injury and Legal Rights    The subject was queried in regards to her willingness to continue and her questions were answered to her satisfaction.     The patient has given her agreement to volunteer to participate in the above noted study.     The In-Lab consent form and HIPPA form version 28-Mar-2022 was signed 18-APR-2022 onsite in the Clinic Research Unit.     A copy of the [facility] consent will be placed in subject's medical record.  A copy of the consent form was given to the subject today.    Study data is directly entered into Epic per protocol.     No study procedures were done prior to Michelle Shetty providing informed consent.       Manjula Washington RN     "

## 2022-04-19 LAB
ATRIAL RATE - MUSE: 441 BPM
DIASTOLIC BLOOD PRESSURE - MUSE: NORMAL MMHG
INTERPRETATION ECG - MUSE: NORMAL
P AXIS - MUSE: NORMAL DEGREES
PR INTERVAL - MUSE: NORMAL MS
QRS DURATION - MUSE: 78 MS
QT - MUSE: 394 MS
QTC - MUSE: 413 MS
R AXIS - MUSE: 33 DEGREES
SYSTOLIC BLOOD PRESSURE - MUSE: NORMAL MMHG
T AXIS - MUSE: -44 DEGREES
VENTRICULAR RATE- MUSE: 66 BPM

## 2022-04-22 DIAGNOSIS — I48.20 CHRONIC ATRIAL FIBRILLATION (H): ICD-10-CM

## 2022-04-22 DIAGNOSIS — I50.22 CHRONIC SYSTOLIC CONGESTIVE HEART FAILURE (H): ICD-10-CM

## 2022-04-24 RX ORDER — RIVAROXABAN 20 MG/1
TABLET, FILM COATED ORAL
Qty: 90 TABLET | Refills: 3 | Status: SHIPPED | OUTPATIENT
Start: 2022-04-24 | End: 2023-04-17

## 2022-04-24 RX ORDER — LISINOPRIL 5 MG/1
TABLET ORAL
Qty: 90 TABLET | Refills: 3 | Status: SHIPPED | OUTPATIENT
Start: 2022-04-24 | End: 2023-02-16

## 2022-07-20 NOTE — PROGRESS NOTES
Gynecologic Oncology Return Visit Note      Date: 2022    RE: Michelle Shetty  : 1960  HANG: 2022    CC: Atypical Endometrial Hyperplasia    HPI:  Michelle Shetty is a 61 year old woman with a diagnosis of Atypical Endometrial Hyperplasia, A.Fib, systolic heart failure. She presents for follow up of management of CAH.    She some intermittent pink discharge sometimes about every 6 months.   She reports her baseline SOB, denies chest pain. She says she has stopped taking her lasix daily and take sit every 4-5 days when she feels her SOB worsening. She does not follow with cardiology. She follows with Abiel Cruz in [place] in [place] -- he manages all of her medications.     She reports she was not able to have her hysterectomy because she was the sole caregiver for her father who recently passed away and she is now able to take the time off with surgery.     She reports she is a current smoker and is currently smoking 1/3 ppd. She reports a PSH of cholecystectomy, open myomectomy, and ruptured open appendix with drain placement and closure from secondary intention.       Oncology History:  She reports family history of her mother who  after undergoing hysterectomy for some cancer, maybe ovarian cancer. Patient has a lot of concerns about being able to safely undergo surgery.      2020: PMB, anticoagulated for A. Flutteronset of post menopausal bleeding starting in 2020.       Patient is referred by Dr. Cagle with the below noted work up completed.     20 Pelvic US 13.3 x 6.2 8.2 cm, 3 fibroids 3.8, 4.8 , 1.4 cm, endometrial stripe 11mm     10/20/2020 Endometrial Biopsy: Endometrial polyp with rare focus of atypical endometrial hyperplasia     12/10/2020 Mirena IUD placed    21 TVUS:   1. Borderline elevated endometrial thickness measuring 5 mm.  2. Focal heterogeneous fibroid in the posterior body of uterus measuring up to 2.9 cm.  3. Appropriate positioning of intrauterine  device.  4. Nonvisualization of the left ovary.     Medical History is notable for  Atrial fibrillation-rate controled on Metoprolol and Lisinopril and is on Xarelto.   Chronic Systolic Heart Failure: Echo 2017  EF 55% - takes furosemide    Depression: Mirtazepine        OBGYN history and Health Maintenance:     Last Pap Smear: 10/2020, NILM, HPV Negative  Last Mammogram:  None recent, ordered   Last Colonoscopy:  None recent       Review of Systems:  Systemic: no weight changes; no fever; no chills; no night sweats; no appetite changes  Skin: no rashes, or lesions  Eye: no irritation; no changes in vision  Shan-Laryngeal: no dysphagia; no hoarseness   Pulmonary: no cough; no shortness of breath  Cardiovascular: no chest pain; no palpitations  Gastrointestinal: no diarrhea; no constipation; no abdominal pain; no changes in bowel habits; no blood in stool  Genitourinary: no urinary frequency; no urinary urgency; no dysuria; no pain; no abnormal vaginal discharge; no abnormal vaginal bleeding  Breast: no breast discharge; no breast changes; no breast pain  Musculoskeletal: no myalgias; no arthralgias; no back pain  Psychiatric: no depressed mood; no anxiety    Hematologic: no tender lymph nodes; no noticeable swellings or lumps   Endocrine: no hot flashes; no heat/cold intolerance         Neurological: no tremor; no numbness and tingling; no headaches; no difficulty sleeping      Past Medical History:    Past Medical History:   Diagnosis Date     Anemia      Atrial flutter (H)      Diastolic heart failure (H)          Past Surgical History:    Past Surgical History:   Procedure Laterality Date     APPENDECTOMY       BREAST SURGERY      benign tumor left breast     CARDIOVERSION      1/16/15 for r flutter     CHOLECYSTECTOMY       CHOLECYSTECTOMY       FRACTURE SURGERY      ankle     H LAP ABLAT UTERINE FIBROIDS W/INTRAOP US GUIDE       MYOMECTOMY ABDOMINAL APPROACH       OTHER SURGICAL HISTORY       ablationUterine Fibroids         Health Maintenance Due   Topic Date Due     NICOTINE/TOBACCO CESSATION COUNSELING Q 1 YR  Never done     HF ACTION PLAN  Never done     ADVANCE CARE PLANNING  Never done     DEPRESSION ACTION PLAN  Never done     MAMMO SCREENING  Never done     COLORECTAL CANCER SCREENING  Never done     ZOSTER IMMUNIZATION (2 of 3) 10/30/2015     PREVENTIVE CARE VISIT  01/05/2016     CBC  08/25/2016     LUNG CANCER SCREENING  05/11/2018     ALT  12/04/2018     Pneumococcal Vaccine: Pediatrics (0 to 5 Years) and At-Risk Patients (6 to 64 Years) (2 - PCV) 12/05/2018     COVID-19 Vaccine (3 - Booster for Elijah series) 04/07/2022     BMP  06/07/2022     PHQ-9  06/07/2022       Current Medications:     Current Outpatient Medications   Medication Sig Dispense Refill     furosemide (LASIX) 40 MG tablet TAKE 1 TABLET(40 MG) BY MOUTH DAILY AS NEEDED 90 tablet 3     lisinopril (ZESTRIL) 5 MG tablet TAKE 1 TABLET(5 MG) BY MOUTH DAILY 90 tablet 3     Melatonin 10 MG TBCR Take 1 tablet by mouth nightly as needed       metoprolol succinate ER (TOPROL-XL) 100 MG 24 hr tablet TAKE 1 TABLET(100 MG) BY MOUTH DAILY. MAY TAKE ANOTHER DOSE AFTER 4 HOURS IF HEART RATE REMAINS GREATER THAN 120 DAILY 90 tablet 3     mirtazapine (REMERON) 15 MG tablet Take 1 tablet (15 mg) by mouth At Bedtime 90 tablet 3     Multiple Vitamins-Minerals (HAIR SKIN AND NAILS FORMULA PO) Take 3 tablets by mouth daily       order for DME Equipment being ordered: pulse oximeter 1 Device 0     XARELTO ANTICOAGULANT 20 MG TABS tablet TAKE 1 TABLET(20 MG) BY MOUTH DAILY WITH DINNER 90 tablet 3         Allergies:        Allergies   Allergen Reactions     Amoxicillin Anaphylaxis     Pt doesn't remember this and thinks that its okay     Codeine         Social History:     Social History     Tobacco Use     Smoking status: Current Some Day Smoker     Packs/day: 0.50     Types: Cigarettes     Smokeless tobacco: Never Used   Substance Use Topics      Alcohol use: Yes     Alcohol/week: 1.0 standard drink     Types: 1 Glasses of wine per week     Comment: Daily       History   Drug Use No         Family History:     The patient's family history is notable for:    Family History   Problem Relation Age of Onset     Diabetes Mother      Hypertension Mother      Ovarian Cancer Mother          age 79 after hysterectomy for cancer     Arrhythmia Father      Cerebrovascular Disease Father         2017     Myocardial Infarction Father 60     Arrhythmia Brother      Breast Cancer Other      No Known Problems Maternal Grandmother      No Known Problems Maternal Grandfather      No Known Problems Paternal Grandmother      No Known Problems Paternal Grandfather      No Known Problems Sister      No Known Problems Son      No Known Problems Daughter      No Known Problems Maternal Half-Brother      No Known Problems Maternal Half-Sister      No Known Problems Paternal Half-Brother      No Known Problems Paternal Half-Sister      No Known Problems Niece      No Known Problems Nephew      No Known Problems Cousin      Heart Disease Father      Atrial Flutter Father      Atrial Flutter Brother      Asthma Child        Physical Exam:     /72 (BP Location: Left arm, Patient Position: Fowlers, Cuff Size: Adult Large)   Pulse 72   Temp 98.3  F (36.8  C) (Oral)   Wt 125 kg (275 lb 9.6 oz)   SpO2 98%   BMI 39.54 kg/m    Body mass index is 39.54 kg/m .    General Appearance: healthy and alert, no distress    Eyes:  Eyes grossly normal to inspection.  No discharge or erythema, or obvious scleral/conjunctival abnormalities.    Respiratory: No audible wheeze, cough, or visible cyanosis.  No visible retractions or increased work of breathing.     Musculoskeletal: extremities non tender and without edema    Skin: no lesions or rashes on visible skin    Neurological: normal gait, no gross defects     Psychiatric: appropriate mood and affect. Mentation appears normal,  affect normal/bright, judgement and insight intact, normal speech and appearance well-groomed          Pelvic: normal external genitalia, cervix normal, no active bleeding, normal physiologic discharge, no IUD strings were not visible on exam, enlarged fibroid uterus                    Procedure Note:   Endometrial Biopsy:    - Verbal consent obtained from patient for procedure. Timeout was completed. Reviewed allergies, risks, and benefits of the procedure. The patient was placed in dorsal lithotomy, the cervix was cleaned with Betadine solution. The cervix was grasped with a ring forcep. The pipelle was then passed without difficulty 2x and endometrial tissue was obtained. The patient tolerated the procedure well. Samples were labeled.       Assessment: Michelle Shetty is a 61 year old woman with a diagnosis of CAH who present for continued mananagement -- currently being managed with hormonal therapy.      At this time she is ready to proceed with definitive surgery.     Plan:     1.) Michelle Shetty is a 60 year old woman with history of atypical endometrial hyperplasia arising in a polyp diagnosed in 10/2020. She has been reluctant to undergo surgery due to family responsibilities and concern about morbidity of surgery. She currently is under treatment with Mirena IUD, reports no vaginal bleeding. No tissue sampling since placement of IUD in 10/2020. Patient has been accepting of ultrasounds for monitoring, but last monitoring with ultrasound was 4/9/21 was 5 mm.    Patient presents today for definitve management. Reviewed possible laparoscopic TLH, BSO vs open due to extensive surgical history and enlarged fibroids. EMB collected today. Advised aptient to follow up in next 2 weeks with PCP for preoperative clearance and smoking cessation prior to surgery.      Will need pre-operative anesthesia clinic once surgery date confirmed. TVUS for evaluation of fibroids and IUD since strings not visible. Fabricio  understands and agrees to plan. Follow up in 2-3 weeks for vitual visit to review pathology results and schedule surgery after she has time to speak with her .            Fito Dinero MD  PGY 5 Gyn Oncology Fellow   July 21, 2022    Rossana Aguirre M.D., MPH,  F.A.C.O.G.  Professor  Department of Ob/Gyn and Women's Health  Division of Gynecologic Oncology  [facility]  [phone]    I saw the patient with the fellow.  I have reviewed and edited the fellow's note and plan of care.        Time: total time spent today, 7/21/22, including preparation, review of outside records, face to face counseling, and documentation was 30 minutes.     CC  Patient Care Team:  Ari Cruz MD as PCP - General (Family Practice)  Rossana Aguirre MD as MD (Gynecologic Oncology)  Stella Cagle MD as Referring Physician (OB/Gyn)  Rossana Aguirre MD as Assigned Cancer Care Provider  Ari Cruz MD as Assigned PCP  SELF, REFERRED

## 2022-07-21 ENCOUNTER — ONCOLOGY VISIT (OUTPATIENT)
Dept: ONCOLOGY | Facility: CLINIC | Age: 62
End: 2022-07-21
Attending: OBSTETRICS & GYNECOLOGY
Payer: COMMERCIAL

## 2022-07-21 VITALS
BODY MASS INDEX: 39.54 KG/M2 | SYSTOLIC BLOOD PRESSURE: 111 MMHG | WEIGHT: 275.6 LBS | DIASTOLIC BLOOD PRESSURE: 72 MMHG | OXYGEN SATURATION: 98 % | TEMPERATURE: 98.3 F | HEART RATE: 72 BPM

## 2022-07-21 DIAGNOSIS — N85.02 ENDOMETRIAL HYPERPLASIA WITH ATYPIA: Primary | ICD-10-CM

## 2022-07-21 DIAGNOSIS — Z80.3 FAMILY HISTORY OF MALIGNANT NEOPLASM OF BREAST: ICD-10-CM

## 2022-07-21 DIAGNOSIS — Z12.31 VISIT FOR SCREENING MAMMOGRAM: ICD-10-CM

## 2022-07-21 PROCEDURE — 58100 BIOPSY OF UTERUS LINING: CPT | Performed by: OBSTETRICS & GYNECOLOGY

## 2022-07-21 PROCEDURE — 88305 TISSUE EXAM BY PATHOLOGIST: CPT | Mod: TC | Performed by: OBSTETRICS & GYNECOLOGY

## 2022-07-21 PROCEDURE — 58100 BIOPSY OF UTERUS LINING: CPT

## 2022-07-21 PROCEDURE — 88305 TISSUE EXAM BY PATHOLOGIST: CPT | Mod: 26 | Performed by: PATHOLOGY

## 2022-07-21 PROCEDURE — 99213 OFFICE O/P EST LOW 20 MIN: CPT | Mod: 25 | Performed by: OBSTETRICS & GYNECOLOGY

## 2022-07-21 PROCEDURE — G0463 HOSPITAL OUTPT CLINIC VISIT: HCPCS | Mod: 25

## 2022-07-21 PROCEDURE — G0463 HOSPITAL OUTPT CLINIC VISIT: HCPCS

## 2022-07-21 ASSESSMENT — PAIN SCALES - GENERAL: PAINLEVEL: NO PAIN (0)

## 2022-07-21 NOTE — PATIENT INSTRUCTIONS
In the next few weeks  See your PCP   US and Mammogram   Follow up virtually with Dr Aguirre     My team will help schedule the US, mammogram and virtual visit with Dr Aguirre     It was a pleasuring seeing you     Hola

## 2022-07-21 NOTE — LETTER
2022         RE: Michelle Shetty  [address]  [facility] [address]        Dear Colleague,    Thank you for referring your patient, Michelle Shetty, to the [facility]. Please see a copy of my visit note below.    Gynecologic Oncology Return Visit Note      Date: 2022    RE: Michelle Shetty  : 1960  HANG: 2022    CC: Atypical Endometrial Hyperplasia    HPI:  Michelle Shetty is a 61 year old woman with a diagnosis of Atypical Endometrial Hyperplasia, A.Fib, systolic heart failure. She presents for follow up of management of CAH.    She some intermittent pink discharge sometimes about every 6 months.   She reports her baseline SOB, denies chest pain. She says she has stopped taking her lasix daily and take sit every 4-5 days when she feels her SOB worsening. She does not follow with cardiology. She follows with Abiel Cruz in [place] in [place] -- he manages all of her medications.     She reports she was not able to have her hysterectomy because she was the sole caregiver for her father who recently passed away and she is now able to take the time off with surgery.     She reports she is a current smoker and is currently smoking 1/3 ppd. She reports a PSH of cholecystectomy, open myomectomy, and ruptured open appendix with drain placement and closure from secondary intention.   Oncology History:  She reports family history of her mother who  after undergoing hysterectomy for some cancer, maybe ovarian cancer. Patient has a lot of concerns about being able to safely undergo surgery.      2020: PMB, anticoagulated for A. Flutteronset of post menopausal bleeding starting in 2020.       Patient is referred by Dr. Cagle with the below noted work up completed.     20 Pelvic US 13.3 x 6.2 8.2 cm, 3 fibroids 3.8, 4.8 , 1.4 cm, endometrial stripe 11mm     10/20/2020 Endometrial Biopsy: Endometrial polyp with rare focus of atypical  endometrial hyperplasia     12/10/2020 Mirena IUD placed    21 TVUS:   1. Borderline elevated endometrial thickness measuring 5 mm.  2. Focal heterogeneous fibroid in the posterior body of uterus measuring up to 2.9 cm.  3. Appropriate positioning of intrauterine device.  4. Nonvisualization of the left ovary.     Medical History is notable for  Atrial fibrillation-rate controled on Metoprolol and Lisinopril and is on Xarelto.   Chronic Systolic Heart Failure: Echo 2017  EF 55% - takes furosemide    Depression: Mirtazepine        OBGYN history and Health Maintenance:     Last Pap Smear: 10/2020, NILM, HPV Negative  Last Mammogram:  None recent, ordered   Last Colonoscopy:  None recent       Review of Systems:  Systemic: no weight changes; no fever; no chills; no night sweats; no appetite changes  Skin: no rashes, or lesions  Eye: no irritation; no changes in vision  Shan-Laryngeal: no dysphagia; no hoarseness   Pulmonary: no cough; no shortness of breath  Cardiovascular: no chest pain; no palpitations  Gastrointestinal: no diarrhea; no constipation; no abdominal pain; no changes in bowel habits; no blood in stool  Genitourinary: no urinary frequency; no urinary urgency; no dysuria; no pain; no abnormal vaginal discharge; no abnormal vaginal bleeding  Breast: no breast discharge; no breast changes; no breast pain  Musculoskeletal: no myalgias; no arthralgias; no back pain  Psychiatric: no depressed mood; no anxiety    Hematologic: no tender lymph nodes; no noticeable swellings or lumps   Endocrine: no hot flashes; no heat/cold intolerance         Neurological: no tremor; no numbness and tingling; no headaches; no difficulty sleeping      Past Medical History:    Past Medical History:   Diagnosis Date     Anemia      Atrial flutter (H)      Diastolic heart failure (H)          Past Surgical History:    Past Surgical History:   Procedure Laterality Date     APPENDECTOMY       BREAST SURGERY      benign  tumor left breast     CARDIOVERSION      1/16/15 for r flutter     CHOLECYSTECTOMY       CHOLECYSTECTOMY       FRACTURE SURGERY      ankle     H LAP ABLAT UTERINE FIBROIDS W/INTRAOP US GUIDE       MYOMECTOMY ABDOMINAL APPROACH       OTHER SURGICAL HISTORY      ablationUterine Fibroids         Health Maintenance Due   Topic Date Due     NICOTINE/TOBACCO CESSATION COUNSELING Q 1 YR  Never done     HF ACTION PLAN  Never done     ADVANCE CARE PLANNING  Never done     DEPRESSION ACTION PLAN  Never done     MAMMO SCREENING  Never done     COLORECTAL CANCER SCREENING  Never done     ZOSTER IMMUNIZATION (2 of 3) 10/30/2015     PREVENTIVE CARE VISIT  01/05/2016     CBC  08/25/2016     LUNG CANCER SCREENING  05/11/2018     ALT  12/04/2018     Pneumococcal Vaccine: Pediatrics (0 to 5 Years) and At-Risk Patients (6 to 64 Years) (2 - PCV) 12/05/2018     COVID-19 Vaccine (3 - Booster for Elijah series) 04/07/2022     BMP  06/07/2022     PHQ-9  06/07/2022       Current Medications:     Current Outpatient Medications   Medication Sig Dispense Refill     furosemide (LASIX) 40 MG tablet TAKE 1 TABLET(40 MG) BY MOUTH DAILY AS NEEDED 90 tablet 3     lisinopril (ZESTRIL) 5 MG tablet TAKE 1 TABLET(5 MG) BY MOUTH DAILY 90 tablet 3     Melatonin 10 MG TBCR Take 1 tablet by mouth nightly as needed       metoprolol succinate ER (TOPROL-XL) 100 MG 24 hr tablet TAKE 1 TABLET(100 MG) BY MOUTH DAILY. MAY TAKE ANOTHER DOSE AFTER 4 HOURS IF HEART RATE REMAINS GREATER THAN 120 DAILY 90 tablet 3     mirtazapine (REMERON) 15 MG tablet Take 1 tablet (15 mg) by mouth At Bedtime 90 tablet 3     Multiple Vitamins-Minerals (HAIR SKIN AND NAILS FORMULA PO) Take 3 tablets by mouth daily       order for DME Equipment being ordered: pulse oximeter 1 Device 0     XARELTO ANTICOAGULANT 20 MG TABS tablet TAKE 1 TABLET(20 MG) BY MOUTH DAILY WITH DINNER 90 tablet 3     Allergies:        Allergies   Allergen Reactions     Amoxicillin Anaphylaxis     Pt doesn't  remember this and thinks that its okay     Codeine         Social History:     Social History     Tobacco Use     Smoking status: Current Some Day Smoker     Packs/day: 0.50     Types: Cigarettes     Smokeless tobacco: Never Used   Substance Use Topics     Alcohol use: Yes     Alcohol/week: 1.0 standard drink     Types: 1 Glasses of wine per week     Comment: Daily       History   Drug Use No       Family History:     The patient's family history is notable for:    Family History   Problem Relation Age of Onset     Diabetes Mother      Hypertension Mother      Ovarian Cancer Mother          age 79 after hysterectomy for cancer     Arrhythmia Father      Cerebrovascular Disease Father         2017     Myocardial Infarction Father 60     Arrhythmia Brother      Breast Cancer Other      No Known Problems Maternal Grandmother      No Known Problems Maternal Grandfather      No Known Problems Paternal Grandmother      No Known Problems Paternal Grandfather      No Known Problems Sister      No Known Problems Son      No Known Problems Daughter      No Known Problems Maternal Half-Brother      No Known Problems Maternal Half-Sister      No Known Problems Paternal Half-Brother      No Known Problems Paternal Half-Sister      No Known Problems Niece      No Known Problems Nephew      No Known Problems Cousin      Heart Disease Father      Atrial Flutter Father      Atrial Flutter Brother      Asthma Child        Physical Exam:     /72 (BP Location: Left arm, Patient Position: Fowlers, Cuff Size: Adult Large)   Pulse 72   Temp 98.3  F (36.8  C) (Oral)   Wt 125 kg (275 lb 9.6 oz)   SpO2 98%   BMI 39.54 kg/m    Body mass index is 39.54 kg/m .    General Appearance: healthy and alert, no distress    Eyes:  Eyes grossly normal to inspection.  No discharge or erythema, or obvious scleral/conjunctival abnormalities.    Respiratory: No audible wheeze, cough, or visible cyanosis.  No visible retractions or  increased work of breathing.     Musculoskeletal: extremities non tender and without edema    Skin: no lesions or rashes on visible skin    Neurological: normal gait, no gross defects     Psychiatric: appropriate mood and affect. Mentation appears normal, affect normal/bright, judgement and insight intact, normal speech and appearance well-groomed          Pelvic: normal external genitalia, cervix normal, no active bleeding, normal physiologic discharge, no IUD strings were not visible on exam, enlarged fibroid uterus                    Procedure Note:   Endometrial Biopsy:    - Verbal consent obtained from patient for procedure. Timeout was completed. Reviewed allergies, risks, and benefits of the procedure. The patient was placed in dorsal lithotomy, the cervix was cleaned with Betadine solution. The cervix was grasped with a ring forcep. The pipelle was then passed without difficulty 2x and endometrial tissue was obtained. The patient tolerated the procedure well. Samples were labeled.       Assessment: Michelle Shetty is a 61 year old woman with a diagnosis of CAH who present for continued mananagement -- currently being managed with hormonal therapy.      At this time she is ready to proceed with definitive surgery.     Plan:     1.) Michelle Shetty is a 60 year old woman with history of atypical endometrial hyperplasia arising in a polyp diagnosed in 10/2020. She has been reluctant to undergo surgery due to family responsibilities and concern about morbidity of surgery. She currently is under treatment with Mirena IUD, reports no vaginal bleeding. No tissue sampling since placement of IUD in 10/2020. Patient has been accepting of ultrasounds for monitoring, but last monitoring with ultrasound was 4/9/21 was 5 mm.    Patient presents today for definitve management. Reviewed possible laparoscopic TLH, BSO vs open due to extensive surgical history and enlarged fibroids. EMB collected today. Advised aptient to  follow up in next 2 weeks with PCP for preoperative clearance and smoking cessation prior to surgery.      Will need pre-operative anesthesia clinic once surgery date confirmed. TVUS for evaluation of fibroids and IUD since strings not visible. Fabricio understands and agrees to plan. Follow up in 2-3 weeks for vitual visit to review pathology results and schedule surgery after she has time to speak with her .            Fito Dinero MD  PGY 5 Gyn Oncology Fellow   July 21, 2022    Rossana Aguirre M.D., MPH,  F.A.C.O.G.  Professor  Department of Ob/Gyn and Women's Health  Division of Gynecologic Oncology  [email or web address] [place]  [phone]    I saw the patient with the fellow.  I have reviewed and edited the fellow's note and plan of care.      Time: total time spent today, 7/21/22, including preparation, review of outside records, face to face counseling, and documentation was 30 minutes.     CC  Patient Care Team:  Ari Cruz MD as PCP - General (Family Practice)  Stella Cagle MD as Referring Physician (OB/Gyn)

## 2022-07-21 NOTE — NURSING NOTE
"Oncology Rooming Note    July 21, 2022 12:38 PM   Michelle Shetty is a 61 year old female who presents for:    Chief Complaint   Patient presents with     Oncology Clinic Visit     Rtn Follow Up     Initial Vitals: /72 (BP Location: Left arm, Patient Position: Fowlers, Cuff Size: Adult Large)   Pulse 72   Temp 98.3  F (36.8  C) (Oral)   Wt 125 kg (275 lb 9.6 oz)   SpO2 98%   BMI 39.54 kg/m   Estimated body mass index is 39.54 kg/m  as calculated from the following:    Height as of 4/18/22: 1.778 m (5' 10\").    Weight as of this encounter: 125 kg (275 lb 9.6 oz). Body surface area is 2.48 meters squared.  No Pain (0)   No LMP recorded. Patient is postmenopausal.  Allergies reviewed: Yes  Medications reviewed: Yes    Medications: Xarelto and Linsinopril  Pharmacy name entered into [facility] L[facility]:    [id number] - [facility], [place] - [address].  [id number] - [facility], [place] - [address] AT [address] [place] [address]    Clinical concerns: Pt has no concerns for their provider on July 21, 2022 and would like to discuss the original chief complaint of the appointment.     Nava Ariza, EMT on July 21, 2022 at 12:39 PM            "

## 2022-07-21 NOTE — NURSING NOTE
Prior to the start of the procedure and with procedural staff participation, I verbally confirmed the patient s identity using two indicators, relevant allergies, that the procedure was appropriate and matched the consent or emergent situation, and that the correct equipment/implants were available. Immediately prior to starting the procedure I conducted the Time Out with the procedural staff and re-confirmed the patient s name, procedure, and site/side. (The Joint Commission universal protocol was followed.)  Yes    Sedation (Moderate or Deep): None    Post procedure pain: 0    COMPLETED:  Endometrial Bx    Amy Handley RN

## 2022-07-22 LAB
PATH REPORT.COMMENTS IMP SPEC: NORMAL
PATH REPORT.COMMENTS IMP SPEC: NORMAL
PATH REPORT.FINAL DX SPEC: NORMAL
PATH REPORT.GROSS SPEC: NORMAL
PATH REPORT.MICROSCOPIC SPEC OTHER STN: NORMAL
PATH REPORT.RELEVANT HX SPEC: NORMAL
PHOTO IMAGE: NORMAL

## 2022-08-04 ENCOUNTER — ANCILLARY PROCEDURE (OUTPATIENT)
Dept: ULTRASOUND IMAGING | Facility: CLINIC | Age: 62
End: 2022-08-04
Attending: OBSTETRICS & GYNECOLOGY
Payer: COMMERCIAL

## 2022-08-04 ENCOUNTER — ANCILLARY PROCEDURE (OUTPATIENT)
Dept: MAMMOGRAPHY | Facility: CLINIC | Age: 62
End: 2022-08-04
Attending: OBSTETRICS & GYNECOLOGY
Payer: COMMERCIAL

## 2022-08-04 DIAGNOSIS — Z12.31 VISIT FOR SCREENING MAMMOGRAM: ICD-10-CM

## 2022-08-04 DIAGNOSIS — N85.02 ENDOMETRIAL HYPERPLASIA WITH ATYPIA: ICD-10-CM

## 2022-08-04 DIAGNOSIS — Z80.3 FAMILY HISTORY OF MALIGNANT NEOPLASM OF BREAST: ICD-10-CM

## 2022-08-04 PROCEDURE — 76830 TRANSVAGINAL US NON-OB: CPT | Mod: GC | Performed by: STUDENT IN AN ORGANIZED HEALTH CARE EDUCATION/TRAINING PROGRAM

## 2022-08-04 PROCEDURE — 76856 US EXAM PELVIC COMPLETE: CPT | Mod: GC | Performed by: STUDENT IN AN ORGANIZED HEALTH CARE EDUCATION/TRAINING PROGRAM

## 2022-08-04 PROCEDURE — 77067 SCR MAMMO BI INCL CAD: CPT | Performed by: RADIOLOGY

## 2022-08-10 NOTE — PROGRESS NOTES
Michelle is a 62 year old who is being evaluated via a billable video visit.      How would you like to obtain your AVS? MyChart  If the video visit is dropped, the invitation should be resent by: Text to cell phone: [phone]  Will anyone else be joining your video visit? Khadijah HERNÁNDEZ    Video-Visit Details    Video Start Time: 3:42 PM    Type of service:  Video Visit    Video End Time:3:56 PM    Originating Location (pt. Location): Other car    Distant Location (provider location):  [facility]     Platform used for Video Visit: [facility]      Gynecologic Oncology Return Visit Note          RE: Michelle Shetty  : 1960  HANG: 2022    CC: Atypical Endometrial Hyperplasia    HPI:  Michelle Shetty is a 61 year old woman with a diagnosis of Atypical Endometrial Hyperplasia, A.Fib, systolic heart failure. She presents for follow up of management of CAH.    Since her last visit she had a mammogram which now will require a biopsy scheduled next week.    She reports she was not able to have her hysterectomy because she was the sole caregiver for her father who recently passed away and she is now able to take the time off with surgery.     She reports she is a current smoker and is currently smoking 1/3 ppd. She reports a PSH of cholecystectomy, open myomectomy, and ruptured open appendix with drain placement and closure from secondary intention.       Oncology History:  She reports family history of her mother who  after undergoing hysterectomy for some cancer, maybe ovarian cancer. Patient has a lot of concerns about being able to safely undergo surgery.      2020: PMB, anticoagulated for A. Flutteronset of post menopausal bleeding starting in 2020.       Patient is referred by Dr. Cagle with the below noted work up completed.     20 Pelvic US 13.3 x 6.2 8.2 cm, 3 fibroids 3.8, 4.8 , 1.4 cm, endometrial stripe 11mm     10/20/2020 Endometrial  Biopsy: Endometrial polyp with rare focus of atypical endometrial hyperplasia     12/10/2020 Mirena IUD placed    21 TVUS:   1. Borderline elevated endometrial thickness measuring 5 mm.  2. Focal heterogeneous fibroid in the posterior body of uterus measuring up to 2.9 cm.  3. Appropriate positioning of intrauterine device.  4. Nonvisualization of the left ovary.     Medical History is notable for  Atrial fibrillation-rate controled on Metoprolol and Lisinopril and is on Xarelto.   Chronic Systolic Heart Failure: Echo 2017  EF 55% - takes furosemide    Depression: Mirtazepine      22 Endometrial Biopsy  Final Diagnosis  Endometrium, biopsy:  - Endocervical tissue with benign microglandular hyperplasia  - Occasional fragments of inactive endometrium with features consistent with exogenous progestin use  - Negative for hyperplasia or atypia    22 Pelvic US  IMPRESSION:      1. Endometrium measures 6 mm.  2. Uterine fibroid partially visualized.   3. Relatively unchanged positioning of the intrauterine device  compared to prior ultrasound 2021.  4. Left ovary is not demonstrated.    OBGYN history and Health Maintenance:     Last Pap Smear: 10/2020, NILM, HPV Negative  Last Mammogram:  None recent, ordered   Last Colonoscopy:  None recent       Review of Systems:  Systemic: no weight changes; no fever; no chills; no night sweats; no appetite changes  Skin: no rashes, or lesions  Eye: no irritation; no changes in vision  Shan-Laryngeal: no dysphagia; no hoarseness   Pulmonary: no cough; no shortness of breath  Cardiovascular: no chest pain; no palpitations  Gastrointestinal: no diarrhea; no constipation; no abdominal pain; no changes in bowel habits; no blood in stool  Genitourinary: no urinary frequency; no urinary urgency; no dysuria; no pain; no abnormal vaginal discharge; no abnormal vaginal bleeding  Breast: no breast discharge; no breast changes; no breast pain  Musculoskeletal: no  myalgias; no arthralgias; no back pain  Psychiatric: no depressed mood; no anxiety    Hematologic: no tender lymph nodes; no noticeable swellings or lumps   Endocrine: no hot flashes; no heat/cold intolerance         Neurological: no tremor; no numbness and tingling; no headaches; no difficulty sleeping      Past Medical History:    Past Medical History:   Diagnosis Date     Anemia      Atrial flutter (H)      Diastolic heart failure (H)          Past Surgical History:    Past Surgical History:   Procedure Laterality Date     APPENDECTOMY       BREAST SURGERY      benign tumor left breast     CARDIOVERSION      1/16/15 for r flutter     CHOLECYSTECTOMY       CHOLECYSTECTOMY       FRACTURE SURGERY      ankle     H LAP ABLAT UTERINE FIBROIDS W/INTRAOP US GUIDE       MYOMECTOMY ABDOMINAL APPROACH       OTHER SURGICAL HISTORY      ablationUterine Fibroids         Health Maintenance Due   Topic Date Due     NICOTINE/TOBACCO CESSATION COUNSELING Q 1 YR  Never done     HF ACTION PLAN  Never done     ADVANCE CARE PLANNING  Never done     DEPRESSION ACTION PLAN  Never done     COLORECTAL CANCER SCREENING  Never done     ZOSTER IMMUNIZATION (2 of 3) 10/30/2015     PREVENTIVE CARE VISIT  01/05/2016     CBC  08/25/2016     LUNG CANCER SCREENING  05/11/2018     ALT  12/04/2018     Pneumococcal Vaccine: Pediatrics (0 to 5 Years) and At-Risk Patients (6 to 64 Years) (2 - PCV) 12/05/2018     COVID-19 Vaccine (3 - Booster for Elijah series) 04/07/2022     BMP  06/07/2022     PHQ-9  06/07/2022       Current Medications:     Current Outpatient Medications   Medication Sig Dispense Refill     furosemide (LASIX) 40 MG tablet TAKE 1 TABLET(40 MG) BY MOUTH DAILY AS NEEDED 90 tablet 3     lisinopril (ZESTRIL) 5 MG tablet TAKE 1 TABLET(5 MG) BY MOUTH DAILY 90 tablet 3     Melatonin 10 MG TBCR Take 1 tablet by mouth nightly as needed       metoprolol succinate ER (TOPROL-XL) 100 MG 24 hr tablet TAKE 1 TABLET(100 MG) BY MOUTH DAILY. MAY TAKE  ANOTHER DOSE AFTER 4 HOURS IF HEART RATE REMAINS GREATER THAN 120 DAILY 90 tablet 3     mirtazapine (REMERON) 15 MG tablet Take 1 tablet (15 mg) by mouth At Bedtime 90 tablet 3     Multiple Vitamins-Minerals (HAIR SKIN AND NAILS FORMULA PO) Take 3 tablets by mouth daily       order for DME Equipment being ordered: pulse oximeter 1 Device 0     XARELTO ANTICOAGULANT 20 MG TABS tablet TAKE 1 TABLET(20 MG) BY MOUTH DAILY WITH DINNER 90 tablet 3         Allergies:        Allergies   Allergen Reactions     Amoxicillin Anaphylaxis     Pt doesn't remember this and thinks that its okay     Codeine         Social History:     Social History     Tobacco Use     Smoking status: Current Some Day Smoker     Packs/day: 0.50     Types: Cigarettes     Smokeless tobacco: Never Used   Substance Use Topics     Alcohol use: Yes     Alcohol/week: 1.0 standard drink     Types: 1 Glasses of wine per week     Comment: Daily       History   Drug Use No         Family History:     The patient's family history is notable for:    Family History   Problem Relation Age of Onset     Diabetes Mother      Hypertension Mother      Ovarian Cancer Mother          age 79 after hysterectomy for cancer     Arrhythmia Father      Cerebrovascular Disease Father         2017     Myocardial Infarction Father 60     Arrhythmia Brother      Breast Cancer Other      No Known Problems Maternal Grandmother      No Known Problems Maternal Grandfather      No Known Problems Paternal Grandmother      No Known Problems Paternal Grandfather      No Known Problems Sister      No Known Problems Son      No Known Problems Daughter      No Known Problems Maternal Half-Brother      No Known Problems Maternal Half-Sister      No Known Problems Paternal Half-Brother      No Known Problems Paternal Half-Sister      No Known Problems Niece      No Known Problems Nephew      No Known Problems Cousin      Heart Disease Father      Atrial Flutter Father      Atrial  Flutter Brother      Asthma Child        Physical Exam:   Virtualvisit  General Appearance: healthy and alert, no distress     Psychiatric: appropriate mood and affect. Mentation appears normal, affect normal/bright, judgement and insight intact, normal speech and appearance well-groomed           Assessment and Plan:  Michelle Shetty is a 60 year old woman with history of atypical endometrial hyperplasia arising in a polyp diagnosed in 10/2020. She has been reluctant to undergo surgery due to family responsibilities and concern about morbidity of surgery. She currently is under treatment with Mirena IUD, reports no vaginal bleeding.     Most recent endometrial biopsy shows no hyperplasia or malignancy. At this time we will continue with conservative management using Mirena IUD. Follow up in 6 months with Pelvic US and will repeat endometrial biopsy.    She should return sooner with vaginal bleeding or if use Mirena IUD is of concern for management of any new breast issue.      Rossana Aguirre M.D., MPH,  F.A.C.O.G.  Professor  Department of Ob/Gyn and Women's Health  Division of Gynecologic Oncology  [facility]  [phone]      Time: total time spent today, August 11, 2022, including preparation, review of outside records, face to face counseling, and documentation was 20 minutes.     CC  Patient Care Team:  Ari Cruz MD as PCP - General (Family Practice)  Rossana Aguirre MD as MD (Gynecologic Oncology)  Stella Cagle MD (Inactive) as Referring Physician (OB/Gyn)  Rossana Aguirre MD as Assigned Cancer Care Provider  Ari Cruz MD as Assigned PCP  SELF, REFERRED

## 2022-08-11 ENCOUNTER — VIRTUAL VISIT (OUTPATIENT)
Dept: ONCOLOGY | Facility: CLINIC | Age: 62
End: 2022-08-11
Attending: OBSTETRICS & GYNECOLOGY
Payer: COMMERCIAL

## 2022-08-11 ENCOUNTER — ANCILLARY PROCEDURE (OUTPATIENT)
Dept: MAMMOGRAPHY | Facility: CLINIC | Age: 62
End: 2022-08-11
Attending: OBSTETRICS & GYNECOLOGY
Payer: COMMERCIAL

## 2022-08-11 DIAGNOSIS — R92.8 ABNORMAL MAMMOGRAM OF RIGHT BREAST: ICD-10-CM

## 2022-08-11 DIAGNOSIS — N85.02 ENDOMETRIAL HYPERPLASIA WITH ATYPIA: Primary | ICD-10-CM

## 2022-08-11 PROCEDURE — 77065 DX MAMMO INCL CAD UNI: CPT | Mod: RT | Performed by: STUDENT IN AN ORGANIZED HEALTH CARE EDUCATION/TRAINING PROGRAM

## 2022-08-11 PROCEDURE — 99213 OFFICE O/P EST LOW 20 MIN: CPT | Mod: GT | Performed by: OBSTETRICS & GYNECOLOGY

## 2022-08-11 PROCEDURE — G0463 HOSPITAL OUTPT CLINIC VISIT: HCPCS | Mod: PN,RTG | Performed by: OBSTETRICS & GYNECOLOGY

## 2022-08-11 PROCEDURE — G0279 TOMOSYNTHESIS, MAMMO: HCPCS | Performed by: STUDENT IN AN ORGANIZED HEALTH CARE EDUCATION/TRAINING PROGRAM

## 2022-08-11 NOTE — LETTER
2022         RE: Michelle Shetty  [address]  [facility] [address]        Dear Colleague,    Thank you for referring your patient, Michelle Shetty, to the [facility]. Please see a copy of my visit note below.        Gynecologic Oncology Return Visit Note          RE: Michelle Shetty  : 1960  HANG: 2022    CC: Atypical Endometrial Hyperplasia    HPI:  Michelle Shetty is a 61 year old woman with a diagnosis of Atypical Endometrial Hyperplasia, A.Fib, systolic heart failure. She presents for follow up of management of CAH.    Since her last visit she had a mammogram which now will require a biopsy scheduled next week.    She reports she was not able to have her hysterectomy because she was the sole caregiver for her father who recently passed away and she is now able to take the time off with surgery.     She reports she is a current smoker and is currently smoking 1/3 ppd. She reports a PSH of cholecystectomy, open myomectomy, and ruptured open appendix with drain placement and closure from secondary intention.       Oncology History:  She reports family history of her mother who  after undergoing hysterectomy for some cancer, maybe ovarian cancer. Patient has a lot of concerns about being able to safely undergo surgery.      2020: PMB, anticoagulated for A. Flutteronset of post menopausal bleeding starting in 2020.       Patient is referred by Dr. Cagle with the below noted work up completed.     20 Pelvic US 13.3 x 6.2 8.2 cm, 3 fibroids 3.8, 4.8 , 1.4 cm, endometrial stripe 11mm     10/20/2020 Endometrial Biopsy: Endometrial polyp with rare focus of atypical endometrial hyperplasia     12/10/2020 Mirena IUD placed    21 TVUS:   1. Borderline elevated endometrial thickness measuring 5 mm.  2. Focal heterogeneous fibroid in the posterior body of uterus measuring up to 2.9 cm.  3. Appropriate positioning of intrauterine  device.  4. Nonvisualization of the left ovary.     Medical History is notable for  Atrial fibrillation-rate controled on Metoprolol and Lisinopril and is on Xarelto.   Chronic Systolic Heart Failure: Echo 2017  EF 55% - takes furosemide    Depression: Mirtazepine      22 Endometrial Biopsy  Final Diagnosis  Endometrium, biopsy:  - Endocervical tissue with benign microglandular hyperplasia  - Occasional fragments of inactive endometrium with features consistent with exogenous progestin use  - Negative for hyperplasia or atypia    22 Pelvic US  IMPRESSION:      1. Endometrium measures 6 mm.  2. Uterine fibroid partially visualized.   3. Relatively unchanged positioning of the intrauterine device  compared to prior ultrasound 2021.  4. Left ovary is not demonstrated.    OBGYN history and Health Maintenance:     Last Pap Smear: 10/2020, NILM, HPV Negative  Last Mammogram:  None recent, ordered   Last Colonoscopy:  None recent       Review of Systems:  Systemic: no weight changes; no fever; no chills; no night sweats; no appetite changes  Skin: no rashes, or lesions  Eye: no irritation; no changes in vision  Shan-Laryngeal: no dysphagia; no hoarseness   Pulmonary: no cough; no shortness of breath  Cardiovascular: no chest pain; no palpitations  Gastrointestinal: no diarrhea; no constipation; no abdominal pain; no changes in bowel habits; no blood in stool  Genitourinary: no urinary frequency; no urinary urgency; no dysuria; no pain; no abnormal vaginal discharge; no abnormal vaginal bleeding  Breast: no breast discharge; no breast changes; no breast pain  Musculoskeletal: no myalgias; no arthralgias; no back pain  Psychiatric: no depressed mood; no anxiety    Hematologic: no tender lymph nodes; no noticeable swellings or lumps   Endocrine: no hot flashes; no heat/cold intolerance         Neurological: no tremor; no numbness and tingling; no headaches; no difficulty sleeping      Past Medical  History:    Past Medical History:   Diagnosis Date     Anemia      Atrial flutter (H)      Diastolic heart failure (H)          Past Surgical History:    Past Surgical History:   Procedure Laterality Date     APPENDECTOMY       BREAST SURGERY      benign tumor left breast     CARDIOVERSION      1/16/15 for r flutter     CHOLECYSTECTOMY       CHOLECYSTECTOMY       FRACTURE SURGERY      ankle     H LAP ABLAT UTERINE FIBROIDS W/INTRAOP US GUIDE       MYOMECTOMY ABDOMINAL APPROACH       OTHER SURGICAL HISTORY      ablationUterine Fibroids         Health Maintenance Due   Topic Date Due     NICOTINE/TOBACCO CESSATION COUNSELING Q 1 YR  Never done     HF ACTION PLAN  Never done     ADVANCE CARE PLANNING  Never done     DEPRESSION ACTION PLAN  Never done     COLORECTAL CANCER SCREENING  Never done     ZOSTER IMMUNIZATION (2 of 3) 10/30/2015     PREVENTIVE CARE VISIT  01/05/2016     CBC  08/25/2016     LUNG CANCER SCREENING  05/11/2018     ALT  12/04/2018     Pneumococcal Vaccine: Pediatrics (0 to 5 Years) and At-Risk Patients (6 to 64 Years) (2 - PCV) 12/05/2018     COVID-19 Vaccine (3 - Booster for Elijah series) 04/07/2022     BMP  06/07/2022     PHQ-9  06/07/2022       Current Medications:     Current Outpatient Medications   Medication Sig Dispense Refill     furosemide (LASIX) 40 MG tablet TAKE 1 TABLET(40 MG) BY MOUTH DAILY AS NEEDED 90 tablet 3     lisinopril (ZESTRIL) 5 MG tablet TAKE 1 TABLET(5 MG) BY MOUTH DAILY 90 tablet 3     Melatonin 10 MG TBCR Take 1 tablet by mouth nightly as needed       metoprolol succinate ER (TOPROL-XL) 100 MG 24 hr tablet TAKE 1 TABLET(100 MG) BY MOUTH DAILY. MAY TAKE ANOTHER DOSE AFTER 4 HOURS IF HEART RATE REMAINS GREATER THAN 120 DAILY 90 tablet 3     mirtazapine (REMERON) 15 MG tablet Take 1 tablet (15 mg) by mouth At Bedtime 90 tablet 3     Multiple Vitamins-Minerals (HAIR SKIN AND NAILS FORMULA PO) Take 3 tablets by mouth daily       order for DME Equipment being ordered: pulse  oximeter 1 Device 0     XARELTO ANTICOAGULANT 20 MG TABS tablet TAKE 1 TABLET(20 MG) BY MOUTH DAILY WITH DINNER 90 tablet 3         Allergies:        Allergies   Allergen Reactions     Amoxicillin Anaphylaxis     Pt doesn't remember this and thinks that its okay     Codeine         Social History:     Social History     Tobacco Use     Smoking status: Current Some Day Smoker     Packs/day: 0.50     Types: Cigarettes     Smokeless tobacco: Never Used   Substance Use Topics     Alcohol use: Yes     Alcohol/week: 1.0 standard drink     Types: 1 Glasses of wine per week     Comment: Daily       History   Drug Use No         Family History:     The patient's family history is notable for:    Family History   Problem Relation Age of Onset     Diabetes Mother      Hypertension Mother      Ovarian Cancer Mother          age 79 after hysterectomy for cancer     Arrhythmia Father      Cerebrovascular Disease Father         2017     Myocardial Infarction Father 60     Arrhythmia Brother      Breast Cancer Other      No Known Problems Maternal Grandmother      No Known Problems Maternal Grandfather      No Known Problems Paternal Grandmother      No Known Problems Paternal Grandfather      No Known Problems Sister      No Known Problems Son      No Known Problems Daughter      No Known Problems Maternal Half-Brother      No Known Problems Maternal Half-Sister      No Known Problems Paternal Half-Brother      No Known Problems Paternal Half-Sister      No Known Problems Niece      No Known Problems Nephew      No Known Problems Cousin      Heart Disease Father      Atrial Flutter Father      Atrial Flutter Brother      Asthma Child        Physical Exam:   Virtualvisit  General Appearance: healthy and alert, no distress     Psychiatric: appropriate mood and affect. Mentation appears normal, affect normal/bright, judgement and insight intact, normal speech and appearance well-groomed           Assessment and Plan:   Michelle Shetty is a 60 year old woman with history of atypical endometrial hyperplasia arising in a polyp diagnosed in 10/2020. She has been reluctant to undergo surgery due to family responsibilities and concern about morbidity of surgery. She currently is under treatment with Mirena IUD, reports no vaginal bleeding.     Most recent endometrial biopsy shows no hyperplasia or malignancy. At this time we will continue with conservative management using Mirena IUD. Follow up in 6 months with Pelvic US and will repeat endometrial biopsy.    She should return sooner with vaginal bleeding or if use Mirena IUD is of concern for management of any new breast issue.      Rossana Aguirre M.D., MPH,  F.A.C.O.G.  Professor  Department of Ob/Gyn and Women's Health  Division of Gynecologic Oncology  [facility] [facility]  [phone]      Time: total time spent today, August 11, 2022, including preparation, review of outside records, face to face counseling, and documentation was 20 minutes.     CC  Patient Care Team:  Ari Cruz MD as PCP - General (Family Practice)  Rossana Aguirre MD as MD (Gynecologic Oncology)  Stella Cagle MD (Inactive) as Referring Physician (OB/Gyn)

## 2022-08-11 NOTE — PATIENT INSTRUCTIONS
It was a pleasure seeing you in clinic today-please reach out if there are any further questions that arise following today's visit.  During business hours, you may reach me at [phone].  For urgent/emergent questions after business hours, you may reach the on-call Gynecologic Oncology Resident through the [facility]  at [phone].    All normal test results are usually communicated via letter or Send the Trendhart message.  Abnormal results (those that require a change in the previously discussed plan of care) are usually communicated via a phone call.    I recommend signing up for V I O access if you have not already done so.  This allows you online access to your lab results and also helps you communicate efficiently with your clinic should any questions arise in your care.    Follow up appointment in 6 months, scheduling will call you to help make an appointment  referral to radiology for pelvic ultrasound prior to md appointment, scheduling will call you to help make an appointment      Dr Timothy TRAN RN  Phone:  [phone]  Fax:  [phone]

## 2022-08-22 NOTE — PROGRESS NOTES
Problem: Behavioral Health Plan of Care  Goal: Patient-Specific Goal (Individualization)  Description: Patient will report at least 6-8 hours of sleep each night.   Patient will complete all ADL's independently while on the unit.   Patient will be compliant with treatment team recommendations.     Pt will eat at least 50% of meals and have enough fluid intake.  Outcome: Ongoing, Progressing     Problem: Thought Process Alteration  Goal: Optimal Thought Clarity  Description: Patient will hold reality based conversations while on the unit.     Outcome: Ongoing, Progressing     Problem: Suicidal Behavior  Goal: Suicidal Behavior is Absent or Managed  Outcome: Ongoing, Progressing   Goal Outcome Evaluation:      Pt presently sitting playing cards with 3 other peers. Noted to be talking and interacting with them. Cooperative with assessment. Remains flat and blunted . Hands do shake some when giving his medication. No requests and or c/o's.  2017- requested and received Clinoril 150 mg po c/o low back pain  Face to face end of shift report communicated to TRAE Brown RN  8/22/2022  5:03 PM                      Michelle is a 60 year old who is being evaluated via a billable video visit.      How would you like to obtain your AVS? MyChart  If the video visit is dropped, the invitation should be resent by: Text to cell phone: [phone]  Will anyone else be joining your video visit? No      Video Start Time:   Video-Visit Details    Type of service:  Video Visit    Video End Time:   Originating Location (pt. Location): Home    Distant Location (provider location):  [facility]     Platform used for Video Visit: Antoinette Parra CMA

## 2022-08-23 ENCOUNTER — ANCILLARY PROCEDURE (OUTPATIENT)
Dept: MAMMOGRAPHY | Facility: CLINIC | Age: 62
End: 2022-08-23
Attending: OBSTETRICS & GYNECOLOGY
Payer: COMMERCIAL

## 2022-08-23 DIAGNOSIS — R92.8 ABNORMAL MAMMOGRAM OF RIGHT BREAST: ICD-10-CM

## 2022-08-23 PROCEDURE — 19084 BX BREAST ADD LESION US IMAG: CPT | Mod: RT | Performed by: STUDENT IN AN ORGANIZED HEALTH CARE EDUCATION/TRAINING PROGRAM

## 2022-08-23 PROCEDURE — 88305 TISSUE EXAM BY PATHOLOGIST: CPT | Mod: 26 | Performed by: PATHOLOGY

## 2022-08-23 PROCEDURE — 88305 TISSUE EXAM BY PATHOLOGIST: CPT | Mod: TC | Performed by: OBSTETRICS & GYNECOLOGY

## 2022-08-23 PROCEDURE — 88342 IMHCHEM/IMCYTCHM 1ST ANTB: CPT | Mod: 26 | Performed by: PATHOLOGY

## 2022-08-23 PROCEDURE — 88341 IMHCHEM/IMCYTCHM EA ADD ANTB: CPT | Mod: 26 | Performed by: PATHOLOGY

## 2022-08-25 ENCOUNTER — TELEPHONE (OUTPATIENT)
Dept: MAMMOGRAPHY | Facility: CLINIC | Age: 62
End: 2022-08-25

## 2022-08-25 NOTE — TELEPHONE ENCOUNTER
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 Notified patient that the Radiologist recommendation is  routine yearly mammography. Patient verbalized understanding and all questions and concerns answered to patients satisfaction.

## 2022-09-19 DIAGNOSIS — I50.22 CHRONIC SYSTOLIC CONGESTIVE HEART FAILURE (H): ICD-10-CM

## 2022-09-19 RX ORDER — METOPROLOL SUCCINATE 100 MG/1
TABLET, EXTENDED RELEASE ORAL
Qty: 90 TABLET | Refills: 3 | Status: SHIPPED | OUTPATIENT
Start: 2022-09-19 | End: 2023-08-25

## 2022-11-20 ENCOUNTER — HEALTH MAINTENANCE LETTER (OUTPATIENT)
Age: 62
End: 2022-11-20

## 2022-11-21 DIAGNOSIS — F32.1 CURRENT MODERATE EPISODE OF MAJOR DEPRESSIVE DISORDER WITHOUT PRIOR EPISODE (H): ICD-10-CM

## 2022-11-22 RX ORDER — MIRTAZAPINE 15 MG/1
TABLET, FILM COATED ORAL
Qty: 90 TABLET | Refills: 3 | Status: SHIPPED | OUTPATIENT
Start: 2022-11-22 | End: 2023-11-01

## 2023-01-09 ASSESSMENT — ENCOUNTER SYMPTOMS
DIZZINESS: 0
HEARTBURN: 0
HEADACHES: 0
BREAST MASS: 0
JOINT SWELLING: 0
NAUSEA: 0
MYALGIAS: 0
COUGH: 0
EYE PAIN: 0
FREQUENCY: 0
WEAKNESS: 0
CONSTIPATION: 0
DIARRHEA: 0
CHILLS: 0
SHORTNESS OF BREATH: 0
PARESTHESIAS: 0
ABDOMINAL PAIN: 0
PALPITATIONS: 0
DYSURIA: 0
NERVOUS/ANXIOUS: 0
HEMATOCHEZIA: 0
FEVER: 0
ARTHRALGIAS: 0
HEMATURIA: 0
SORE THROAT: 0

## 2023-01-10 ENCOUNTER — TELEPHONE (OUTPATIENT)
Dept: FAMILY MEDICINE | Facility: CLINIC | Age: 63
End: 2023-01-10

## 2023-01-10 ENCOUNTER — OFFICE VISIT (OUTPATIENT)
Dept: FAMILY MEDICINE | Facility: CLINIC | Age: 63
End: 2023-01-10
Payer: COMMERCIAL

## 2023-01-10 VITALS
BODY MASS INDEX: 40.04 KG/M2 | SYSTOLIC BLOOD PRESSURE: 120 MMHG | HEART RATE: 59 BPM | RESPIRATION RATE: 16 BRPM | OXYGEN SATURATION: 98 % | DIASTOLIC BLOOD PRESSURE: 86 MMHG | WEIGHT: 286 LBS | TEMPERATURE: 98.5 F | HEIGHT: 71 IN

## 2023-01-10 DIAGNOSIS — I50.22 CHRONIC SYSTOLIC HEART FAILURE (H): ICD-10-CM

## 2023-01-10 DIAGNOSIS — R73.03 PREDIABETES: ICD-10-CM

## 2023-01-10 DIAGNOSIS — N85.02 ENDOMETRIAL HYPERPLASIA WITH ATYPIA: ICD-10-CM

## 2023-01-10 DIAGNOSIS — E66.01 MORBID OBESITY (H): Primary | ICD-10-CM

## 2023-01-10 DIAGNOSIS — Z23 HIGH PRIORITY FOR 2019-NCOV VACCINE: ICD-10-CM

## 2023-01-10 DIAGNOSIS — F32.1 CURRENT MODERATE EPISODE OF MAJOR DEPRESSIVE DISORDER WITHOUT PRIOR EPISODE (H): ICD-10-CM

## 2023-01-10 DIAGNOSIS — Z23 NEED FOR PROPHYLACTIC VACCINATION AND INOCULATION AGAINST INFLUENZA: ICD-10-CM

## 2023-01-10 DIAGNOSIS — I50.22 CHRONIC SYSTOLIC CONGESTIVE HEART FAILURE (H): ICD-10-CM

## 2023-01-10 DIAGNOSIS — Z12.11 COLON CANCER SCREENING: ICD-10-CM

## 2023-01-10 DIAGNOSIS — I48.92 ATRIAL FLUTTER, UNSPECIFIED TYPE (H): ICD-10-CM

## 2023-01-10 LAB
ANION GAP SERPL CALCULATED.3IONS-SCNC: 11 MMOL/L (ref 7–15)
BUN SERPL-MCNC: 15.2 MG/DL (ref 8–23)
CALCIUM SERPL-MCNC: 9.2 MG/DL (ref 8.8–10.2)
CHLORIDE SERPL-SCNC: 102 MMOL/L (ref 98–107)
CHOLEST SERPL-MCNC: 216 MG/DL
CREAT SERPL-MCNC: 1.03 MG/DL (ref 0.51–0.95)
DEPRECATED HCO3 PLAS-SCNC: 27 MMOL/L (ref 22–29)
GFR SERPL CREATININE-BSD FRML MDRD: 61 ML/MIN/1.73M2
GLUCOSE SERPL-MCNC: 142 MG/DL (ref 70–99)
HBA1C MFR BLD: 6.2 % (ref 0–5.6)
HDLC SERPL-MCNC: 54 MG/DL
LDLC SERPL CALC-MCNC: 128 MG/DL
NONHDLC SERPL-MCNC: 162 MG/DL
POTASSIUM SERPL-SCNC: 4.2 MMOL/L (ref 3.4–5.3)
SODIUM SERPL-SCNC: 140 MMOL/L (ref 136–145)
TRIGL SERPL-MCNC: 168 MG/DL

## 2023-01-10 PROCEDURE — 0134A COVID-19 VACCINE BIVALENT BOOSTER 18+ (MODERNA): CPT | Performed by: FAMILY MEDICINE

## 2023-01-10 PROCEDURE — 99213 OFFICE O/P EST LOW 20 MIN: CPT | Mod: 25 | Performed by: FAMILY MEDICINE

## 2023-01-10 PROCEDURE — 99396 PREV VISIT EST AGE 40-64: CPT | Mod: 25 | Performed by: FAMILY MEDICINE

## 2023-01-10 PROCEDURE — 90471 IMMUNIZATION ADMIN: CPT | Performed by: FAMILY MEDICINE

## 2023-01-10 PROCEDURE — 90686 IIV4 VACC NO PRSV 0.5 ML IM: CPT | Performed by: FAMILY MEDICINE

## 2023-01-10 PROCEDURE — 90677 PCV20 VACCINE IM: CPT | Performed by: FAMILY MEDICINE

## 2023-01-10 PROCEDURE — 91313 COVID-19 VACCINE BIVALENT BOOSTER 18+ (MODERNA): CPT | Performed by: FAMILY MEDICINE

## 2023-01-10 PROCEDURE — 83036 HEMOGLOBIN GLYCOSYLATED A1C: CPT | Performed by: FAMILY MEDICINE

## 2023-01-10 PROCEDURE — 36415 COLL VENOUS BLD VENIPUNCTURE: CPT | Performed by: FAMILY MEDICINE

## 2023-01-10 PROCEDURE — 80048 BASIC METABOLIC PNL TOTAL CA: CPT | Performed by: FAMILY MEDICINE

## 2023-01-10 PROCEDURE — 90472 IMMUNIZATION ADMIN EACH ADD: CPT | Performed by: FAMILY MEDICINE

## 2023-01-10 PROCEDURE — 80061 LIPID PANEL: CPT | Performed by: FAMILY MEDICINE

## 2023-01-10 RX ORDER — FUROSEMIDE 40 MG
TABLET ORAL
Qty: 90 TABLET | Refills: 3 | Status: SHIPPED | OUTPATIENT
Start: 2023-01-10 | End: 2024-01-16

## 2023-01-10 RX ORDER — ZINC SULFATE 50(220)MG
220 CAPSULE ORAL DAILY
COMMUNITY
End: 2024-04-26

## 2023-01-10 ASSESSMENT — ENCOUNTER SYMPTOMS
HEADACHES: 0
WEAKNESS: 0
JOINT SWELLING: 0
SORE THROAT: 0
PALPITATIONS: 0
DYSURIA: 0
NAUSEA: 0
HEMATOCHEZIA: 0
HEARTBURN: 0
CHILLS: 0
ABDOMINAL PAIN: 0
COUGH: 0
BREAST MASS: 0
FREQUENCY: 0
DIZZINESS: 0
HEMATURIA: 0
FEVER: 0
CONSTIPATION: 0
EYE PAIN: 0
ARTHRALGIAS: 0
PARESTHESIAS: 0
SHORTNESS OF BREATH: 0
MYALGIAS: 0
NERVOUS/ANXIOUS: 0
DIARRHEA: 0

## 2023-01-10 NOTE — PROGRESS NOTES
SUBJECTIVE:   CC: Michelle is an 62 year old who presents for preventive health visit.   Patient has been advised of split billing requirements and indicates understanding: Yes  Healthy Habits:     Getting at least 3 servings of Calcium per day:  Yes    Bi-annual eye exam:  NO    Dental care twice a year:  NO    Sleep apnea or symptoms of sleep apnea:  Daytime drowsiness    Diet:  Regular (no restrictions)    Frequency of exercise:  None    Taking medications regularly:  Yes    Medication side effects:  None    PHQ-2 Total Score: 1    Additional concerns today:  No    Ability to successfully perform activities of daily living: Yes, no assistance needed  Home safety:  none identified   Hearing impairment: No      Today's PHQ-2 Score:   PHQ-2 (  [employer]) 2023   Q1: Little interest or pleasure in doing things 1   Q2: Feeling down, depressed or hopeless 0   PHQ-2 Score 1   PHQ-2 Total Score (12-17 Years)- Positive if 3 or more points; Administer PHQ-A if positive -   Q1: Little interest or pleasure in doing things Several days   Q2: Feeling down, depressed or hopeless Not at all   PHQ-2 Score 1       Have you ever done Advance Care Planning? (For example, a Health Directive, POLST, or a discussion with a medical provider or your loved ones about your wishes): No, advance care planning information given to patient to review.  Patient plans to discuss their wishes with loved ones or provider.       Her father  last April.  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  Although it is greatly missed, her life has gotten easier since he passed.    Now it is just her and her brother in [place].    She has one son aged 29.      She had an abnormal mammogram in August but the bxs were negative (results reviewed in Epic).    Social History     Tobacco Use     Smoking status: Some Days     Packs/day: 0.50     Types: Cigarettes     Smokeless tobacco: Never   Substance Use Topics     Alcohol use: Yes      Alcohol/week: 1.0 standard drink     Types: 1 Glasses of wine per week     Comment: Daily     If you drink alcohol do you typically have >3 drinks per day or >7 drinks per week? No    Alcohol Use 1/9/2023   Prescreen: >3 drinks/day or >7 drinks/week? No   No flowsheet data found.    Reviewed orders with patient.  Reviewed health maintenance and updated orders accordingly - Yes  Lab work is in process    Breast Cancer Screening:    FHS-7:   Breast CA Risk Assessment (FHS-7) 8/4/2022   Did any of your first-degree relatives have breast or ovarian cancer? No   Did any of your relatives have bilateral breast cancer? No   Did any man in your family have breast cancer? No   Did any woman in your family have breast and ovarian cancer? No   Did any woman in your family have breast cancer before age 50 y? No   Do you have 2 or more relatives with breast and/or ovarian cancer? Yes   Do you have 2 or more relatives with breast and/or bowel cancer? No     click delete button to remove this line now  Mammogram Screening - Mammography discussed, not appropriate due to Had a mammogram in August.  Pertinent mammograms are reviewed under the imaging tab.    History of abnormal Pap smear: She has endometrial hyperplasia and follows with a gynecologic oncologist.  She has the Mirena IUD for treatment of this.  Hysterectomy has been recommended to the patient and she declined that.  She has been doing well with the IUD.  PAP / HPV Latest Ref Rng & Units 10/20/2020 1/5/2015   PAP - - Negative for squamous intraepithelial lesion or malignancy  Electronically signed by Gabby Ellington CT (ASCP) on 1/13/2015 at  9:57 AM     PAP (Historical) - NIL -   HPV16 NEG:Negative Negative -   HPV18 NEG:Negative Negative -   HRHPV NEG:Negative Negative -     Reviewed and updated as needed this visit by clinical staff   Tobacco  Allergies  Meds              Reviewed and updated as needed this visit by Provider                     Review of  "Systems   Constitutional: Negative for chills and fever.   HENT: Negative for congestion, ear pain, hearing loss and sore throat.    Eyes: Negative for pain and visual disturbance.   Respiratory: Negative for cough and shortness of breath.    Cardiovascular: Negative for chest pain, palpitations and peripheral edema.   Gastrointestinal: Negative for abdominal pain, constipation, diarrhea, heartburn, hematochezia and nausea.   Breasts:  Negative for tenderness, breast mass and discharge.   Genitourinary: Negative for dysuria, frequency, genital sores, hematuria, pelvic pain, urgency, vaginal bleeding and vaginal discharge.   Musculoskeletal: Negative for arthralgias, joint swelling and myalgias.   Skin: Negative for rash.   Neurological: Negative for dizziness, weakness, headaches and paresthesias.   Psychiatric/Behavioral: Negative for mood changes. The patient is not nervous/anxious.      CONSTITUTIONAL: NEGATIVE for fever, chills, change in weight  INTEGUMENTARY/SKIN: NEGATIVE for worrisome rashes, moles or lesions  EYES: NEGATIVE for vision changes or irritation  ENT: NEGATIVE for ear, mouth and throat problems  RESP: NEGATIVE for significant cough or SOB  BREAST: NEGATIVE for masses, tenderness or discharge  CV: NEGATIVE for chest pain, palpitations or peripheral edema  GI: NEGATIVE for nausea, abdominal pain, heartburn, or change in bowel habits  : NEGATIVE for unusual urinary or vaginal symptoms. No vaginal bleeding.  MUSCULOSKELETAL: NEGATIVE for significant arthralgias or myalgia  NEURO: NEGATIVE for weakness, dizziness or paresthesias  PSYCHIATRIC: NEGATIVE for changes in mood or affect      OBJECTIVE:   /86   Pulse 59   Temp 98.5  F (36.9  C) (Oral)   Resp 16   Ht 1.803 m (5' 11\")   Wt 129.7 kg (286 lb)   SpO2 98%   BMI 39.89 kg/m    Physical Exam  GENERAL: healthy, alert and no distress  NECK: no adenopathy, no asymmetry, masses, or scars and thyroid normal to palpation  RESP: lungs clear " "to auscultation - no rales, rhonchi or wheezes  CV: Sounds regular at this time but previous EKGs have demonstrated atrial fib/flutter with a heart rate in the 60s to 80s, no peripheral edema and peripheral pulses strong  ABDOMEN: obese,  soft, nontenderMS: no gross musculoskeletal defects noted, no edema    Diagnostic Test Results:  Labs reviewed in Epic    ASSESSMENT/PLAN:       ICD-10-CM    1. Morbid obesity (H)  E66.01 Lipid Panel     Basic metabolic panel     semaglutide (OZEMPIC) 2 MG/1.5ML SOPN pen      2. Chronic systolic heart failure (H)  I50.22 Lipid Panel     Basic metabolic panel     Echocardiogram Complete      3. Prediabetes  R73.03 Hemoglobin A1c      4. Chronic systolic congestive heart failure (H)  I50.22 furosemide (LASIX) 40 MG tablet      5. Endometrial hyperplasia with atypia  N85.02       6. Current moderate episode of major depressive disorder without prior episode (H)  F32.1       7. Atrial flutter, unspecified type (H)  I48.92       8. Colon cancer screening  Z12.11 Fecal colorectal cancer screen FIT          Patient has been advised of split billing requirements and indicates understanding: Yes      COUNSELING:  Reviewed preventive health counseling, as reflected in patient instructions       Regular exercise       Healthy diet/nutrition       Immunizations    Vaccinated for: Covid-19, Pneumococcal and TDAP and I recommended that she get the zoster vaccine at a pharmacy.          BMI:   Estimated body mass index is 39.89 kg/m  as calculated from the following:    Height as of this encounter: 1.803 m (5' 11\").    Weight as of this encounter: 129.7 kg (286 lb).   Weight management plan: Discussed healthy diet and exercise guidelines We will start Ozempic and I recommended to specific weight loss apps that she can download on her smart phone.      She reports that she has been smoking cigarettes. She has been smoking an average of .5 packs per day. She has never used smokeless " tobacco.  Nicotine/Tobacco Cessation Plan:   Information offered: Patient not interested at this time      Ari Cruz MD  [facility]

## 2023-01-10 NOTE — PATIENT INSTRUCTIONS
Weeks 1 through 4 0.25 mg once weekly   Weeks 5 through 8 0.5 mg once weekly   Weeks 9 though 12 1 mg once weekly   Weeks 13 through 16 1.7 mg once weekly       [place] apps for weight loss.  [employer] costs $10 per month and [employer] is free.

## 2023-01-10 NOTE — TELEPHONE ENCOUNTER
Confirmed via test claim, pa needed on [facility] pharmacy insurance.  BIN:[id number] PCN:[id number] Group:[id number] ID:[id number]     Please initiate PA.

## 2023-01-11 NOTE — RESULT ENCOUNTER NOTE
The cholesterol is OK.  IT is not high enough that you need to start a medication for it.  Let me know if you have any questions.    JUNIOR White

## 2023-01-12 NOTE — TELEPHONE ENCOUNTER
PRIOR AUTHORIZATION DENIED    Medication: semaglutide (OZEMPIC) 2 MG/1.5ML SOPN pen--DENIED    Denial Date: 1/12/2023    Denial Rational: Patient is not using to treat type 2 diabetes.     Appeal Information:

## 2023-01-12 NOTE — TELEPHONE ENCOUNTER
PA Initiation    Medication: semaglutide (OZEMPIC) 2 MG/1.5ML SOPN pen   Insurance Company: [employer] - Phone [phone] Fax [phone]  Pharmacy Filling the Rx: [id number] [address] [facility] AT [address] [facility]  Filling Pharmacy Phone: [phone]  Filling Pharmacy Fax: [phone]  Start Date: 1/12/2023

## 2023-01-13 NOTE — TELEPHONE ENCOUNTER
Ozempic denied, plan only covers for type 2 diabetes. Please advise on how you would like to proceed. If appealed please provide medical rational.

## 2023-01-16 DIAGNOSIS — E66.01 MORBID OBESITY (H): Primary | ICD-10-CM

## 2023-01-16 RX ORDER — LIRAGLUTIDE 6 MG/ML
0.6 INJECTION SUBCUTANEOUS DAILY
Qty: 3 ML | Refills: 11 | Status: SHIPPED | OUTPATIENT
Start: 2023-01-16 | End: 2024-01-16

## 2023-01-17 ENCOUNTER — TELEPHONE (OUTPATIENT)
Dept: FAMILY MEDICINE | Facility: CLINIC | Age: 63
End: 2023-01-17
Payer: COMMERCIAL

## 2023-01-17 NOTE — TELEPHONE ENCOUNTER
[facility] phone call message- patient requesting a refill:    Full Medication Name: needles for her victoza     Dose: 18 mg /3 ml solution     Pharmacy confirmed as       [id number] - [facility], [place] - [address] [facility] [address] [address] [facility]  [address] [facility] [address]  Phone: [phone] Fax: [phone]  : Yes    Additional Comments: the pt cant take the medication till they get the needles      OK to leave a message on voice mail? Yes       Primary language: English      needed? No    Call taken on January 17, 2023 at 4:03 PM by Liam Pearl

## 2023-01-18 NOTE — TELEPHONE ENCOUNTER
Francisco Colorado,  Please look at previous messages and address this with your doctor and patient.  Thank you.  CONNOR aVzquez

## 2023-01-18 NOTE — TELEPHONE ENCOUNTER
Dr. Cruz,  Please address this message.  If you have some spare time, can you please call a patient?  Thank you.  CONNOR Vazquez

## 2023-01-19 DIAGNOSIS — E66.01 MORBID OBESITY (H): Primary | ICD-10-CM

## 2023-01-19 NOTE — TELEPHONE ENCOUNTER
Patient was frustrated that I had not sent needles to use with the Victoza pen.  I apologize for that and sent the needles to the pharmacy.

## 2023-02-02 ENCOUNTER — TELEPHONE (OUTPATIENT)
Dept: FAMILY MEDICINE | Facility: CLINIC | Age: 63
End: 2023-02-02
Payer: COMMERCIAL

## 2023-02-02 DIAGNOSIS — E66.01 MORBID OBESITY (H): Primary | ICD-10-CM

## 2023-02-02 NOTE — TELEPHONE ENCOUNTER
[facility] phone call message- patient requesting a refill:    Full Medication Name: alcohol prep pads     Dose:     Pharmacy confirmed as       [id number] - [facility], [place] - [address] [facility] [facility] [address]  [address] [facility] [address]  Phone: [phone] Fax: [phone]  : Yes    Additional Comments: for use with her Victoza injections     OK to leave a message on voice mail? Yes    Primary language: English      needed? No    Call taken on February 2, 2023 at 2:30 PM by Liam Pearl

## 2023-02-07 RX ORDER — GLUCOSAMINE HCL/CHONDROITIN SU 500-400 MG
CAPSULE ORAL
Qty: 100 EACH | Refills: 3 | Status: SHIPPED | OUTPATIENT
Start: 2023-02-07 | End: 2024-01-16

## 2023-02-16 DIAGNOSIS — I50.22 CHRONIC SYSTOLIC CONGESTIVE HEART FAILURE (H): ICD-10-CM

## 2023-02-16 RX ORDER — LISINOPRIL 5 MG/1
TABLET ORAL
Qty: 90 TABLET | Refills: 3 | Status: SHIPPED | OUTPATIENT
Start: 2023-02-16 | End: 2024-01-16

## 2023-04-12 DIAGNOSIS — I48.20 CHRONIC ATRIAL FIBRILLATION (H): ICD-10-CM

## 2023-04-13 ENCOUNTER — ONCOLOGY VISIT (OUTPATIENT)
Dept: ONCOLOGY | Facility: CLINIC | Age: 63
End: 2023-04-13
Attending: OBSTETRICS & GYNECOLOGY
Payer: COMMERCIAL

## 2023-04-13 ENCOUNTER — ANCILLARY PROCEDURE (OUTPATIENT)
Dept: ULTRASOUND IMAGING | Facility: CLINIC | Age: 63
End: 2023-04-13
Attending: OBSTETRICS & GYNECOLOGY
Payer: COMMERCIAL

## 2023-04-13 VITALS
SYSTOLIC BLOOD PRESSURE: 112 MMHG | HEART RATE: 82 BPM | BODY MASS INDEX: 39.67 KG/M2 | WEIGHT: 284.4 LBS | DIASTOLIC BLOOD PRESSURE: 80 MMHG | OXYGEN SATURATION: 97 % | TEMPERATURE: 98.8 F

## 2023-04-13 DIAGNOSIS — N85.02 ENDOMETRIAL HYPERPLASIA WITH ATYPIA: Primary | ICD-10-CM

## 2023-04-13 DIAGNOSIS — N85.02 ENDOMETRIAL HYPERPLASIA WITH ATYPIA: ICD-10-CM

## 2023-04-13 PROCEDURE — 76856 US EXAM PELVIC COMPLETE: CPT | Mod: GC | Performed by: RADIOLOGY

## 2023-04-13 PROCEDURE — G0463 HOSPITAL OUTPT CLINIC VISIT: HCPCS | Performed by: OBSTETRICS & GYNECOLOGY

## 2023-04-13 PROCEDURE — 99214 OFFICE O/P EST MOD 30 MIN: CPT | Performed by: OBSTETRICS & GYNECOLOGY

## 2023-04-13 PROCEDURE — 76830 TRANSVAGINAL US NON-OB: CPT | Mod: GC | Performed by: RADIOLOGY

## 2023-04-13 ASSESSMENT — PAIN SCALES - GENERAL: PAINLEVEL: NO PAIN (0)

## 2023-04-13 NOTE — NURSING NOTE
"Oncology Rooming Note    April 13, 2023 4:12 PM   Michelle Shetty is a 62 year old female who presents for:    Chief Complaint   Patient presents with     Oncology Clinic Visit     RTN: Endometrial biopsy     Initial Vitals: /80   Pulse 82   Temp 98.8  F (37.1  C) (Oral)   Resp (!) 97   Wt 129 kg (284 lb 6.4 oz)   BMI 39.67 kg/m   Estimated body mass index is 39.67 kg/m  as calculated from the following:    Height as of 1/10/23: 1.803 m (5' 11\").    Weight as of this encounter: 129 kg (284 lb 6.4 oz). Body surface area is 2.54 meters squared.  No Pain (0) Comment: Data Unavailable   No LMP recorded. Patient is postmenopausal.  Allergies reviewed: Yes  Medications reviewed: Yes    Medications: MEDICATION REFILLS NEEDED TODAY. Provider was notified.  Pharmacy name entered into [employer]:    [id number] - [facility], [place] - [address]  [id number] - [facility], [place] - [address] [facility] [address]    Clinical concerns: none       Radha Burnett              "

## 2023-04-13 NOTE — LETTER
2023         RE: Michelle Shetty  [address]  [place] [address]        Dear Colleague,    Thank you for referring your patient, Michelle Shetty, to the [facility]. Please see a copy of my visit note below.    Gynecologic Oncology Return Visit Note          RE: Michelle Shetty  : 1960  HANG: 2023    CC: Atypical Endometrial Hyperplasia    HPI:  Michelle Shetty is a 62 year old woman with a diagnosis of Atypical Endometrial Hyperplasia, A.Fib, systolic heart failure. She presents for follow up of management of CAH.    Since her last visit she reports doing well, no vaginal bleeding or discharge. She has increased her activity and last 4 lbs in this last month.    Oncology History:  She reports family history of her mother who  after undergoing hysterectomy for some cancer, maybe ovarian cancer. Patient has a lot of concerns about being able to safely undergo surgery.      2020: PMB, anticoagulated for A. Flutteronset of post menopausal bleeding starting in 2020.       Patient is referred by Dr. Cagle with the below noted work up completed.     20 Pelvic US 13.3 x 6.2 8.2 cm, 3 fibroids 3.8, 4.8 , 1.4 cm, endometrial stripe 11mm     10/20/2020 Endometrial Biopsy: Endometrial polyp with rare focus of atypical endometrial hyperplasia     12/10/2020 Mirena IUD placed    21 TVUS:   1. Borderline elevated endometrial thickness measuring 5 mm.  2. Focal heterogeneous fibroid in the posterior body of uterus measuring up to 2.9 cm.  3. Appropriate positioning of intrauterine device.  4. Nonvisualization of the left ovary.     Medical History is notable for  Atrial fibrillation-rate controled on Metoprolol and Lisinopril and is on Xarelto.   Chronic Systolic Heart Failure: Echo 2017  EF 55% - takes furosemide    Depression: Mirtazepine      22 Endometrial Biopsy  Final Diagnosis  Endometrium, biopsy:  - Endocervical tissue with  benign microglandular hyperplasia  - Occasional fragments of inactive endometrium with features consistent with exogenous progestin use  - Negative for hyperplasia or atypia    22 Pelvic US  IMPRESSION:      1. Endometrium measures 6 mm.  2. Uterine fibroid partially visualized.   3. Relatively unchanged positioning of the intrauterine device  compared to prior ultrasound 2021.  4. Left ovary is not demonstrated.    22 Pelvic US  IMPRESSION:   1. Normal endometrial thickness.  2. Mildly decreased size of the intramural fibroid measuring 2.5 cm.  3. IUD remains in the lower uterine segment.  OBGYN history and Health Maintenance:     Last Pap Smear: 10/2020, NILM, HPV Negative  Last Mammogram:  None recent, ordered   Last Colonoscopy:  None recent       Review of Systems:  Systemic: no weight changes; no fever; no chills; no night sweats; no appetite changes  Skin: no rashes, or lesions  Eye: no irritation; no changes in vision  Shan-Laryngeal: no dysphagia; no hoarseness   Pulmonary: no cough; no shortness of breath  Cardiovascular: no chest pain; no palpitations  Gastrointestinal: no diarrhea; no constipation; no abdominal pain; no changes in bowel habits; no blood in stool  Genitourinary: no urinary frequency; no urinary urgency; no dysuria; no pain; no abnormal vaginal discharge; no abnormal vaginal bleeding  Breast: no breast discharge; no breast changes; no breast pain  Musculoskeletal: no myalgias; no arthralgias; no back pain  Psychiatric: no depressed mood; no anxiety    Hematologic: no tender lymph nodes; no noticeable swellings or lumps   Endocrine: no hot flashes; no heat/cold intolerance         Neurological: no tremor; no numbness and tingling; no headaches; no difficulty sleeping      Past Medical History:    Past Medical History:   Diagnosis Date    Anemia     Atrial flutter (H)     Current moderate episode of major depressive disorder without prior episode (H) 1/10/2023    Diastolic  heart failure (H)          Past Surgical History:    Past Surgical History:   Procedure Laterality Date    APPENDECTOMY      BREAST SURGERY      benign tumor left breast    CARDIOVERSION      1/16/15 for r flutter    CHOLECYSTECTOMY      CHOLECYSTECTOMY      FRACTURE SURGERY      ankle    H LAP ABLAT UTERINE FIBROIDS W/INTRAOP US GUIDE      MYOMECTOMY ABDOMINAL APPROACH      OTHER SURGICAL HISTORY      ablationUterine Fibroids         Health Maintenance Due   Topic Date Due    HF ACTION PLAN  Never done    DEPRESSION ACTION PLAN  Never done    COLORECTAL CANCER SCREENING  Never done    ZOSTER IMMUNIZATION (2 of 3) 10/30/2015    YEARLY PREVENTIVE VISIT  01/05/2016    CBC  08/25/2016    LUNG CANCER SCREENING  05/11/2018    ALT  12/04/2018    TSH W/FREE T4 REFLEX  03/21/2020    PHQ-9  06/07/2022       Current Medications:     Current Outpatient Medications   Medication Sig Dispense Refill    alcohol swab prep pads Use to swab area of injection/chrissie as directed. 100 each 3    furosemide (LASIX) 40 MG tablet TAKE 1 TABLET(40 MG) BY MOUTH DAILY AS NEEDED Strength: 40 mg 90 tablet 3    insulin pen needle (32G X 4 MM) 32G X 4 MM miscellaneous Use 1 pen needles daily or as directed. 100 each 3    liraglutide (VICTOZA) 18 MG/3ML solution Inject 0.6 mg Subcutaneous daily 3 mL 11    lisinopril (ZESTRIL) 5 MG tablet TAKE 1 TABLET(5 MG) BY MOUTH DAILY 90 tablet 3    metoprolol succinate ER (TOPROL XL) 100 MG 24 hr tablet TAKE 1 TABLET(100 MG) BY MOUTH DAILY. MAY TAKE ANOTHER DOSE AFTER 4 HOURS IF HEART RATE REMAINS GREATER THAN 120 DAILY 90 tablet 3    mirtazapine (REMERON) 15 MG tablet TAKE 1 TABLET(15 MG) BY MOUTH AT BEDTIME 90 tablet 3    Multiple Vitamins-Minerals (HAIR SKIN AND NAILS FORMULA PO) Take 3 tablets by mouth daily      order for DME Equipment being ordered: pulse oximeter 1 Device 0    semaglutide (OZEMPIC) 2 MG/1.5ML SOPN pen Inject 0.25 mg Subcutaneous every 7 days 1.5 mL 11    XARELTO ANTICOAGULANT 20 MG TABS  tablet TAKE 1 TABLET(20 MG) BY MOUTH DAILY WITH DINNER 90 tablet 3    zinc sulfate (ZINCATE) 220 (50 Zn) MG capsule Take 220 mg by mouth daily           Allergies:        Allergies   Allergen Reactions    Amoxicillin Anaphylaxis     Pt doesn't remember this and thinks that its okay    Codeine         Social History:     Social History     Tobacco Use    Smoking status: Some Days     Packs/day: 0.50     Types: Cigarettes    Smokeless tobacco: Never   Vaping Use    Vaping status: Not on file   Substance Use Topics    Alcohol use: Yes     Alcohol/week: 1.0 standard drink of alcohol     Types: 1 Glasses of wine per week     Comment: Daily       History   Drug Use No         Family History:     The patient's family history is notable for:    Family History   Problem Relation Age of Onset    Diabetes Mother     Hypertension Mother     Ovarian Cancer Mother          age 79 after hysterectomy for cancer    Arrhythmia Father     Cerebrovascular Disease Father         2017    Myocardial Infarction Father 60    Arrhythmia Brother     Breast Cancer Other     No Known Problems Maternal Grandmother     No Known Problems Maternal Grandfather     No Known Problems Paternal Grandmother     No Known Problems Paternal Grandfather     No Known Problems Sister     No Known Problems Son     No Known Problems Daughter     No Known Problems Maternal Half-Brother     No Known Problems Maternal Half-Sister     No Known Problems Paternal Half-Brother     No Known Problems Paternal Half-Sister     No Known Problems Niece     No Known Problems Nephew     No Known Problems Cousin     Heart Disease Father     Atrial Flutter Father     Atrial Flutter Brother     Asthma Child        Physical Exam:   /80   Pulse 82   Temp 98.8  F (37.1  C) (Oral)   Wt 129 kg (284 lb 6.4 oz)   SpO2 97%   BMI 39.67 kg/m      General Appearance: healthy and alert, no distress     Psychiatric: appropriate mood and affect. Mentation appears normal,  affect normal/bright, judgement and insight intact, normal speech and appearance well-groomed             Assessment and Plan:  Michelle Shetty is a 60 year old woman with history of atypical endometrial hyperplasia arising in a polyp diagnosed in 10/2020. She has been reluctant to undergo surgery due to family responsibilities and concern about morbidity of surgery. She currently is under treatment with Mirena IUD, reports no vaginal bleeding.     I have reviewed the most recent imaging and the IUD is effective with now normal endometrial stripe. She is asymptomatic and no biopsy was done today.    Follow up in 1 year with pelvic US and will obtain Pap smear at that time.        Rossana Aguirre M.D., MPH,  F.A.C.O.G.  Professor  Department of Ob/Gyn and Women's Health  Division of Gynecologic Oncology  [facility] [place]  [phone]      Time: total time spent today, April 13, 2023, including preparation, review of outside records, face to face counseling, and documentation was 25 minutes.     CC  Patient Care Team:  Ari Cruz MD as PCP - General (Family Practice)  Rossana Aguirre MD as MD (Gynecologic Oncology)  Stella Cagle MD (Inactive) as Referring Physician (OB/Gyn)  Rossana Aguirre MD as Assigned Cancer Care Provider  Ari Cruz MD as Assigned PCP  SELF, REFERRED      Again, thank you for allowing me to participate in the care of your patient.        Sincerely,        Rossana Aguirre MD

## 2023-04-13 NOTE — PROGRESS NOTES
Gynecologic Oncology Return Visit Note          RE: Michelle Shetty  : 1960  HANG: 2023    CC: Atypical Endometrial Hyperplasia    HPI:  Michelle Shetty is a 62 year old woman with a diagnosis of Atypical Endometrial Hyperplasia, A.Fib, systolic heart failure. She presents for follow up of management of CAH.    Since her last visit she reports doing well, no vaginal bleeding or discharge. She has increased her activity and last 4 lbs in this last month.    Oncology History:  She reports family history of her mother who  after undergoing hysterectomy for some cancer, maybe ovarian cancer. Patient has a lot of concerns about being able to safely undergo surgery.      2020: PMB, anticoagulated for A. Flutteronset of post menopausal bleeding starting in 2020.       Patient is referred by Dr. Cagle with the below noted work up completed.     20 Pelvic US 13.3 x 6.2 8.2 cm, 3 fibroids 3.8, 4.8 , 1.4 cm, endometrial stripe 11mm     10/20/2020 Endometrial Biopsy: Endometrial polyp with rare focus of atypical endometrial hyperplasia     12/10/2020 Mirena IUD placed    21 TVUS:   1. Borderline elevated endometrial thickness measuring 5 mm.  2. Focal heterogeneous fibroid in the posterior body of uterus measuring up to 2.9 cm.  3. Appropriate positioning of intrauterine device.  4. Nonvisualization of the left ovary.     Medical History is notable for  Atrial fibrillation-rate controled on Metoprolol and Lisinopril and is on Xarelto.   Chronic Systolic Heart Failure: Echo 2017  EF 55% - takes furosemide    Depression: Mirtazepine      22 Endometrial Biopsy  Final Diagnosis  Endometrium, biopsy:  - Endocervical tissue with benign microglandular hyperplasia  - Occasional fragments of inactive endometrium with features consistent with exogenous progestin use  - Negative for hyperplasia or atypia    22 Pelvic US  IMPRESSION:      1. Endometrium measures 6 mm.  2. Uterine fibroid  partially visualized.   3. Relatively unchanged positioning of the intrauterine device  compared to prior ultrasound 2021.  4. Left ovary is not demonstrated.    22 Pelvic US  IMPRESSION:   1. Normal endometrial thickness.  2. Mildly decreased size of the intramural fibroid measuring 2.5 cm.  3. IUD remains in the lower uterine segment.  OBGYN history and Health Maintenance:     Last Pap Smear: 10/2020, NILM, HPV Negative  Last Mammogram:  None recent, ordered   Last Colonoscopy:  None recent       Review of Systems:  Systemic: no weight changes; no fever; no chills; no night sweats; no appetite changes  Skin: no rashes, or lesions  Eye: no irritation; no changes in vision  Shan-Laryngeal: no dysphagia; no hoarseness   Pulmonary: no cough; no shortness of breath  Cardiovascular: no chest pain; no palpitations  Gastrointestinal: no diarrhea; no constipation; no abdominal pain; no changes in bowel habits; no blood in stool  Genitourinary: no urinary frequency; no urinary urgency; no dysuria; no pain; no abnormal vaginal discharge; no abnormal vaginal bleeding  Breast: no breast discharge; no breast changes; no breast pain  Musculoskeletal: no myalgias; no arthralgias; no back pain  Psychiatric: no depressed mood; no anxiety    Hematologic: no tender lymph nodes; no noticeable swellings or lumps   Endocrine: no hot flashes; no heat/cold intolerance         Neurological: no tremor; no numbness and tingling; no headaches; no difficulty sleeping      Past Medical History:    Past Medical History:   Diagnosis Date     Anemia      Atrial flutter (H)      Current moderate episode of major depressive disorder without prior episode (H) 1/10/2023     Diastolic heart failure (H)          Past Surgical History:    Past Surgical History:   Procedure Laterality Date     APPENDECTOMY       BREAST SURGERY      benign tumor left breast     CARDIOVERSION      1/16/15 for r flutter     CHOLECYSTECTOMY        CHOLECYSTECTOMY       FRACTURE SURGERY      ankle     H LAP ABLAT UTERINE FIBROIDS W/INTRAOP US GUIDE       MYOMECTOMY ABDOMINAL APPROACH       OTHER SURGICAL HISTORY      ablationUterine Fibroids         Health Maintenance Due   Topic Date Due     HF ACTION PLAN  Never done     DEPRESSION ACTION PLAN  Never done     COLORECTAL CANCER SCREENING  Never done     ZOSTER IMMUNIZATION (2 of 3) 10/30/2015     YEARLY PREVENTIVE VISIT  01/05/2016     CBC  08/25/2016     LUNG CANCER SCREENING  05/11/2018     ALT  12/04/2018     TSH W/FREE T4 REFLEX  03/21/2020     PHQ-9  06/07/2022       Current Medications:     Current Outpatient Medications   Medication Sig Dispense Refill     alcohol swab prep pads Use to swab area of injection/chrissie as directed. 100 each 3     furosemide (LASIX) 40 MG tablet TAKE 1 TABLET(40 MG) BY MOUTH DAILY AS NEEDED Strength: 40 mg 90 tablet 3     insulin pen needle (32G X 4 MM) 32G X 4 MM miscellaneous Use 1 pen needles daily or as directed. 100 each 3     liraglutide (VICTOZA) 18 MG/3ML solution Inject 0.6 mg Subcutaneous daily 3 mL 11     lisinopril (ZESTRIL) 5 MG tablet TAKE 1 TABLET(5 MG) BY MOUTH DAILY 90 tablet 3     metoprolol succinate ER (TOPROL XL) 100 MG 24 hr tablet TAKE 1 TABLET(100 MG) BY MOUTH DAILY. MAY TAKE ANOTHER DOSE AFTER 4 HOURS IF HEART RATE REMAINS GREATER THAN 120 DAILY 90 tablet 3     mirtazapine (REMERON) 15 MG tablet TAKE 1 TABLET(15 MG) BY MOUTH AT BEDTIME 90 tablet 3     Multiple Vitamins-Minerals (HAIR SKIN AND NAILS FORMULA PO) Take 3 tablets by mouth daily       order for DME Equipment being ordered: pulse oximeter 1 Device 0     semaglutide (OZEMPIC) 2 MG/1.5ML SOPN pen Inject 0.25 mg Subcutaneous every 7 days 1.5 mL 11     XARELTO ANTICOAGULANT 20 MG TABS tablet TAKE 1 TABLET(20 MG) BY MOUTH DAILY WITH DINNER 90 tablet 3     zinc sulfate (ZINCATE) 220 (50 Zn) MG capsule Take 220 mg by mouth daily           Allergies:        Allergies   Allergen Reactions      Amoxicillin Anaphylaxis     Pt doesn't remember this and thinks that its okay     Codeine         Social History:     Social History     Tobacco Use     Smoking status: Some Days     Packs/day: 0.50     Types: Cigarettes     Smokeless tobacco: Never   Vaping Use     Vaping status: Not on file   Substance Use Topics     Alcohol use: Yes     Alcohol/week: 1.0 standard drink of alcohol     Types: 1 Glasses of wine per week     Comment: Daily       History   Drug Use No         Family History:     The patient's family history is notable for:    Family History   Problem Relation Age of Onset     Diabetes Mother      Hypertension Mother      Ovarian Cancer Mother          age 79 after hysterectomy for cancer     Arrhythmia Father      Cerebrovascular Disease Father         2017     Myocardial Infarction Father 60     Arrhythmia Brother      Breast Cancer Other      No Known Problems Maternal Grandmother      No Known Problems Maternal Grandfather      No Known Problems Paternal Grandmother      No Known Problems Paternal Grandfather      No Known Problems Sister      No Known Problems Son      No Known Problems Daughter      No Known Problems Maternal Half-Brother      No Known Problems Maternal Half-Sister      No Known Problems Paternal Half-Brother      No Known Problems Paternal Half-Sister      No Known Problems Niece      No Known Problems Nephew      No Known Problems Cousin      Heart Disease Father      Atrial Flutter Father      Atrial Flutter Brother      Asthma Child        Physical Exam:   /80   Pulse 82   Temp 98.8  F (37.1  C) (Oral)   Wt 129 kg (284 lb 6.4 oz)   SpO2 97%   BMI 39.67 kg/m      General Appearance: healthy and alert, no distress     Psychiatric: appropriate mood and affect. Mentation appears normal, affect normal/bright, judgement and insight intact, normal speech and appearance well-groomed             Assessment and Plan:  Michelle Shetty is a 60 year old woman with  history of atypical endometrial hyperplasia arising in a polyp diagnosed in 10/2020. She has been reluctant to undergo surgery due to family responsibilities and concern about morbidity of surgery. She currently is under treatment with Mirena IUD, reports no vaginal bleeding.     I have reviewed the most recent imaging and the IUD is effective with now normal endometrial stripe. She is asymptomatic and no biopsy was done today.    Follow up in 1 year with pelvic US and will obtain Pap smear at that time.        Rossana Aguirre M.D., MPH,  F.A.C.O.G.  Professor  Department of Ob/Gyn and Women's Health  Division of Gynecologic Oncology  [facility]/Commissioner [place]  [phone]      Time: total time spent today, April 13, 2023, including preparation, review of outside records, face to face counseling, and documentation was 25 minutes.     CC  Patient Care Team:  Ari Cruz MD as PCP - General (Family Practice)  Rossana Aguirre MD as MD (Gynecologic Oncology)  Stella Cagle MD (Inactive) as Referring Physician (OB/Gyn)  Rossana Aguirre MD as Assigned Cancer Care Provider  Ari Cruz MD as Assigned PCP  SELF, REFERRED

## 2023-04-15 ENCOUNTER — HEALTH MAINTENANCE LETTER (OUTPATIENT)
Age: 63
End: 2023-04-15

## 2023-04-17 RX ORDER — RIVAROXABAN 20 MG/1
TABLET, FILM COATED ORAL
Qty: 90 TABLET | Refills: 3 | Status: SHIPPED | OUTPATIENT
Start: 2023-04-17 | End: 2024-04-25

## 2023-08-24 DIAGNOSIS — I50.22 CHRONIC SYSTOLIC CONGESTIVE HEART FAILURE (H): ICD-10-CM

## 2023-08-25 RX ORDER — METOPROLOL SUCCINATE 100 MG/1
TABLET, EXTENDED RELEASE ORAL
Qty: 90 TABLET | Refills: 3 | Status: SHIPPED | OUTPATIENT
Start: 2023-08-25 | End: 2024-01-16

## 2023-10-24 ENCOUNTER — ALLIED HEALTH/NURSE VISIT (OUTPATIENT)
Dept: FAMILY MEDICINE | Facility: CLINIC | Age: 63
End: 2023-10-24
Payer: COMMERCIAL

## 2023-10-24 VITALS
SYSTOLIC BLOOD PRESSURE: 122 MMHG | HEART RATE: 82 BPM | OXYGEN SATURATION: 97 % | TEMPERATURE: 97.8 F | DIASTOLIC BLOOD PRESSURE: 76 MMHG | RESPIRATION RATE: 20 BRPM

## 2023-10-24 DIAGNOSIS — Z23 ENCOUNTER FOR IMMUNIZATION: Primary | ICD-10-CM

## 2023-10-24 PROCEDURE — 90471 IMMUNIZATION ADMIN: CPT

## 2023-10-24 PROCEDURE — 91320 SARSCV2 VAC 30MCG TRS-SUC IM: CPT

## 2023-10-24 PROCEDURE — 90480 ADMN SARSCOV2 VAC 1/ONLY CMP: CPT

## 2023-10-24 PROCEDURE — 90686 IIV4 VACC NO PRSV 0.5 ML IM: CPT

## 2023-10-24 PROCEDURE — 99207 PR NO CHARGE NURSE ONLY: CPT

## 2023-10-24 NOTE — PROGRESS NOTES
Prior to immunization administration, verified patients identity using patient s name and date of birth. Please see Immunization Activity for additional information.     Screening Questionnaire for Adult Immunization    Are you sick today?   No   Do you have allergies to medications, food, a vaccine component or latex?   No   Have you ever had a serious reaction after receiving a vaccination?   No   Do you have a long-term health problem with heart, lung, kidney, or metabolic disease (e.g., diabetes), asthma, a blood disorder, no spleen, complement component deficiency, a cochlear implant, or a spinal fluid leak?  Are you on long-term aspirin therapy?   No   Do you have cancer, leukemia, HIV/AIDS, or any other immune system problem?   No   Do you have a parent, brother, or sister with an immune system problem?   No   In the past 3 months, have you taken medications that affect  your immune system, such as prednisone, other steroids, or anticancer drugs; drugs for the treatment of rheumatoid arthritis, Crohn s disease, or psoriasis; or have you had radiation treatments?   No   Have you had a seizure, or a brain or other nervous system problem?   No   During the past year, have you received a transfusion of blood or blood    products, or been given immune (gamma) globulin or antiviral drug?   No   For women: Are you pregnant or is there a chance you could become       pregnant during the next month?   No   Have you received any vaccinations in the past 4 weeks?   No     Immunization questionnaire answers were all negative.    I have reviewed the following standing orders:   This patient is due and qualifies for the Covid-19 vaccine.     Click here for COVID-19 Standing Order    I have reviewed the vaccines inclusion and exclusion criteria; No concerns regarding eligibility.     Patient instructed to remain in clinic for 15 minutes afterwards, and to report any adverse reactions.     Screening performed by Dereje Mitchell  on 10/24/2023 at 1:48 PM.

## 2023-10-31 DIAGNOSIS — F32.1 CURRENT MODERATE EPISODE OF MAJOR DEPRESSIVE DISORDER WITHOUT PRIOR EPISODE (H): ICD-10-CM

## 2023-11-01 RX ORDER — MIRTAZAPINE 15 MG/1
TABLET, FILM COATED ORAL
Qty: 90 TABLET | Refills: 3 | Status: SHIPPED | OUTPATIENT
Start: 2023-11-01 | End: 2024-01-16

## 2024-01-15 DIAGNOSIS — R73.03 PREDIABETES: ICD-10-CM

## 2024-01-15 DIAGNOSIS — E66.01 MORBID OBESITY (H): Primary | ICD-10-CM

## 2024-01-15 DIAGNOSIS — I48.20 CHRONIC ATRIAL FIBRILLATION (H): ICD-10-CM

## 2024-01-15 DIAGNOSIS — I50.22 CHRONIC SYSTOLIC HEART FAILURE (H): ICD-10-CM

## 2024-01-15 NOTE — PROGRESS NOTES
Assessment & Plan     The longitudinal plan of care for  HFrEF   was addressed during this visit.  Due to the added complexity and care, I will continue to support Michelle Shetty in the subsequent management of this condition and with the ongoing continuity of care of this condition.    Prediabetes  Check labs today.  Patient needs a follow-up echocardiogram.  - Hemoglobin A1c  - Albumin Random Urine Quantitative with Creat Ratio  - ALT; Future  - ALT (Today); Future    Chronic systolic heart failure (H)  - Lipid Panel  - Basic metabolic panel    Special screening for malignant neoplasms, colon  She is amenable to doing the Cologuard so we will order that for her.        - COLOGUARD(EXACT SCIENCES); Future    Post-menopausal  I recommended DEXA scanning but she is not wanting to do that at this point.  Increase the Victoza from 0.6 up to 1.2 and then 1.8.  She is already had 10 pounds of weight loss.    Morbid obesity (H)  Increase the Victoza.  She has already had 10 pounds of weight loss.  - alcohol swab prep pads; Use to swab area of injection/chrissie as directed.  - insulin pen needle (32G X 4 MM) 32G X 4 MM miscellaneous; Use 1 pen needles daily or as directed.  - liraglutide (VICTOZA) 18 MG/3ML solution; 1.2 mg a day for a month and then increase to 1.8 mg a day after that.    Chronic systolic congestive heart failure (H)  - furosemide (LASIX) 40 MG tablet; TAKE 1 TABLET(40 MG) BY MOUTH DAILY AS NEEDED Strength: 40 mg  - lisinopril (ZESTRIL) 5 MG tablet; Take 1 tablet (5 mg) by mouth daily  - CBC with Platelets; Future  - metoprolol succinate ER (TOPROL XL) 100 MG 24 hr tablet; Take 1.5 tablets (150 mg) by mouth daily TAKE 1 TABLET(100 MG) BY MOUTH DAILY. MAY TAKE ANOTHER DOSE AFTER 4 HOURS IF HEART RATE REMAINS GREATER THAN 120 DAILY    Chronic atrial fibrillation (H)  On Xarelto and on metoprolol for rate control    Current moderate episode of major depressive disorder without prior episode  (H)      Post-cholecystectomy syndrome  She has fairly loose stools every time she eats.  She states she occasionally will not make it to the bathroom in time because of having a bowel movement.  - cholestyramine (QUESTRAN) 4 g packet; Take 1 packet (4 g) by mouth 3 times daily (with meals)    Ordering of each unique test  Prescription drug management  40 minutes spent by me on the date of the encounter doing chart review, review of test results, interpretation of tests, patient visit, and documentation        Nicotine/Tobacco Cessation:  She reports that she has been smoking cigarettes. She has been smoking an average of 0.5 packs per day. She has never used smokeless tobacco.  Nicotine/Tobacco Cessation Plan:   Self help information given to patient.  She plans to quit today and states she does not need meds to help.      MEDICATIONS:        - Increase metoprolol to 150 mg.       - Continue other medications without change    Work on weight loss  Regular exercise  See Patient Instructions    Return in about 3 months (around 4/16/2024).    Ari Cruz MD  [facility]    Mark Martinez is a 63 year old, presenting for the following health issues:  No chief complaint on file.        1/16/2024     1:27 PM   Additional Questions   Roomed by Mao   Accompanied by self       Healthy Habits:     Getting at least 3 servings of Calcium per day:  Yes    Bi-annual eye exam:  Yes    Dental care twice a year:  NO    Sleep apnea or symptoms of sleep apnea:  Daytime drowsiness    Diet:  Regular (no restrictions)    Frequency of exercise:  None    Taking medications regularly:  Yes    Medication side effects:  None    Additional concerns today:  No       From a screening perspective she had a fibroadenoma diagnosed last year on needle biopsy of the breast.  She has a repeat mammogram scheduled for April.  She is due for colorectal cancer screening.  She is a smoker and would be eligible for  low-dose CT scanning of the lung.  She is up-to-date on her Pap smear.  She would benefit from a DEXA scan.    This patient's comes in once a year and typically not more often because of insurance and financial costs.  She has HFrEF noted in her chart but the last echocardiogram from 7 years ago showed an EF of 50%.  She has atrial fibrillation and is on Xarelto.  She has obesity and prediabetes.  History of abnormal Pap smear: She has endometrial hyperplasia and follows with a gynecologic oncologist. She has the Mirena IUD for treatment of this.  Hysterectomy has been recommended to the patient and she declined that. She has been doing well with the IUD.     She reports urinary frequency but a small amount of urine.    Her best friend  on [place] day which was a major shock and loss.    She is down to 5 cigs a day and plans to stop.    Last Comprehensive Metabolic Panel:  Lab Results   Component Value Date     01/10/2023    POTASSIUM 4.2 01/10/2023    CHLORIDE 102 01/10/2023    CO2 27 01/10/2023    ANIONGAP 11 01/10/2023     (H) 01/10/2023    BUN 15.2 01/10/2023    CR 1.03 (H) 01/10/2023    GFRESTIMATED 61 01/10/2023    JOSEMANUEL 9.2 01/10/2023        Hemoglobin A1C   Date Value Ref Range Status   01/10/2023 6.2 (H) 0.0 - 5.6 % Final     Comment:     Normal <5.7%   Prediabetes 5.7-6.4%    Diabetes 6.5% or higher     Note: Adopted from ADA consensus guidelines.   2021 5.7 (H) 0.0 - 5.6 % Final     Comment:     Normal <5.7%   Prediabetes 5.7-6.4%    Diabetes 6.5% or higher     Note: Adopted from ADA consensus guidelines.   2017 6.0 4.2 - 6.1 % Final   2015 5.7 4.1 - 5.7 % Final        Recent Labs   Lab Test 01/10/23  1437 21  1310   CHOL 216* 187   HDL 54 60   * 99   TRIG 168* 141        BP Readings from Last 3 Encounters:   10/24/23 122/76   23 112/80   01/10/23 120/86       Current Outpatient Medications   Medication    furosemide (LASIX) 40 MG tablet    liraglutide  "(VICTOZA) 18 MG/3ML solution    lisinopril (ZESTRIL) 5 MG tablet    metoprolol succinate ER (TOPROL XL) 100 MG 24 hr tablet    XARELTO ANTICOAGULANT 20 MG TABS tablet    alcohol swab prep pads    insulin pen needle (32G X 4 MM) 32G X 4 MM miscellaneous    mirtazapine (REMERON) 15 MG tablet    Multiple Vitamins-Minerals (HAIR SKIN AND NAILS FORMULA PO)    order for DME    semaglutide (OZEMPIC) 2 MG/1.5ML SOPN pen    zinc sulfate (ZINCATE) 220 (50 Zn) MG capsule     No current facility-administered medications for this visit.          REVIEW OF SYSTEMS  Answers submitted by the patient for this visit:  Annual Preventive Visit (Submitted on 1/16/2024)  Chief Complaint: Annual Exam:  abdominal pain: No  Blood in stool: No  Blood in urine: No  chest pain: No  chills: No  congestion: No  constipation: No  cough: No  diarrhea: No  dizziness: No  ear pain: No  eye pain: No  nervous/anxious: No  fever: No  frequency: Yes  genital sores: No  headaches: No  hearing loss: No  heartburn: No  arthralgias: No  joint swelling: No  peripheral edema: No  mood changes: No  myalgias: No  nausea: No  dysuria: No  palpitations: Yes  Skin sensation changes: No  sore throat: No  urgency: No  rash: No  shortness of breath: No  visual disturbance: No  weakness: No  pelvic pain: No  vaginal bleeding: No  vaginal discharge: No  tenderness: No  breast mass: No  breast discharge: No    Objective    /74   Pulse 93   Temp 98  F (36.7  C) (Oral)   Resp 16   Ht 1.803 m (5' 11\")   Wt 125.2 kg (276 lb)   SpO2 96%   BMI 38.49 kg/m    Body mass index is 38.49 kg/m .  Physical Exam   Gen:  Well nourished and in NAD  Neck: supple without lymphadenopathy  CV:  RRR  - no murmurs, rubs, or gallups,   Pulm:  CTAB, no wheezes/rales/rhonchi, good air entry   ABD: soft, nontender, no masses, no rebound, BS intact throughout  Extrem: no cyanosis, edema or clubbing  Psych: Euthymic             "

## 2024-01-16 ENCOUNTER — OFFICE VISIT (OUTPATIENT)
Dept: FAMILY MEDICINE | Facility: CLINIC | Age: 64
End: 2024-01-16
Payer: COMMERCIAL

## 2024-01-16 VITALS
BODY MASS INDEX: 38.64 KG/M2 | TEMPERATURE: 98 F | DIASTOLIC BLOOD PRESSURE: 74 MMHG | RESPIRATION RATE: 16 BRPM | WEIGHT: 276 LBS | HEIGHT: 71 IN | SYSTOLIC BLOOD PRESSURE: 110 MMHG | HEART RATE: 93 BPM | OXYGEN SATURATION: 96 %

## 2024-01-16 DIAGNOSIS — K91.5 POST-CHOLECYSTECTOMY SYNDROME: ICD-10-CM

## 2024-01-16 DIAGNOSIS — Z12.11 SPECIAL SCREENING FOR MALIGNANT NEOPLASMS, COLON: Primary | ICD-10-CM

## 2024-01-16 DIAGNOSIS — I48.20 CHRONIC ATRIAL FIBRILLATION (H): ICD-10-CM

## 2024-01-16 DIAGNOSIS — E66.01 MORBID OBESITY (H): ICD-10-CM

## 2024-01-16 DIAGNOSIS — I50.22 CHRONIC SYSTOLIC HEART FAILURE (H): ICD-10-CM

## 2024-01-16 DIAGNOSIS — D50.9 MICROCYTIC ANEMIA: ICD-10-CM

## 2024-01-16 DIAGNOSIS — Z78.0 POST-MENOPAUSAL: ICD-10-CM

## 2024-01-16 DIAGNOSIS — R73.03 PREDIABETES: ICD-10-CM

## 2024-01-16 DIAGNOSIS — I50.22 CHRONIC SYSTOLIC CONGESTIVE HEART FAILURE (H): ICD-10-CM

## 2024-01-16 DIAGNOSIS — F32.1 CURRENT MODERATE EPISODE OF MAJOR DEPRESSIVE DISORDER WITHOUT PRIOR EPISODE (H): ICD-10-CM

## 2024-01-16 LAB
ALT SERPL W P-5'-P-CCNC: 22 U/L (ref 0–50)
ANION GAP SERPL CALCULATED.3IONS-SCNC: 10 MMOL/L (ref 7–15)
BUN SERPL-MCNC: 12 MG/DL (ref 8–23)
CALCIUM SERPL-MCNC: 9.8 MG/DL (ref 8.8–10.2)
CHLORIDE SERPL-SCNC: 100 MMOL/L (ref 98–107)
CHOLEST SERPL-MCNC: 209 MG/DL
CREAT SERPL-MCNC: 0.88 MG/DL (ref 0.51–0.95)
DEPRECATED HCO3 PLAS-SCNC: 30 MMOL/L (ref 22–29)
EGFRCR SERPLBLD CKD-EPI 2021: 73 ML/MIN/1.73M2
ERYTHROCYTE [DISTWIDTH] IN BLOOD BY AUTOMATED COUNT: 18.3 % (ref 10–15)
FASTING STATUS PATIENT QL REPORTED: ABNORMAL
GLUCOSE SERPL-MCNC: 112 MG/DL (ref 70–99)
HBA1C MFR BLD: 6.1 % (ref 0–5.6)
HCT VFR BLD AUTO: 42.4 % (ref 35–47)
HDLC SERPL-MCNC: 64 MG/DL
HGB BLD-MCNC: 13.3 G/DL (ref 11.7–15.7)
LDLC SERPL CALC-MCNC: 106 MG/DL
MCH RBC QN AUTO: 21.8 PG (ref 26.5–33)
MCHC RBC AUTO-ENTMCNC: 31.4 G/DL (ref 31.5–36.5)
MCV RBC AUTO: 70 FL (ref 78–100)
NONHDLC SERPL-MCNC: 145 MG/DL
PLATELET # BLD AUTO: 283 10E3/UL (ref 150–450)
POTASSIUM SERPL-SCNC: 4.1 MMOL/L (ref 3.4–5.3)
RBC # BLD AUTO: 6.09 10E6/UL (ref 3.8–5.2)
SODIUM SERPL-SCNC: 140 MMOL/L (ref 135–145)
TRIGL SERPL-MCNC: 193 MG/DL
WBC # BLD AUTO: 10.1 10E3/UL (ref 4–11)

## 2024-01-16 PROCEDURE — 85660 RBC SICKLE CELL TEST: CPT | Mod: 90 | Performed by: FAMILY MEDICINE

## 2024-01-16 PROCEDURE — 80048 BASIC METABOLIC PNL TOTAL CA: CPT | Performed by: FAMILY MEDICINE

## 2024-01-16 PROCEDURE — 85027 COMPLETE CBC AUTOMATED: CPT | Performed by: FAMILY MEDICINE

## 2024-01-16 PROCEDURE — 36415 COLL VENOUS BLD VENIPUNCTURE: CPT | Performed by: FAMILY MEDICINE

## 2024-01-16 PROCEDURE — 83020 HEMOGLOBIN ELECTROPHORESIS: CPT | Mod: 90 | Performed by: FAMILY MEDICINE

## 2024-01-16 PROCEDURE — 82728 ASSAY OF FERRITIN: CPT | Performed by: FAMILY MEDICINE

## 2024-01-16 PROCEDURE — 84466 ASSAY OF TRANSFERRIN: CPT | Performed by: FAMILY MEDICINE

## 2024-01-16 PROCEDURE — 99000 SPECIMEN HANDLING OFFICE-LAB: CPT | Performed by: FAMILY MEDICINE

## 2024-01-16 PROCEDURE — 83540 ASSAY OF IRON: CPT | Performed by: FAMILY MEDICINE

## 2024-01-16 PROCEDURE — 80061 LIPID PANEL: CPT | Performed by: FAMILY MEDICINE

## 2024-01-16 PROCEDURE — 83021 HEMOGLOBIN CHROMOTOGRAPHY: CPT | Mod: 90 | Performed by: FAMILY MEDICINE

## 2024-01-16 PROCEDURE — 99215 OFFICE O/P EST HI 40 MIN: CPT | Performed by: FAMILY MEDICINE

## 2024-01-16 PROCEDURE — 83036 HEMOGLOBIN GLYCOSYLATED A1C: CPT | Performed by: FAMILY MEDICINE

## 2024-01-16 PROCEDURE — 84460 ALANINE AMINO (ALT) (SGPT): CPT | Performed by: FAMILY MEDICINE

## 2024-01-16 RX ORDER — CHOLESTYRAMINE 4 G/9G
1 POWDER, FOR SUSPENSION ORAL
Qty: 60 PACKET | Refills: 11 | Status: SHIPPED | OUTPATIENT
Start: 2024-01-16

## 2024-01-16 RX ORDER — FUROSEMIDE 40 MG
TABLET ORAL
Qty: 90 TABLET | Refills: 3 | Status: SHIPPED | OUTPATIENT
Start: 2024-01-16

## 2024-01-16 RX ORDER — GLUCOSAMINE HCL/CHONDROITIN SU 500-400 MG
CAPSULE ORAL
Qty: 100 EACH | Refills: 3 | Status: SHIPPED | OUTPATIENT
Start: 2024-01-16

## 2024-01-16 RX ORDER — METOPROLOL SUCCINATE 100 MG/1
150 TABLET, EXTENDED RELEASE ORAL DAILY
Qty: 135 TABLET | Refills: 3 | Status: SHIPPED | OUTPATIENT
Start: 2024-01-16

## 2024-01-16 RX ORDER — LIRAGLUTIDE 6 MG/ML
INJECTION SUBCUTANEOUS
Qty: 6 ML | Refills: 11 | Status: SHIPPED | OUTPATIENT
Start: 2024-01-16 | End: 2024-04-26

## 2024-01-16 RX ORDER — LISINOPRIL 5 MG/1
5 TABLET ORAL DAILY
Qty: 90 TABLET | Refills: 3 | Status: SHIPPED | OUTPATIENT
Start: 2024-01-16

## 2024-01-16 ASSESSMENT — ENCOUNTER SYMPTOMS
FEVER: 0
HEMATURIA: 0
HEARTBURN: 0
EYE PAIN: 0
WEAKNESS: 0
BREAST MASS: 0
HEADACHES: 0
CONSTIPATION: 0
COUGH: 0
DYSURIA: 0
MYALGIAS: 0
NERVOUS/ANXIOUS: 0
JOINT SWELLING: 0
PALPITATIONS: 1
ABDOMINAL PAIN: 0
NAUSEA: 0
CHILLS: 0
FREQUENCY: 1
DIZZINESS: 0
SHORTNESS OF BREATH: 0
DIARRHEA: 0
SORE THROAT: 0
HEMATOCHEZIA: 0
ARTHRALGIAS: 0
PARESTHESIAS: 0

## 2024-01-16 ASSESSMENT — PATIENT HEALTH QUESTIONNAIRE - PHQ9: SUM OF ALL RESPONSES TO PHQ QUESTIONS 1-9: 15

## 2024-01-16 NOTE — PATIENT INSTRUCTIONS
We increased the Victoza today.      We increased the metoprolol to 1.5 pills a day.    I will let you know the blood test results in [facility].    They will send the Cologuard to your house to check for colon cancer and polyps.

## 2024-01-17 DIAGNOSIS — D50.9 MICROCYTIC ANEMIA: Primary | ICD-10-CM

## 2024-01-17 LAB
FERRITIN SERPL-MCNC: 61 NG/ML (ref 11–328)
IRON BINDING CAPACITY (ROCHE): 377 UG/DL (ref 240–430)
IRON SATN MFR SERPL: 32 % (ref 15–46)
IRON SERPL-MCNC: 121 UG/DL (ref 37–145)
TRANSFERRIN SERPL-MCNC: 329 MG/DL (ref 200–360)

## 2024-01-18 ENCOUNTER — TELEPHONE (OUTPATIENT)
Dept: FAMILY MEDICINE | Facility: CLINIC | Age: 64
End: 2024-01-18

## 2024-01-21 LAB
HGB A1 MFR BLD: 97.3 %
HGB A2 MFR BLD: 2.3 %
HGB C MFR BLD: 0 %
HGB E MFR BLD: 0 %
HGB F MFR BLD: 0.4 %
HGB FRACT BLD ELPH-IMP: NORMAL
HGB OTHER MFR BLD: 0 %
HGB S BLD QL SOLY: NORMAL
HGB S MFR BLD: 0 %
PATH INTERP BLD-IMP: NORMAL

## 2024-01-23 ENCOUNTER — ORDERS ONLY (AUTO-RELEASED) (OUTPATIENT)
Dept: FAMILY MEDICINE | Facility: CLINIC | Age: 64
End: 2024-01-23

## 2024-01-23 DIAGNOSIS — Z12.11 SPECIAL SCREENING FOR MALIGNANT NEOPLASMS, COLON: ICD-10-CM

## 2024-04-11 ENCOUNTER — ANCILLARY PROCEDURE (OUTPATIENT)
Dept: ULTRASOUND IMAGING | Facility: CLINIC | Age: 64
End: 2024-04-11
Attending: OBSTETRICS & GYNECOLOGY
Payer: COMMERCIAL

## 2024-04-11 DIAGNOSIS — N85.02 ENDOMETRIAL HYPERPLASIA WITH ATYPIA: ICD-10-CM

## 2024-04-11 PROCEDURE — 76856 US EXAM PELVIC COMPLETE: CPT | Mod: GC | Performed by: STUDENT IN AN ORGANIZED HEALTH CARE EDUCATION/TRAINING PROGRAM

## 2024-04-11 PROCEDURE — 76830 TRANSVAGINAL US NON-OB: CPT | Mod: GC | Performed by: STUDENT IN AN ORGANIZED HEALTH CARE EDUCATION/TRAINING PROGRAM

## 2024-04-14 NOTE — PROGRESS NOTES
Gynecologic Oncology Return Visit Note        RE: Mihcelle Shetty  : 1960  HANG:  24      CC: Atypical Endometrial Hyperplasia    HPI:  Michelle Shetty is a 62 year old woman with a diagnosis of Atypical Endometrial Hyperplasia, A.Fib, systolic heart failure. She presents for follow up of management.    Since her last visit she reports doing well, no vaginal bleeding or discharge. On Victoza for 1 year and came off in January due to insurance coverage. Worked well for her and she though she loss 15 lbs.     Oncology History:  She reports family history of her mother who  after undergoing hysterectomy for some cancer, maybe ovarian cancer. Patient has a lot of concerns about being able to safely undergo surgery.      2020: PMB, anticoagulated for A. flutter. Onset of post menopausal bleeding starting in 2020.       Patient is referred by Dr. Cagle with the below noted work up completed.     20 Pelvic US 13.3 x 6.2 8.2 cm, 3 fibroids 3.8, 4.8 , 1.4 cm, endometrial stripe 11mm     10/20/2020 Endometrial Biopsy: Endometrial polyp with rare focus of atypical endometrial hyperplasia     12/10/2020 Mirena IUD placed    21 TVUS:   1. Borderline elevated endometrial thickness measuring 5 mm.  2. Focal heterogeneous fibroid in the posterior body of uterus measuring up to 2.9 cm.  3. Appropriate positioning of intrauterine device.  4. Nonvisualization of the left ovary.     Medical History is notable for  Atrial fibrillation-rate controled on Metoprolol and Lisinopril and is on Xarelto.   Chronic Systolic Heart Failure: Echo 2017  EF 55% - takes furosemide    Depression: Mirtazepine      22 Endometrial Biopsy  Final Diagnosis  Endometrium, biopsy:  - Endocervical tissue with benign microglandular hyperplasia  - Occasional fragments of inactive endometrium with features consistent with exogenous progestin use  - Negative for hyperplasia or atypia    22 Pelvic US  IMPRESSION:       1. Endometrium measures 6 mm.  2. Uterine fibroid partially visualized.   3. Relatively unchanged positioning of the intrauterine device  compared to prior ultrasound 2021.  4. Left ovary is not demonstrated.    22 Pelvic US  IMPRESSION:   1. Normal endometrial thickness.  2. Mildly decreased size of the intramural fibroid measuring 2.5 cm.  3. IUD remains in the lower uterine segment.    23 Pelvic US  IMPRESSION:   1. Normal endometrial thickness, 3 mm.  2. Mildly decreased size of the intramural fibroid measuring 2.5 cm.  3. IUD remains in the lower uterine segment.    24 Pelvic US  IMPRESSION:    1.  Left ovary is not visualized.  2.  Endometrial thickness of 0.6 cm. Consider endometrial biopsy if  clinically indicated.  3.  A few uterine fibroids measuring up to 2.7 cm.  4.  intrauterine device in the lower uterine segment      OBGYN history and Health Maintenance:     Last Pap Smear: 10/2020, NILM, HPV Negative  Last Mammogram:  None recent, ordered   Last Colonoscopy:  None recent       Past Medical History:    Past Medical History:   Diagnosis Date    Anemia     Atrial flutter (H)     Current moderate episode of major depressive disorder without prior episode (H) 1/10/2023    Diastolic heart failure (H)          Past Surgical History:    Past Surgical History:   Procedure Laterality Date    APPENDECTOMY      BREAST SURGERY      benign tumor left breast    CARDIOVERSION      1/16/15 for r flutter    CHOLECYSTECTOMY      CHOLECYSTECTOMY      FRACTURE SURGERY      ankle    H LAP ABLAT UTERINE FIBROIDS W/INTRAOP US GUIDE      MYOMECTOMY ABDOMINAL APPROACH      OTHER SURGICAL HISTORY      ablationUterine Fibroids         Health Maintenance Due   Topic Date Due    HF ACTION PLAN  Never done    DEPRESSION ACTION PLAN  Never done    COLORECTAL CANCER SCREENING  Never done    ZOSTER IMMUNIZATION (2 of 3) 10/30/2015    YEARLY PREVENTIVE VISIT  2016    LUNG CANCER SCREENING  2018     RSV VACCINE (Pregnancy & 60+) (1 - 1-dose 60+ series) Never done       Current Medications:     Current Outpatient Medications   Medication Sig Dispense Refill    alcohol swab prep pads Use to swab area of injection/chrissie as directed. 100 each 3    cholestyramine (QUESTRAN) 4 g packet Take 1 packet (4 g) by mouth 3 times daily (with meals) 60 packet 11    furosemide (LASIX) 40 MG tablet TAKE 1 TABLET(40 MG) BY MOUTH DAILY AS NEEDED Strength: 40 mg 90 tablet 3    insulin pen needle (32G X 4 MM) 32G X 4 MM miscellaneous Use 1 pen needles daily or as directed. 100 each 3    liraglutide (VICTOZA) 18 MG/3ML solution 1.2 mg a day for a month and then increase to 1.8 mg a day after that. 6 mL 11    lisinopril (ZESTRIL) 5 MG tablet Take 1 tablet (5 mg) by mouth daily 90 tablet 3    metoprolol succinate ER (TOPROL XL) 100 MG 24 hr tablet Take 1.5 tablets (150 mg) by mouth daily TAKE 1 TABLET(100 MG) BY MOUTH DAILY. MAY TAKE ANOTHER DOSE AFTER 4 HOURS IF HEART RATE REMAINS GREATER THAN 120 DAILY 135 tablet 3    Multiple Vitamins-Minerals (HAIR SKIN AND NAILS FORMULA PO) Take 3 tablets by mouth daily      order for DME Equipment being ordered: pulse oximeter 1 Device 0    XARELTO ANTICOAGULANT 20 MG TABS tablet TAKE 1 TABLET(20 MG) BY MOUTH DAILY WITH DINNER 90 tablet 3    zinc sulfate (ZINCATE) 220 (50 Zn) MG capsule Take 220 mg by mouth daily           Allergies:        Allergies   Allergen Reactions    Amoxicillin Anaphylaxis     Pt doesn't remember this and thinks that its okay    Codeine         Social History:     Social History     Tobacco Use    Smoking status: Some Days     Current packs/day: 0.50     Types: Cigarettes    Smokeless tobacco: Never   Substance Use Topics    Alcohol use: Yes     Alcohol/week: 1.0 standard drink of alcohol     Types: 1 Glasses of wine per week     Comment: Daily       History   Drug Use No         Family History:     The patient's family history is notable for:    Family History    Problem Relation Age of Onset    Diabetes Mother     Hypertension Mother     Ovarian Cancer Mother          age 79 after hysterectomy for cancer    Arrhythmia Father     Cerebrovascular Disease Father         2017    Myocardial Infarction Father 60    Arrhythmia Brother     Breast Cancer Other     No Known Problems Maternal Grandmother     No Known Problems Maternal Grandfather     No Known Problems Paternal Grandmother     No Known Problems Paternal Grandfather     No Known Problems Sister     No Known Problems Son     No Known Problems Daughter     No Known Problems Maternal Half-Brother     No Known Problems Maternal Half-Sister     No Known Problems Paternal Half-Brother     No Known Problems Paternal Half-Sister     No Known Problems Niece     No Known Problems Nephew     No Known Problems Cousin     Heart Disease Father     Atrial Flutter Father     Atrial Flutter Brother     Asthma Child        Physical Exam:   /87 (BP Location: Left arm, Patient Position: Sitting, Cuff Size: Adult Large)   Pulse 77   Resp 16   Wt 125.1 kg (275 lb 14.4 oz)   SpO2 97%   BMI 38.48 kg/m      General Appearance: healthy and alert, no distress  Psychiatric: Appropriate mood and affect. Mentation appears normal, affect normal/bright, judgement and insight intact, normal speech and appearance well-groomed             Assessment and Plan:  Michelle Shetty is a 60 year old woman with history of atypical endometrial hyperplasia arising in a polyp diagnosed in 10/2020. She is prefers not to undergo surgery due to family responsibilities and concern about morbidity of surgery. She currently is under treatment with Mirena IUD, reports no vaginal bleeding.     IUD appears to be in lower uterine segment and her endometrial stripe has increased in thickness to 6 mm in comparison to last year. Given these findings, recommended doing a dilation and curettage under ultrasound guidance with Mirena IUD replacement.  Pending final pathology may require additional treatment.    Currently on anticoagulation with Xarelto for Atrial Flutter- okay to hold dose day before surgery and during day of surgery    Can complete pre-op visit with PAC clinic    Ching Barrera, MS3    Rossana Aguirre M.D., MPH,  F.A.C.O.G.  Division of Gynecologic Oncology  [facility] [facility]  [phone]      I saw and evaluated the patient with the medical student who acted as scribe. I reviewed and edited the note.    Time: total time spent today, 25 , including preparation, review of outside records, face to face counseling, and documentation.   CC  Patient Care Team:  Ari Cruz MD as PCP - General (Family Practice)  Rossana Aguirre MD as MD (Gynecologic Oncology)  Stella Cagle MD (Inactive) as Referring Physician (OB/Gyn)  Rossana Aguirre MD as Assigned Cancer Care Provider  Ari Cruz MD as Assigned PCP  SELF, REFERRED

## 2024-04-18 ENCOUNTER — ONCOLOGY VISIT (OUTPATIENT)
Dept: ONCOLOGY | Facility: CLINIC | Age: 64
End: 2024-04-18
Attending: OBSTETRICS & GYNECOLOGY
Payer: COMMERCIAL

## 2024-04-18 ENCOUNTER — ANCILLARY PROCEDURE (OUTPATIENT)
Dept: MAMMOGRAPHY | Facility: CLINIC | Age: 64
End: 2024-04-18
Attending: FAMILY MEDICINE
Payer: COMMERCIAL

## 2024-04-18 VITALS
RESPIRATION RATE: 16 BRPM | HEART RATE: 77 BPM | DIASTOLIC BLOOD PRESSURE: 87 MMHG | WEIGHT: 275.9 LBS | BODY MASS INDEX: 38.48 KG/M2 | SYSTOLIC BLOOD PRESSURE: 136 MMHG | OXYGEN SATURATION: 97 %

## 2024-04-18 DIAGNOSIS — R93.89 THICKENED ENDOMETRIUM: Primary | ICD-10-CM

## 2024-04-18 DIAGNOSIS — N85.02 ENDOMETRIAL HYPERPLASIA WITH ATYPIA: ICD-10-CM

## 2024-04-18 DIAGNOSIS — Z12.31 VISIT FOR SCREENING MAMMOGRAM: ICD-10-CM

## 2024-04-18 PROCEDURE — 99213 OFFICE O/P EST LOW 20 MIN: CPT | Performed by: OBSTETRICS & GYNECOLOGY

## 2024-04-18 PROCEDURE — 77067 SCR MAMMO BI INCL CAD: CPT | Mod: GC | Performed by: STUDENT IN AN ORGANIZED HEALTH CARE EDUCATION/TRAINING PROGRAM

## 2024-04-18 PROCEDURE — 77063 BREAST TOMOSYNTHESIS BI: CPT | Mod: GC | Performed by: STUDENT IN AN ORGANIZED HEALTH CARE EDUCATION/TRAINING PROGRAM

## 2024-04-18 RX ORDER — CLINDAMYCIN PHOSPHATE 900 MG/50ML
900 INJECTION, SOLUTION INTRAVENOUS
Status: CANCELLED | OUTPATIENT
Start: 2024-04-18

## 2024-04-18 RX ORDER — CLINDAMYCIN PHOSPHATE 900 MG/50ML
900 INJECTION, SOLUTION INTRAVENOUS SEE ADMIN INSTRUCTIONS
Status: CANCELLED | OUTPATIENT
Start: 2024-04-18

## 2024-04-18 ASSESSMENT — PAIN SCALES - GENERAL: PAINLEVEL: NO PAIN (0)

## 2024-04-18 NOTE — NURSING NOTE
"Oncology Rooming Note    April 18, 2024 3:02 PM   Michelle Shetty is a 63 year old female who presents for:    Chief Complaint   Patient presents with    Oncology Clinic Visit     Endometrial Hyperplasia     Initial Vitals: /87 (BP Location: Left arm, Patient Position: Sitting, Cuff Size: Adult Large)   Pulse 77   Resp 16   Wt 125.1 kg (275 lb 14.4 oz)   SpO2 97%   BMI 38.48 kg/m   Estimated body mass index is 38.48 kg/m  as calculated from the following:    Height as of 1/16/24: 1.803 m (5' 11\").    Weight as of this encounter: 125.1 kg (275 lb 14.4 oz). Body surface area is 2.5 meters squared.  No Pain (0) Comment: Data Unavailable   No LMP recorded. Patient is postmenopausal.  Allergies reviewed: Yes  Medications reviewed: Yes    Medications: Medication refills not needed today.  Pharmacy name entered into [employer]:    [id number] - [facility] [place] - [address].  [id number] - [place] [facility] [place] [place] - [address] [facility] [address]    Frailty Screening:   Is the patient here for a new oncology consult visit in cancer care? 2. No      Clinical concerns: None       Eli Miguel LPN  4/18/2024                "

## 2024-04-18 NOTE — LETTER
2024         RE: Michelle Shetty  [address]  [place] [address]        Dear Colleague,    Thank you for referring your patient, Michelle Shetty, to the [facility]. Please see a copy of my visit note below.    Gynecologic Oncology Return Visit Note        RE: Michelle Shetty  : 1960  HANG:  24      CC: Atypical Endometrial Hyperplasia    HPI:  Michelle Shetty is a 62 year old woman with a diagnosis of Atypical Endometrial Hyperplasia, A.Fib, systolic heart failure. She presents for follow up of management.    Since her last visit she reports doing well, no vaginal bleeding or discharge. On Victoza for 1 year and came off in January due to insurance coverage. Worked well for her and she though she loss 15 lbs.     Oncology History:  She reports family history of her mother who  after undergoing hysterectomy for some cancer, maybe ovarian cancer. Patient has a lot of concerns about being able to safely undergo surgery.      2020: PMB, anticoagulated for A. flutter. Onset of post menopausal bleeding starting in 2020.       Patient is referred by Dr. Cagle with the below noted work up completed.     20 Pelvic US 13.3 x 6.2 8.2 cm, 3 fibroids 3.8, 4.8 , 1.4 cm, endometrial stripe 11mm     10/20/2020 Endometrial Biopsy: Endometrial polyp with rare focus of atypical endometrial hyperplasia     12/10/2020 Mirena IUD placed    21 TVUS:   1. Borderline elevated endometrial thickness measuring 5 mm.  2. Focal heterogeneous fibroid in the posterior body of uterus measuring up to 2.9 cm.  3. Appropriate positioning of intrauterine device.  4. Nonvisualization of the left ovary.     Medical History is notable for  Atrial fibrillation-rate controled on Metoprolol and Lisinopril and is on Xarelto.   Chronic Systolic Heart Failure: Echo 2017  EF 55% - takes furosemide    Depression: Mirtazepine      22 Endometrial Biopsy  Final  Diagnosis  Endometrium, biopsy:  - Endocervical tissue with benign microglandular hyperplasia  - Occasional fragments of inactive endometrium with features consistent with exogenous progestin use  - Negative for hyperplasia or atypia    22 Pelvic US  IMPRESSION:      1. Endometrium measures 6 mm.  2. Uterine fibroid partially visualized.   3. Relatively unchanged positioning of the intrauterine device  compared to prior ultrasound 2021.  4. Left ovary is not demonstrated.    22 Pelvic US  IMPRESSION:   1. Normal endometrial thickness.  2. Mildly decreased size of the intramural fibroid measuring 2.5 cm.  3. IUD remains in the lower uterine segment.    23 Pelvic US  IMPRESSION:   1. Normal endometrial thickness, 3 mm.  2. Mildly decreased size of the intramural fibroid measuring 2.5 cm.  3. IUD remains in the lower uterine segment.    24 Pelvic US  IMPRESSION:    1.  Left ovary is not visualized.  2.  Endometrial thickness of 0.6 cm. Consider endometrial biopsy if  clinically indicated.  3.  A few uterine fibroids measuring up to 2.7 cm.  4.  intrauterine device in the lower uterine segment      OBGYN history and Health Maintenance:     Last Pap Smear: 10/2020, NILM, HPV Negative  Last Mammogram:  None recent, ordered   Last Colonoscopy:  None recent       Past Medical History:    Past Medical History:   Diagnosis Date    Anemia     Atrial flutter (H)     Current moderate episode of major depressive disorder without prior episode (H) 1/10/2023    Diastolic heart failure (H)          Past Surgical History:    Past Surgical History:   Procedure Laterality Date    APPENDECTOMY      BREAST SURGERY      benign tumor left breast    CARDIOVERSION      1/16/15 for r flutter    CHOLECYSTECTOMY      CHOLECYSTECTOMY      FRACTURE SURGERY      ankle    H LAP ABLAT UTERINE FIBROIDS W/INTRAOP US GUIDE      MYOMECTOMY ABDOMINAL APPROACH      OTHER SURGICAL HISTORY      ablationUterine Fibroids          Health Maintenance Due   Topic Date Due    HF ACTION PLAN  Never done    DEPRESSION ACTION PLAN  Never done    COLORECTAL CANCER SCREENING  Never done    ZOSTER IMMUNIZATION (2 of 3) 10/30/2015    YEARLY PREVENTIVE VISIT  01/05/2016    LUNG CANCER SCREENING  05/11/2018    RSV VACCINE (Pregnancy & 60+) (1 - 1-dose 60+ series) Never done       Current Medications:     Current Outpatient Medications   Medication Sig Dispense Refill    alcohol swab prep pads Use to swab area of injection/chrissie as directed. 100 each 3    cholestyramine (QUESTRAN) 4 g packet Take 1 packet (4 g) by mouth 3 times daily (with meals) 60 packet 11    furosemide (LASIX) 40 MG tablet TAKE 1 TABLET(40 MG) BY MOUTH DAILY AS NEEDED Strength: 40 mg 90 tablet 3    insulin pen needle (32G X 4 MM) 32G X 4 MM miscellaneous Use 1 pen needles daily or as directed. 100 each 3    liraglutide (VICTOZA) 18 MG/3ML solution 1.2 mg a day for a month and then increase to 1.8 mg a day after that. 6 mL 11    lisinopril (ZESTRIL) 5 MG tablet Take 1 tablet (5 mg) by mouth daily 90 tablet 3    metoprolol succinate ER (TOPROL XL) 100 MG 24 hr tablet Take 1.5 tablets (150 mg) by mouth daily TAKE 1 TABLET(100 MG) BY MOUTH DAILY. MAY TAKE ANOTHER DOSE AFTER 4 HOURS IF HEART RATE REMAINS GREATER THAN 120 DAILY 135 tablet 3    Multiple Vitamins-Minerals (HAIR SKIN AND NAILS FORMULA PO) Take 3 tablets by mouth daily      order for DME Equipment being ordered: pulse oximeter 1 Device 0    XARELTO ANTICOAGULANT 20 MG TABS tablet TAKE 1 TABLET(20 MG) BY MOUTH DAILY WITH DINNER 90 tablet 3    zinc sulfate (ZINCATE) 220 (50 Zn) MG capsule Take 220 mg by mouth daily           Allergies:        Allergies   Allergen Reactions    Amoxicillin Anaphylaxis     Pt doesn't remember this and thinks that its okay    Codeine         Social History:     Social History     Tobacco Use    Smoking status: Some Days     Current packs/day: 0.50     Types: Cigarettes    Smokeless tobacco:  Never   Substance Use Topics    Alcohol use: Yes     Alcohol/week: 1.0 standard drink of alcohol     Types: 1 Glasses of wine per week     Comment: Daily       History   Drug Use No         Family History:     The patient's family history is notable for:    Family History   Problem Relation Age of Onset    Diabetes Mother     Hypertension Mother     Ovarian Cancer Mother          age 79 after hysterectomy for cancer    Arrhythmia Father     Cerebrovascular Disease Father         2017    Myocardial Infarction Father 60    Arrhythmia Brother     Breast Cancer Other     No Known Problems Maternal Grandmother     No Known Problems Maternal Grandfather     No Known Problems Paternal Grandmother     No Known Problems Paternal Grandfather     No Known Problems Sister     No Known Problems Son     No Known Problems Daughter     No Known Problems Maternal Half-Brother     No Known Problems Maternal Half-Sister     No Known Problems Paternal Half-Brother     No Known Problems Paternal Half-Sister     No Known Problems Niece     No Known Problems Nephew     No Known Problems Cousin     Heart Disease Father     Atrial Flutter Father     Atrial Flutter Brother     Asthma Child        Physical Exam:   /87 (BP Location: Left arm, Patient Position: Sitting, Cuff Size: Adult Large)   Pulse 77   Resp 16   Wt 125.1 kg (275 lb 14.4 oz)   SpO2 97%   BMI 38.48 kg/m      General Appearance: healthy and alert, no distress  Psychiatric: Appropriate mood and affect. Mentation appears normal, affect normal/bright, judgement and insight intact, normal speech and appearance well-groomed             Assessment and Plan:  Michelle Shetty is a 60 year old woman with history of atypical endometrial hyperplasia arising in a polyp diagnosed in 10/2020. She is prefers not to undergo surgery due to family responsibilities and concern about morbidity of surgery. She currently is under treatment with Mirena IUD, reports no  vaginal bleeding.     IUD appears to be in lower uterine segment and her endometrial stripe has increased in thickness to 6 mm in comparison to last year. Given these findings, recommended doing a dilation and curettage under ultrasound guidance with Mirena IUD replacement. Pending final pathology may require additional treatment.    Currently on anticoagulation with Xarelto for Atrial Flutter- okay to hold dose day before surgery and during day of surgery    Can complete pre-op visit with PAC clinic    Ching Barrera, MS3    Rossana Aguirre M.D., MPH,  F.A.C.O.G.  Division of Gynecologic Oncology  [facility]/[employer] [place]  [phone]      I saw and evaluated the patient with the medical student who acted as scribe. I reviewed and edited the note.    Time: total time spent today, 25 , including preparation, review of outside records, face to face counseling, and documentation.   CC  Patient Care Team:  Ari Cruz MD as PCP - General (Family Practice)  Rossana Aguirre MD as MD (Gynecologic Oncology)

## 2024-04-19 ENCOUNTER — TELEPHONE (OUTPATIENT)
Dept: ONCOLOGY | Facility: CLINIC | Age: 64
End: 2024-04-19
Payer: COMMERCIAL

## 2024-04-19 NOTE — TELEPHONE ENCOUNTER
Spoke with the patient and was able to confirm all scheduled information.     Patient is schedule for surgery with: Dr. Aguirre    Surgery Date: 5/3     Location: [facility]    H&P: to be completed by PAC clinic - scheduled on 4/26     Post-op:  5/23, in-person visit,      Patient will receive surgery arrival and start time from PAC. Patient is aware that if times change after they see PAC, they will receive a call from the pre-admission nurses 1-2 days prior to surgery with updated arrival time and NPO instructions.    Patient aware times are subject to change up until day before surgery.     Patient questions/concerns:  Patient has a lot of concerns about her co-pays and how much out of pocket she will need for this surgery. Writer offered to connect her to a financial counselor, however patient was not interested and just want Dr. Aguirre to know that she knows that she needs this procedure, but she can not afford a huge bill.      Surgery packet was sent via [employer] on 4/19      Tammy Sotelo on 4/19/2024 at 3:27 PM

## 2024-04-21 DIAGNOSIS — I48.20 CHRONIC ATRIAL FIBRILLATION (H): ICD-10-CM

## 2024-04-22 NOTE — TELEPHONE ENCOUNTER
FUTURE VISIT INFORMATION      SURGERY INFORMATION:  Date: 5/3/24  Location: [facility]  Surgeon:  Rossana Aguirre MD   Anesthesia Type:  mac  Procedure: DILATION AND CURETTAGE, UTERUS, USING SUCTION, WITH ULTRASOUND GUIDANCE, MIRENA IUD PLACEMENT   Consult: ov 24    RECORDS REQUESTED FROM:       Primary Care Provider:   Ari Cruz MD- [facility]     Pertinent Medical History: chronic systolic heart failure, chronic atrial fibrillation     Most recent EKG+ [id number]    Most recent ECHO: 1/10/23

## 2024-04-22 NOTE — TELEPHONE ENCOUNTER
Name from pharmacy: XARELTO 20MG TABLETS         Will file in chart as: XARELTO ANTICOAGULANT 20 MG TABS tablet    Sig: TAKE 1 TABLET(20 MG) BY MOUTH DAILY WITH DINNER    Disp: 90 tablet    Refills: 3 (Pharmacy requested: Not specified)    Start: 4/21/2024    Class: E-Prescribe    Non-formulary For: Chronic atrial fibrillation (H)    Last ordered: 1 year ago (4/17/2023) by Ari Cruz MD    Last refill: 3/12/2024    Rx #: [id number]    Anticoagulant Agents [id number]/21/2024 12:27 PM   Protocol Details Creatinine Clearance greater than 50 ml/min on file in past 3 mos    Serum creatinine less than or equal to 1.4 on file in past 3 mos    Normal Platelets on file in past 12 months    Medication is active on med list    GFR on file in the past 12 months and most recent GFR is normal    Recent (6 mo) or future (90 days) visit within the authorizing provider's specialty    Medication indicated for associated diagnosis    Patient is 18 years of age or older    No active pregnancy on record    No positive pregnancy test within past 12 months

## 2024-04-25 RX ORDER — RIVAROXABAN 20 MG/1
TABLET, FILM COATED ORAL
Qty: 90 TABLET | Refills: 3 | Status: SHIPPED | OUTPATIENT
Start: 2024-04-25

## 2024-04-26 ENCOUNTER — PRE VISIT (OUTPATIENT)
Dept: SURGERY | Facility: CLINIC | Age: 64
End: 2024-04-26

## 2024-04-26 ENCOUNTER — ANESTHESIA EVENT (OUTPATIENT)
Dept: SURGERY | Facility: CLINIC | Age: 64
End: 2024-04-26
Payer: COMMERCIAL

## 2024-04-26 ENCOUNTER — OFFICE VISIT (OUTPATIENT)
Dept: SURGERY | Facility: CLINIC | Age: 64
End: 2024-04-26
Payer: COMMERCIAL

## 2024-04-26 VITALS
TEMPERATURE: 97.8 F | DIASTOLIC BLOOD PRESSURE: 74 MMHG | HEIGHT: 70 IN | OXYGEN SATURATION: 98 % | RESPIRATION RATE: 16 BRPM | WEIGHT: 269 LBS | BODY MASS INDEX: 38.51 KG/M2 | HEART RATE: 79 BPM | SYSTOLIC BLOOD PRESSURE: 115 MMHG

## 2024-04-26 DIAGNOSIS — R93.89 THICKENED ENDOMETRIUM: ICD-10-CM

## 2024-04-26 DIAGNOSIS — Z01.818 PREOP EXAMINATION: Primary | ICD-10-CM

## 2024-04-26 PROCEDURE — 99204 OFFICE O/P NEW MOD 45 MIN: CPT | Performed by: CLINICAL NURSE SPECIALIST

## 2024-04-26 ASSESSMENT — LIFESTYLE VARIABLES: TOBACCO_USE: 1

## 2024-04-26 ASSESSMENT — ENCOUNTER SYMPTOMS
SEIZURES: 0
DYSRHYTHMIAS: 1

## 2024-04-26 ASSESSMENT — PAIN SCALES - GENERAL: PAINLEVEL: NO PAIN (0)

## 2024-04-26 NOTE — H&P
Pre-Operative H & P     CC:  Preoperative exam to assess for increased cardiopulmonary risk while undergoing surgery and anesthesia.    Date of Encounter: 4/26/2024  Primary Care Physician:  Ari Cruz     Reason for visit:   Encounter Diagnoses   Name Primary?    Preop examination Yes    Thickened endometrium        HPI  Michelle Shetty is a 63 year old female who presents for pre-operative H & P in preparation for  Procedure Information       Case: [id number] Date/Time: 05/03/24 1230    Procedures:       DILATION AND CURETTAGE, UTERUS, USING SUCTION, WITH ULTRASOUND GUIDANCE, (Abdomen)      MIRENA IUD PLACEMENT (Abdomen)    Anesthesia type: MAC    Diagnosis: Thickened endometrium [R93.89]    Pre-op diagnosis: Thickened endometrium [R93.89]    Location:  OR 03 /  OR    Providers: Rossana Aguirre MD          History is obtained from the patient and chart review    Patient who has been recently followed by Dr. Aguirre, and team with history of atypical endometrial hyperplasia arising in a polyp diagnosed in 10/2020. She prefers not to undergo surgery due to family responsibilities and concern about morbidity of surgery. She currently is under treatment with Mirena IUD, reports no vaginal bleeding.     During her last follow-up visit it was discussed that her IUD appears to be in lower uterine segment and her endometrial stripe has increased in thickness to 6 mm in comparison to last year. Given these findings, it was recommended for her to undergo dilation and curettage under ultrasound guidance with Mirena IUD replacement. She was counseled that pending pathology she may require additional treatment.     Patient's history is otherwise significant for obesity, continued smoking, chronic systolic heart failure, chronic atrial fibrillation, atrial flutter, s/p cardioversion in 2015, chronic anticoagulation, prediabetes, and depression. She had been on Victoza and was losing weight but then insurance lapsed and  she was unable to afford continued use.      Hx of abnormal bleeding or anti-platelet use: Anticoagulated on Xarelto    Menstrual history: No LMP recorded. Patient is postmenopausal.     Past Medical History  Past Medical History:   Diagnosis Date    Anemia     Atrial fibrillation     Atrial flutter (H)     Current moderate episode of major depressive disorder without prior episode (H) 01/10/2023    Diastolic heart failure (H)     Endometrial hyperplasia with atypia        Past Surgical History  Past Surgical History:   Procedure Laterality Date    APPENDECTOMY      BREAST SURGERY      benign tumor left breast    CARDIOVERSION      1/16/15 for a flutter    CHOLECYSTECTOMY      CHOLECYSTECTOMY      FRACTURE SURGERY      ankle    H LAP ABLAT UTERINE FIBROIDS W/INTRAOP US GUIDE      MYOMECTOMY ABDOMINAL APPROACH      OTHER SURGICAL HISTORY      ablationUterine Fibroids       Prior to Admission Medications  Current Outpatient Medications   Medication Sig Dispense Refill    furosemide (LASIX) 40 MG tablet TAKE 1 TABLET(40 MG) BY MOUTH DAILY AS NEEDED Strength: 40 mg (Patient taking differently: Take 40 mg by mouth at bedtime TAKE 1 TABLET(40 MG) BY MOUTH DAILY AS NEEDED Strength: 40 mg) 90 tablet 3    lisinopril (ZESTRIL) 5 MG tablet Take 1 tablet (5 mg) by mouth daily (Patient taking differently: Take 5 mg by mouth at bedtime) 90 tablet 3    metoprolol succinate ER (TOPROL XL) 100 MG 24 hr tablet Take 1.5 tablets (150 mg) by mouth daily TAKE 1 TABLET(100 MG) BY MOUTH DAILY. MAY TAKE ANOTHER DOSE AFTER 4 HOURS IF HEART RATE REMAINS GREATER THAN 120 DAILY (Patient taking differently: Take 150 mg by mouth at bedtime TAKE 1 TABLET(100 MG) BY MOUTH DAILY. MAY TAKE ANOTHER DOSE AFTER 4 HOURS IF HEART RATE REMAINS GREATER THAN 120 DAILY) 135 tablet 3    Multiple Vitamins-Minerals (HAIR SKIN AND NAILS FORMULA PO) Take 2 tablets by mouth at bedtime      XARELTO ANTICOAGULANT 20 MG TABS tablet TAKE 1 TABLET(20 MG) BY MOUTH DAILY  WITH DINNER (Patient taking differently: Take 20 mg by mouth daily (with dinner)) 90 tablet 3    alcohol swab prep pads Use to swab area of injection/chrissie as directed. 100 each 3    cholestyramine (QUESTRAN) 4 g packet Take 1 packet (4 g) by mouth 3 times daily (with meals) 60 packet 11    insulin pen needle (32G X 4 MM) 32G X 4 MM miscellaneous Use 1 pen needles daily or as directed. 100 each 3    order for DME Equipment being ordered: pulse oximeter 1 Device 0       Allergies  Allergies   Allergen Reactions    Amoxicillin Anaphylaxis     Pt doesn't remember this and thinks that its okay    Codeine        Social History  Social History     Socioeconomic History    Marital status:      Spouse name: Not on file    Number of children: Not on file    Years of education: Not on file    Highest education level: Not on file   Occupational History    Not on file   Tobacco Use    Smoking status: Some Days     Current packs/day: 0.25     Types: Cigarettes    Smokeless tobacco: Never    Tobacco comments:     5-6 cigarettes daily    Substance and Sexual Activity    Alcohol use: Yes     Alcohol/week: 1.0 standard drink of alcohol     Types: 1 Glasses of wine per week     Comment: 1 drink a day    Drug use: Yes     Comment: Once in while will smoke Marijuana    Sexual activity: Yes     Partners: Male     Birth control/protection: None   Other Topics Concern    Not on file   Social History Narrative    Not on file     Social Determinants of Health     Financial Resource Strain: Low Risk  (1/16/2024)    Financial Resource Strain     Within the past 12 months, have you or your family members you live with been unable to get utilities (heat, electricity) when it was really needed?: No   Food Insecurity: Low Risk  (1/16/2024)    Food Insecurity     Within the past 12 months, did you worry that your food would run out before you got money to buy more?: No     Within the past 12 months, did the food you bought just not last and  you didn t have money to get more?: No   Transportation Needs: Low Risk  (2024)    Transportation Needs     Within the past 12 months, has lack of transportation kept you from medical appointments, getting your medicines, non-medical meetings or appointments, work, or from getting things that you need?: No   Physical Activity: Not on file   Stress: Not on file   Social Connections: Not on file   Interpersonal Safety: Low Risk  (2024)    Interpersonal Safety     Do you feel physically and emotionally safe where you currently live?: Yes     Within the past 12 months, have you been hit, slapped, kicked or otherwise physically hurt by someone?: No     Within the past 12 months, have you been humiliated or emotionally abused in other ways by your partner or ex-partner?: No   Housing Stability: Low Risk  (2024)    Housing Stability     Do you have housing? : Yes     Are you worried about losing your housing?: No       Family History  Family History   Problem Relation Age of Onset    Diabetes Mother     Hypertension Mother     Ovarian Cancer Mother          age 79 after hysterectomy for cancer    Arrhythmia Father         atrial fibrillation    Cerebrovascular Disease Father         2017    Myocardial Infarction Father 60    Heart Disease Father     Atrial Flutter Father     No Known Problems Sister     Arrhythmia Brother     Atrial Flutter Brother     No Known Problems Maternal Grandmother     No Known Problems Maternal Grandfather     No Known Problems Paternal Grandmother     No Known Problems Paternal Grandfather     No Known Problems Daughter     No Known Problems Son     Asthma Child     No Known Problems Maternal Half-Brother     No Known Problems Maternal Half-Sister     No Known Problems Paternal Half-Brother     No Known Problems Paternal Half-Sister     No Known Problems Niece     No Known Problems Nephew     No Known Problems Cousin     Breast Cancer Other     Anesthesia Reaction No  "family hx of     Bleeding Disorder No family hx of        Review of Systems  The complete review of systems is negative other than noted in the HPI or here.   Anesthesia Evaluation   Pt has had prior anesthetic. Type: General.    No history of anesthetic complications       ROS/MED HX  ENT/Pulmonary: Comment: Smoking 5-6 cigs daily     (+)     MARC risk factors,   obese,        tobacco use, Current use,   patient smoked within 24 hours,                 (-) recent URI   Neurologic:    (-) no seizures and no CVA   Cardiovascular: Comment: Cardioversion in 2015 for atrial flutter    (+)  - -   -  - -   Taking blood thinners Pt has not received instructions: Instructions Given to patient: Per Dr. Aguirre will hold Xarelto evening before and day of. CHF etiology: chronic systolic Last EF: 55% date: 2017  [facility]ADA.             dysrhythmias, a-fib and a-flutter, Irregular Heartbeat/Palpitations,       Previous cardiac testing   Echo: Date: 2017 Results:    Stress Test:  Date: Results:    ECG Reviewed:  Date: 4/18/22 Results:  Atrial fibrillation   Nonspecific T wave abnormality   Abnormal ECG     Cath:  Date: Results:      METS/Exercise Tolerance: 3 - Able to walk 1-2 blocks without stopping Comment: Activity can be limited by DYSPNEA ON EXERTION on some days   Hematologic:    (-) history of blood clots and history of blood transfusion   Musculoskeletal:  - neg musculoskeletal ROS     GI/Hepatic:    (-) GERD   Renal/Genitourinary: Comment: Thickened endometrium      Endo:     (+)               Obesity,       Psychiatric/Substance Use:     (+) psychiatric history depression       Infectious Disease:  - neg infectious disease ROS     Malignancy:  - neg malignancy ROS     Other:  - neg other ROS          /74 (BP Location: Right arm, Patient Position: Sitting, Cuff Size: Adult Large)   Pulse 79   Temp 97.8  F (36.6  C) (Oral)   Resp 16   Ht 1.778 m (5' 10\")   Wt 122 kg (269 lb)   SpO2 98%   BMI 38.60 kg/m      Physical " Exam   Constitutional: Awake, alert, cooperative, no apparent distress, and appears stated age.  Eyes: Pupils equal, round and reactive to light, extra ocular muscles intact, sclera clear, conjunctiva normal. Glasses on.  HENT: Normocephalic, oral pharynx with moist mucus membranes, few teeth missing. No goiter appreciated.   Respiratory: Clear to auscultation bilaterally, no crackles or wheezing. Diminished breath sounds. No cough or obvious dyspnea.  Cardiovascular: Regular rate and irregular rhythm, no murmur noted.  Carotids +2, no bruits. No edema. Palpable pulses to radial  DP and PT arteries.   GI: Normal bowel sounds, soft, non-distended, non-tender, no masses palpated.  Lymph/Hematologic: No cervical lymphadenopathy and no supraclavicular lymphadenopathy.  Genitourinary:  Deferred.   Skin: Warm and dry.    Musculoskeletal: Full ROM of neck. There is no redness, warmth, or swelling of the joints. Gross motor strength is normal.    Neurologic: Awake, alert, oriented to name, place and time. Cranial nerves II-XII are grossly intact. Gait is normal.   Neuropsychiatric: Calm, cooperative. Normal affect.     Prior Labs/Diagnostic Studies   All labs and imaging personally reviewed   Lab Results   Component Value Date    WBC 10.1 01/16/2024     Lab Results   Component Value Date    RBC 6.09 01/16/2024     Lab Results   Component Value Date    HGB 13.3 01/16/2024    HGB 12.3 09/17/2020     Lab Results   Component Value Date    HCT 42.4 01/16/2024    HCT 36.9 08/25/2015     Lab Results   Component Value Date    MCV 70 01/16/2024    MCV 71.0 08/25/2015     Lab Results   Component Value Date    MCH 21.8 01/16/2024    MCH 21.7 08/25/2015     Lab Results   Component Value Date    MCHC 31.4 01/16/2024    MCHC 30.6 08/25/2015     Lab Results   Component Value Date    RDW 18.3 01/16/2024     Lab Results   Component Value Date     01/16/2024     Last Comprehensive Metabolic Panel:  Lab Results   Component Value Date      2024    POTASSIUM 4.1 2024    CHLORIDE 100 2024    CO2 30 (H) 2024    ANIONGAP 10 2024     (H) 2024    BUN 12.0 2024    CR 0.88 2024    GFRESTIMATED 73 2024    JOSEMANUEL 9.8 2024     EK22 Atrial fibrillation   Nonspecific T wave abnormality   Abnormal ECG      Echocardiogram     Left ventricle ejection fraction is normal. The estimated left ventricular ejection fraction is 55%.     Left Atrium: Left atrial volume is moderately increased.     Right Atrium: Cavity is moderately dilated.     IVC: Central venous pressure with IVC diameter less than 2.1 cm and less   than 50% decrease during inspiration. Estimated central venous pressure   equal to 8 mmHg.     No hemodynamically significant valvular heart abnormalities.     When compared to the previous study dated 2017, LVEF is higher.      NM Stress test     CONCLUSION:    1.  Lexiscan stress nuclear study is negative for inducible myocardial ischemia or infarction.   2. The presence of left ventricular enlargement, heterogenous tracer uptake pattern, and global hypokinesis with a reduced ejection fraction of 45%   are  consistent with a cardiomyopathic process.     The patient's records and results personally reviewed by this provider.     Outside records reviewed from: Care Everywhere    Assessment    Michelle Shetty is a 63 year old female seen as a PAC referral for risk assessment and optimization for anesthesia.    Plan/Recommendations  Pt will be optimized for the proposed procedure.  See below for details on the assessment, risk, and preoperative recommendations    Anesthesia concern: Patient is worried about undergoing anesthesia with her heart conditions.     Her mother also underwent a hysterectomy at age 79 for a cancer.  She had no issues with anesthesia, but 4 days postop as she was preparing to leave the hospital she suffered an arrest and .  Per patient the  autopsy did not determine cause of death but it has been suggested that possibly her mother had a pulmonary embolism.    NEUROLOGY  - No history of TIA, CVA or seizure  Chronic back pain  -Post Op delirium risk factors:  No risk identified    ENT  - No current airway concerns.  Will need to be reassessed day of surgery.  Mallampati: II  TM: > 3    Some missing teeth    CARDIAC  Patient presented in 2014 with dyspnea on exertion and was found to be in atrial flutter and was hospitalized with fluid overload. Per notes she was diuresed 3 liters. Anticoagulation with Xarelto was initiated. Atrial flutter persisted and in 2015 she underwent cardioversion.  At that time she was also diagnosed with systolic heart failure. The patient remains on metoprolol and lisinopril at at bedtime. She has been given Lasix but takes this on an as needed basis. She describes today that she waits until she starts feeling some heaviness in her chest and then will take Lasix to diurese and she feels better. She was instructed to take Lasix in the few days before surgery so that she will present without fluid overload.    She denies chest pain but often feels irregular heartbeat.  She has no ankle edema.  She notes good and bad days, and at times her activity is limited by dyspnea on exertion.  She follows with her primary provider who has also arranged for continued cardiac care but she reports that she did not follow-up.    Dr Magdaleno and I both recommended that she return to cardiology for follow-up for the future    Per Dr. Aguirre she will hold Xarelto evening before and day of surgery. This was reinforced today.    - METS (Metabolic Equivalents)~4    RCRI: 0.9% risk of serious cardiac events    PULMONARY  Continued smoking 5 to 6 cigarettes/day  She denies cough or use of inhaler  Lungs are clear but diminished  She reports being recommended a sleep study in the past but she did not pursue  - Tobacco History    History   Smoking Status  "   Some Days    Types: Cigarettes   Smokeless Tobacco    Never       GI: Denies GERD  PONV Low Risk  Total Score: 1           1 AN PONV: Pt is Female        /RENAL  - Baseline Creatinine  0.88    ENDOCRINE    - BMI: Estimated body mass index is 38.6 kg/m  as calculated from the following:    Height as of this encounter: 1.778 m (5' 10\").    Weight as of this encounter: 122 kg (269 lb).  Obesity (BMI >30)  Prediabetes.  Last A1c 6.1.  No longer on Victoza    HEME  VTE Low Risk 0.26%             Total Score: 0      Denies personal history of blood clots  Denies personal or family history of bleeding disorders  Denies history of blood transfusion    Day of surgery orders in place for updated labs by Dr. Aguirre    MSK: Chronic back pain    PSYCH  -Some anxiety when talking about surgery and experience with her mother.    Different anesthesia methods/types have been discussed with the patient, but they are aware that the final plan will be decided by the assigned anesthesia provider on the date of service.  Patient was discussed with Dr Magdaleno who also spoke with patient     The patient is optimized for their procedure. AVS with information on surgery time/arrival time, meds and NPO status given by nursing staff. No further diagnostic testing indicated.      On the day of service:     Prep time: 15 minutes  Visit time: 15 minutes  Documentation time: 15 minutes  ------------------------------------------  Total time: 45 minutes      SYLVESTER Arana CNS  Preoperative Assessment Center  [facility]  Clinic and Surgery Center  Phone: [phone]  Fax: [phone]    "

## 2024-04-26 NOTE — PATIENT INSTRUCTIONS
Preparing for Your Surgery      Name:  Michelle Shetty   MRN:  [id number]   :  1960   Today's Date:  2024       Arriving for surgery:  Surgery date:  5/3/24  Arrival time:  10.30AM    Please come to:     Please come to:       [facility] [place] [facility]    [address] [place]  [address]     The [facility] ([facility] [place] Patient/Visitor Ramp is at [address]. Patients and visitors who self-park will receive the reduced hospital parking rate. If the Patient /Visitor Ramp is full, please follow the signs to the [employer] car park located at the main hospital entrance.       parking is available (24 hours/ 7 days a week)      Discounted parking pass options are available for patients and visitors. They can be purchased at the [employer] desk at the main hospital entrance.     -    Stop at the security desk and they will direct surgery patients to the Surgery Check in and [facility] [phone]        - If you need directions, a wheelchair or an escort please stop at the Information/security desk in the lobby.     What can I eat or drink?  -  You may eat and drink normally up to 8 hours prior to arrival time. (Until 2.30AM)  -  You may have clear liquids until 2 hours prior to arrival time. (Until 8.30AM)    Examples of clear liquids:  Water  Clear broth  Juices (apple, white grape, white cranberry  and cider) without pulp  Noncarbonated, powder based beverages  (lemonade and James-Aid)  Sodas (Sprite, 7-Up, ginger ale and seltzer)  Coffee or tea (without milk or cream)  Gatorade    -  No Alcohol or cannabis products for at least 24 hours before surgery.     Which medicines can I take?    Hold Aspirin for 7 days before surgery.   Hold Multivitamins for 7 days before surgery.  Hold Supplements for 7 days before surgery.  Hold Ibuprofen (Advil, Motrin) for 1 day(s) before surgery--unless otherwise  directed by surgeon.  Hold Naproxen (Aleve) for 4 days before surgery.    Hold Xarelto for 24 hours    -  DO NOT take these medications the day of surgery:  Xarelto, Cholestyramine (Questran), Furosemide (Lasix)    -  PLEASE TAKE these medications the day of surgery:  Metoprolol, Lisinopril (Zestril).    How do I prepare myself?  - Please take 2 showers (one the night prior to surgery and one the morning of surgery) using Scrubcare or Hibiclens soap.    Use this soap only from the neck to your toes.     Leave the soap on your skin for one minute--then rinse thoroughly.      You may use your own shampoo and conditioner. No other hair products.   - Please remove all jewelry and body piercings.  - No lotions, deodorants or fragrance.  - No makeup or fingernail polish.   - Bring your ID and insurance card.    -If you use a CPAP machine, please bring the CPAP machine, tubing, and mask to hospital.    -If you have a Deep Brain Stimulator, Spinal Cord Stimulator, or any Neuro Stimulator device---you must bring the remote control to the hospital.      ALL PATIENTS GOING HOME THE SAME DAY OF SURGERY ARE REQUIRED TO HAVE A RESPONSIBLE ADULT TO DRIVE AND BE IN ATTENDANCE WITH THEM FOR 24 HOURS FOLLOWING SURGERY.    Covid testing policy as of 12/06/2022  Your surgeon will notify and schedule you for a COVID test if one is needed before surgery--please direct any questions or COVID symptoms to your surgeon      Questions or Concerns:    - For any questions regarding the day of surgery or your hospital stay, please contact the Pre Admission Nursing Office at [phone].       - If you have health changes between today and your surgery, please call your surgeon.       - For questions after surgery, please call your surgeons office.           Current Visitor Guidelines    You may have 2 visitors in the pre op area.    Visiting hours: 8 a.m. to 8:30 p.m.    Patients confirmed or suspected to have symptoms of COVID 19 or flu:      No visitors allowed for adult patients.   Children (under age 18) can have 1 named visitor.     People who are sick or showing symptoms of COVID 19 or flu:    Are not allowed to visit patients--we can only make exceptions in special situations.       Please follow these guidelines for your visit:          Please maintain social distance          Masking is optional--however at times you may be asked to wear a mask for the safety of yourself and others     Clean your hands with alcohol hand . Do this when you arrive at and leave the building and patient room,    And again after you touch your mask or anything in the room.     Go directly to and from the room you are visiting.     Stay in the patient s room during your visit. Limit going to other places in the hospital as much as possible     Leave bags and jackets at home or in the car.     For everyone s health, please don t come and go during your visit. That includes for smoking   during your visit.

## 2024-04-30 NOTE — TELEPHONE ENCOUNTER
Writer rec'd message from Dr. Aguirre that patient needs to be rescheduled from 5/3, Dr. Aguirre is no longer available.   Writer spoke with patient to let them know. Writer offered patient 5/13 as new DOS. Patient will discuss and call writer back. Direct number was given to patient.   Tammy Sotelo on 4/30/2024 at 9:00 AM

## 2024-04-30 NOTE — TELEPHONE ENCOUNTER
"Patient called back and accepted new DOS of 5/13. Pt saw PAC for pre-op on 4/26. Writer sent out new \"your surgery day\" to patient with new information. Tammy Sotelo on 4/30/2024 at 10:39 AM    "

## 2024-05-01 ENCOUNTER — PREP FOR PROCEDURE (OUTPATIENT)
Dept: ONCOLOGY | Facility: CLINIC | Age: 64
End: 2024-05-01
Payer: COMMERCIAL

## 2024-05-13 ENCOUNTER — ANESTHESIA (OUTPATIENT)
Dept: SURGERY | Facility: CLINIC | Age: 64
End: 2024-05-13
Payer: COMMERCIAL

## 2024-05-13 ENCOUNTER — HOSPITAL ENCOUNTER (OUTPATIENT)
Facility: CLINIC | Age: 64
Discharge: HOME | End: 2024-05-13
Attending: OBSTETRICS & GYNECOLOGY | Admitting: OBSTETRICS & GYNECOLOGY
Payer: COMMERCIAL

## 2024-05-13 ENCOUNTER — HOSPITAL ENCOUNTER (OUTPATIENT)
Dept: ULTRASOUND IMAGING | Facility: CLINIC | Age: 64
Discharge: HOME | End: 2024-05-13
Attending: OBSTETRICS & GYNECOLOGY | Admitting: OBSTETRICS & GYNECOLOGY
Payer: COMMERCIAL

## 2024-05-13 VITALS
TEMPERATURE: 97.8 F | HEART RATE: 77 BPM | HEIGHT: 70 IN | DIASTOLIC BLOOD PRESSURE: 51 MMHG | RESPIRATION RATE: 16 BRPM | OXYGEN SATURATION: 98 % | WEIGHT: 268.3 LBS | BODY MASS INDEX: 38.41 KG/M2 | SYSTOLIC BLOOD PRESSURE: 97 MMHG

## 2024-05-13 DIAGNOSIS — N85.02 ENDOMETRIAL HYPERPLASIA WITH ATYPIA: Primary | ICD-10-CM

## 2024-05-13 DIAGNOSIS — R93.89 THICKENED ENDOMETRIUM: ICD-10-CM

## 2024-05-13 LAB
ABO/RH(D): NORMAL
ANTIBODY SCREEN: NEGATIVE
ERYTHROCYTE [DISTWIDTH] IN BLOOD BY AUTOMATED COUNT: 17.6 % (ref 10–15)
HCT VFR BLD AUTO: 38.5 % (ref 35–47)
HGB BLD-MCNC: 11.9 G/DL (ref 11.7–15.7)
MCH RBC QN AUTO: 22.3 PG (ref 26.5–33)
MCHC RBC AUTO-ENTMCNC: 30.9 G/DL (ref 31.5–36.5)
MCV RBC AUTO: 72 FL (ref 78–100)
PLATELET # BLD AUTO: 216 10E3/UL (ref 150–450)
RBC # BLD AUTO: 5.33 10E6/UL (ref 3.8–5.2)
SPECIMEN EXPIRATION DATE: NORMAL
WBC # BLD AUTO: 8.4 10E3/UL (ref 4–11)

## 2024-05-13 PROCEDURE — 88305 TISSUE EXAM BY PATHOLOGIST: CPT | Mod: 26 | Performed by: STUDENT IN AN ORGANIZED HEALTH CARE EDUCATION/TRAINING PROGRAM

## 2024-05-13 PROCEDURE — 58120 DILATION AND CURETTAGE: CPT | Performed by: NURSE ANESTHETIST, CERTIFIED REGISTERED

## 2024-05-13 PROCEDURE — 250N000009 HC RX 250: Performed by: NURSE ANESTHETIST, CERTIFIED REGISTERED

## 2024-05-13 PROCEDURE — 258N000003 HC RX IP 258 OP 636: Performed by: OBSTETRICS & GYNECOLOGY

## 2024-05-13 PROCEDURE — 36415 COLL VENOUS BLD VENIPUNCTURE: CPT | Performed by: OBSTETRICS & GYNECOLOGY

## 2024-05-13 PROCEDURE — 88300 SURGICAL PATH GROSS: CPT | Mod: TC | Performed by: OBSTETRICS & GYNECOLOGY

## 2024-05-13 PROCEDURE — 999N000063 US INTRAOPERATIVE

## 2024-05-13 PROCEDURE — 88300 SURGICAL PATH GROSS: CPT | Mod: 26 | Performed by: STUDENT IN AN ORGANIZED HEALTH CARE EDUCATION/TRAINING PROGRAM

## 2024-05-13 PROCEDURE — 360N000075 HC SURGERY LEVEL 2, PER MIN: Performed by: OBSTETRICS & GYNECOLOGY

## 2024-05-13 PROCEDURE — 250N000011 HC RX IP 250 OP 636: Performed by: NURSE ANESTHETIST, CERTIFIED REGISTERED

## 2024-05-13 PROCEDURE — 370N000017 HC ANESTHESIA TECHNICAL FEE, PER MIN: Performed by: OBSTETRICS & GYNECOLOGY

## 2024-05-13 PROCEDURE — 86900 BLOOD TYPING SEROLOGIC ABO: CPT | Performed by: OBSTETRICS & GYNECOLOGY

## 2024-05-13 PROCEDURE — 999N000141 HC STATISTIC PRE-PROCEDURE NURSING ASSESSMENT: Performed by: OBSTETRICS & GYNECOLOGY

## 2024-05-13 PROCEDURE — 250N000011 HC RX IP 250 OP 636: Performed by: OBSTETRICS & GYNECOLOGY

## 2024-05-13 PROCEDURE — 272N000001 HC OR GENERAL SUPPLY STERILE: Performed by: OBSTETRICS & GYNECOLOGY

## 2024-05-13 PROCEDURE — 258N000003 HC RX IP 258 OP 636: Performed by: NURSE ANESTHETIST, CERTIFIED REGISTERED

## 2024-05-13 PROCEDURE — 710N000012 HC RECOVERY PHASE 2, PER MINUTE: Performed by: OBSTETRICS & GYNECOLOGY

## 2024-05-13 PROCEDURE — 58120 DILATION AND CURETTAGE: CPT | Performed by: ANESTHESIOLOGY

## 2024-05-13 PROCEDURE — 85027 COMPLETE CBC AUTOMATED: CPT | Performed by: OBSTETRICS & GYNECOLOGY

## 2024-05-13 RX ORDER — PROPOFOL 10 MG/ML
INJECTION, EMULSION INTRAVENOUS CONTINUOUS PRN
Status: DISCONTINUED | OUTPATIENT
Start: 2024-05-13 | End: 2024-05-13

## 2024-05-13 RX ORDER — ONDANSETRON 4 MG/1
4 TABLET, ORALLY DISINTEGRATING ORAL EVERY 30 MIN PRN
Status: DISCONTINUED | OUTPATIENT
Start: 2024-05-13 | End: 2024-05-13 | Stop reason: HOSPADM

## 2024-05-13 RX ORDER — SODIUM CHLORIDE, SODIUM LACTATE, POTASSIUM CHLORIDE, CALCIUM CHLORIDE 600; 310; 30; 20 MG/100ML; MG/100ML; MG/100ML; MG/100ML
INJECTION, SOLUTION INTRAVENOUS CONTINUOUS PRN
Status: DISCONTINUED | OUTPATIENT
Start: 2024-05-13 | End: 2024-05-13

## 2024-05-13 RX ORDER — DEXAMETHASONE SODIUM PHOSPHATE 4 MG/ML
4 INJECTION, SOLUTION INTRA-ARTICULAR; INTRALESIONAL; INTRAMUSCULAR; INTRAVENOUS; SOFT TISSUE
Status: DISCONTINUED | OUTPATIENT
Start: 2024-05-13 | End: 2024-05-13 | Stop reason: HOSPADM

## 2024-05-13 RX ORDER — LIDOCAINE HYDROCHLORIDE 20 MG/ML
INJECTION, SOLUTION INFILTRATION; PERINEURAL PRN
Status: DISCONTINUED | OUTPATIENT
Start: 2024-05-13 | End: 2024-05-13

## 2024-05-13 RX ORDER — PROPOFOL 10 MG/ML
INJECTION, EMULSION INTRAVENOUS PRN
Status: DISCONTINUED | OUTPATIENT
Start: 2024-05-13 | End: 2024-05-13

## 2024-05-13 RX ORDER — OXYCODONE HYDROCHLORIDE 10 MG/1
10 TABLET ORAL
Status: DISCONTINUED | OUTPATIENT
Start: 2024-05-13 | End: 2024-05-13 | Stop reason: HOSPADM

## 2024-05-13 RX ORDER — ONDANSETRON 2 MG/ML
INJECTION INTRAMUSCULAR; INTRAVENOUS PRN
Status: DISCONTINUED | OUTPATIENT
Start: 2024-05-13 | End: 2024-05-13

## 2024-05-13 RX ORDER — ONDANSETRON 2 MG/ML
4 INJECTION INTRAMUSCULAR; INTRAVENOUS EVERY 30 MIN PRN
Status: DISCONTINUED | OUTPATIENT
Start: 2024-05-13 | End: 2024-05-13 | Stop reason: HOSPADM

## 2024-05-13 RX ORDER — CLINDAMYCIN PHOSPHATE 900 MG/50ML
900 INJECTION, SOLUTION INTRAVENOUS
Status: DISCONTINUED | OUTPATIENT
Start: 2024-05-13 | End: 2024-05-13 | Stop reason: HOSPADM

## 2024-05-13 RX ORDER — NALOXONE HYDROCHLORIDE 0.4 MG/ML
0.1 INJECTION, SOLUTION INTRAMUSCULAR; INTRAVENOUS; SUBCUTANEOUS
Status: DISCONTINUED | OUTPATIENT
Start: 2024-05-13 | End: 2024-05-13 | Stop reason: HOSPADM

## 2024-05-13 RX ORDER — FENTANYL CITRATE 50 UG/ML
INJECTION, SOLUTION INTRAMUSCULAR; INTRAVENOUS PRN
Status: DISCONTINUED | OUTPATIENT
Start: 2024-05-13 | End: 2024-05-13

## 2024-05-13 RX ORDER — ACETAMINOPHEN 325 MG/1
975 TABLET ORAL ONCE
Status: CANCELLED | OUTPATIENT
Start: 2024-05-13 | End: 2024-05-13

## 2024-05-13 RX ORDER — CLINDAMYCIN PHOSPHATE 900 MG/50ML
900 INJECTION, SOLUTION INTRAVENOUS SEE ADMIN INSTRUCTIONS
Status: DISCONTINUED | OUTPATIENT
Start: 2024-05-13 | End: 2024-05-13 | Stop reason: HOSPADM

## 2024-05-13 RX ORDER — IBUPROFEN 600 MG/1
600 TABLET, FILM COATED ORAL EVERY 6 HOURS PRN
Qty: 12 TABLET | Refills: 0 | Status: SHIPPED | OUTPATIENT
Start: 2024-05-13

## 2024-05-13 RX ORDER — IBUPROFEN 400 MG/1
800 TABLET, FILM COATED ORAL ONCE
Status: CANCELLED | OUTPATIENT
Start: 2024-05-13 | End: 2024-05-13

## 2024-05-13 RX ORDER — DEXAMETHASONE SODIUM PHOSPHATE 4 MG/ML
INJECTION, SOLUTION INTRA-ARTICULAR; INTRALESIONAL; INTRAMUSCULAR; INTRAVENOUS; SOFT TISSUE PRN
Status: DISCONTINUED | OUTPATIENT
Start: 2024-05-13 | End: 2024-05-13

## 2024-05-13 RX ORDER — ACETAMINOPHEN 325 MG/1
650 TABLET ORAL EVERY 6 HOURS PRN
Qty: 24 TABLET | Refills: 0 | Status: SHIPPED | OUTPATIENT
Start: 2024-05-13

## 2024-05-13 RX ORDER — OXYCODONE HYDROCHLORIDE 5 MG/1
5 TABLET ORAL
Status: DISCONTINUED | OUTPATIENT
Start: 2024-05-13 | End: 2024-05-13 | Stop reason: HOSPADM

## 2024-05-13 RX ADMIN — MIDAZOLAM 2 MG: 1 INJECTION INTRAMUSCULAR; INTRAVENOUS at 07:29

## 2024-05-13 RX ADMIN — SODIUM CHLORIDE, POTASSIUM CHLORIDE, SODIUM LACTATE AND CALCIUM CHLORIDE: 600; 310; 30; 20 INJECTION, SOLUTION INTRAVENOUS at 07:34

## 2024-05-13 RX ADMIN — PROPOFOL 30 MG: 10 INJECTION, EMULSION INTRAVENOUS at 07:36

## 2024-05-13 RX ADMIN — PROPOFOL 100 MCG/KG/MIN: 10 INJECTION, EMULSION INTRAVENOUS at 07:36

## 2024-05-13 RX ADMIN — CLINDAMYCIN PHOSPHATE 900 MG: 900 INJECTION, SOLUTION INTRAVENOUS at 06:30

## 2024-05-13 RX ADMIN — PHENYLEPHRINE HYDROCHLORIDE 100 MCG: 10 INJECTION INTRAVENOUS at 07:57

## 2024-05-13 RX ADMIN — DEXAMETHASONE SODIUM PHOSPHATE 4 MG: 4 INJECTION, SOLUTION INTRA-ARTICULAR; INTRALESIONAL; INTRAMUSCULAR; INTRAVENOUS; SOFT TISSUE at 07:43

## 2024-05-13 RX ADMIN — PHENYLEPHRINE HYDROCHLORIDE 100 MCG: 10 INJECTION INTRAVENOUS at 08:09

## 2024-05-13 RX ADMIN — PROPOFOL 20 MG: 10 INJECTION, EMULSION INTRAVENOUS at 07:56

## 2024-05-13 RX ADMIN — ONDANSETRON 4 MG: 2 INJECTION INTRAMUSCULAR; INTRAVENOUS at 07:43

## 2024-05-13 RX ADMIN — FENTANYL CITRATE 25 MCG: 50 INJECTION INTRAMUSCULAR; INTRAVENOUS at 07:36

## 2024-05-13 RX ADMIN — GENTAMICIN SULFATE 440 MG: 40 INJECTION, SOLUTION INTRAMUSCULAR; INTRAVENOUS at 07:06

## 2024-05-13 RX ADMIN — LIDOCAINE HYDROCHLORIDE 50 MG: 20 INJECTION, SOLUTION INFILTRATION; PERINEURAL at 07:36

## 2024-05-13 ASSESSMENT — ACTIVITIES OF DAILY LIVING (ADL)
ADLS_ACUITY_SCORE: 31
ADLS_ACUITY_SCORE: 32

## 2024-05-13 ASSESSMENT — ENCOUNTER SYMPTOMS
SEIZURES: 0
DYSRHYTHMIAS: 1

## 2024-05-13 ASSESSMENT — LIFESTYLE VARIABLES: TOBACCO_USE: 1

## 2024-05-13 NOTE — ANESTHESIA POSTPROCEDURE EVALUATION
Patient: Michelle Shetty    Procedure: Procedure(s):  DILATION AND CURETTAGE, UTERUS, WITH ULTRASOUND GUIDANCE  MIRENA IUD PLACEMENT       Anesthesia Type:  MAC    Note:  Disposition: Outpatient   Postop Pain Control: Uneventful            Sign Out: Well controlled pain   PONV: No   Neuro/Psych: Uneventful            Sign Out: Acceptable/Baseline neuro status   Airway/Respiratory: Uneventful            Sign Out: Acceptable/Baseline resp. status   CV/Hemodynamics: Uneventful            Sign Out: Acceptable CV status; No obvious hypovolemia; No obvious fluid overload   Other NRE: NONE   DID A NON-ROUTINE EVENT OCCUR? No           Last vitals:  Vitals Value Taken Time   BP 97/51 05/13/24 0841   Temp 36.6  C (97.8  F) 05/13/24 0841   Pulse 77 05/13/24 0841   Resp 16 05/13/24 0841   SpO2 100 % 05/13/24 0841   Vitals shown include unfiled device data.    Electronically Signed By: Emily Bell MD  May 13, 2024  10:51 AM

## 2024-05-13 NOTE — ANESTHESIA CARE TRANSFER NOTE
Patient: Michelle Shetty    Procedure: Procedure(s):  DILATION AND CURETTAGE, UTERUS, WITH ULTRASOUND GUIDANCE  MIRENA IUD PLACEMENT       Diagnosis: Thickened endometrium [R93.89]  Diagnosis Additional Information: No value filed.    Anesthesia Type:   MAC     Note:    Oropharynx: oropharynx clear of all foreign objects and spontaneously breathing  Level of Consciousness: awake  Oxygen Supplementation: room air    Independent Airway: airway patency satisfactory and stable  Dentition: dentition unchanged  Vital Signs Stable: post-procedure vital signs reviewed and stable  Report to RN Given: handoff report given  Patient transferred to: Phase II    Handoff Report: Identifed the Patient, Identified the Reponsible Provider, Reviewed the pertinent medical history, Discussed the surgical course, Reviewed Intra-OP anesthesia mangement and issues during anesthesia, Set expectations for post-procedure period and Allowed opportunity for questions and acknowledgement of understanding  Vitals:  Vitals Value Taken Time   BP     Temp     Pulse     Resp     SpO2 100 % 05/13/24 0820   Vitals shown include unfiled device data.    Electronically Signed By: SYLVESTER Torres CRNA  May 13, 2024  8:21 AM

## 2024-05-13 NOTE — OR NURSING
Discharge instructions printed and reviewed with patient and .  All questions answered at this time.  Discharge prescriptions were picked up by .  All belongings returned to patient at this time.

## 2024-05-13 NOTE — OP NOTE
[facility] Gynecology Operative Note    Date of Surgery: 5/13/2024  Patient: Michelle Shetty  MRN: [id number]    Preop Dx:  - Atypical Endometrial Hyperplasia  - Atrial Flutter  - Systolic Heart Failure    Postop Dx:   - Same s/p procedure     Procedure:   Dilation and curettage under ultrasound guidance  Mirena IUD Removal   Mirena IUD Reinsertion      Surgeon: Rossana Aguirre MD     Assistants: Ritu Huang MD PGY3     Anesthesia:   - MAC      EBL: 5 cc     IVF: 400 cc     UOP: Voided prior to procedure     Drains: None     Specimen: Endometrial curettings      Complications: None apparent     Findings: On bimanual exam, enlarged, anteverted uterus. Cervix without masses or lesions. IUD strings palpated.. No adnexal masses palpable. Bedside ultrasound revealed posterior intramural mass, consistent with uterine fibroid. Upon speculum placement cervix and IUD strings visualized and IUD removed intact. Cervix serially dilated and and sharp curettage performed under ultrasound guidance with moderate return of tissue. Mirena IUD reinserted and noted to be at uterine fundus on bedside ultrasound. No signs of perforation. IUD strings cut approximately 3cm. Tenaculum sites hemostatic. Mirena IUD Lot #[id number], EXP 07/2026.    Indications: Ms. Michelle Shetty is a 63 year old year old who was diagnosed with atypical endometrial hyperplasia in 20202 that has been managed with Mirena IUD in the setting of complex medical history. Most recent ultrasound 4/11/24 reveals EMS of 6mm with Mirean IUD in the WILLIAM. Given these findings recommendation to proceed with dilation and curettage under ultrasound guidance, along with Mirena IUD replacement. Management options discussed with patient and she elected to proceed with surgical management. The risks, benefits, and alternatives to the dilation and curettage and Mirena IUD removal/reinsertion were discussed and patient would like to proceed with the procedure.    Procedure:  The patient was taken to the OR where MAC anesthesia was administered without difficulty. She was placed in the dorsal lithotomy position with yellow-fin stirrups. Exam under anesthesia revealed the above findings. The patient was prepped and draped in usual sterile fashion.    A speculum was introduced into the vagina and used to visualize the cervix. Mirena IUD strings were visualized and device was removed intact with ring forceps. A single-toothed tenaculum was used to grasp the anterior lip of the cervix. Hegar dilators were then used to dilate the cervix to 7 mm under ultrasound guidance. Uterus was sounded to 12cm.  A sharp curettage was then performed with moderate amount of polypoid tissue obtained. Several passes were performed ensuring curettage of all uterine quadrants. Tissue was sent to pathology. The Mirena IUD insertion apparatus was prepared and placed through the cervix without significant resistance and deployed at the fundus in the usual fashion under sterile technique and ultrasound guidance to ensure proper placement at the fundus. The strings were trimmed to mid vagina. Instruments were then removed with good hemostasis noted at the tenaculum sites.    The patient tolerated the procedure well. The instrument and sponge counts were correct x2. The patient went to the recovery room in stable condition.     Dr. Aguirre was present and scrubbed for the entire procedure.    Ritu Huang MD  OB/GYN Resident PGY-3  05/13/24 5:15 AM     As the primary surgeon, I was scrubbed and present for the full course of the procedure.    Rossana Aguirre M.D.

## 2024-05-13 NOTE — ANESTHESIA PREPROCEDURE EVALUATION
Anesthesia Pre-Procedure Evaluation    Patient: Michelle Shetty   MRN: [id number] : 1960        Procedure : Procedure(s):  DILATION AND CURETTAGE, UTERUS, WITH ULTRASOUND GUIDANCE  MIRENA IUD PLACEMENT          Past Medical History:   Diagnosis Date    Anemia     Atrial fibrillation     Atrial flutter (H)     Current moderate episode of major depressive disorder without prior episode (H) 01/10/2023    Diastolic heart failure (H)     Endometrial hyperplasia with atypia       Past Surgical History:   Procedure Laterality Date    APPENDECTOMY      BREAST SURGERY      benign tumor left breast    CARDIOVERSION      1/16/15 for a flutter    CHOLECYSTECTOMY      CHOLECYSTECTOMY      FRACTURE SURGERY      ankle    H LAP ABLAT UTERINE FIBROIDS W/INTRAOP US GUIDE      MYOMECTOMY ABDOMINAL APPROACH      OTHER SURGICAL HISTORY      ablationUterine Fibroids      Allergies   Allergen Reactions    Amoxicillin Anaphylaxis     Pt doesn't remember this and thinks that its okay    Codeine       Social History     Tobacco Use    Smoking status: Some Days     Current packs/day: 0.25     Types: Cigarettes    Smokeless tobacco: Never    Tobacco comments:     5-6 cigarettes daily    Substance Use Topics    Alcohol use: Yes     Alcohol/week: 1.0 standard drink of alcohol     Types: 1 Glasses of wine per week     Comment: 1 drink a day      Wt Readings from Last 1 Encounters:   24 121.7 kg (268 lb 4.8 oz)        Anesthesia Evaluation   Pt has had prior anesthetic. Type: MAC and General.    No history of anesthetic complications       ROS/MED HX  ENT/Pulmonary: Comment: Smoking 5-6 cigs daily     (+)     MRAC risk factors,   obese,        tobacco use, Current use,   patient smoked within 24 hours,                 (-) recent URI   Neurologic:    (-) no seizures and no CVA   Cardiovascular: Comment: Cardioversion in  for atrial flutter    (+)  - -   -  - -   Taking blood thinners Pt has not received instructions: Instructions  Given to patient: Per Dr. Aguirre will hold Xarelto evening before and day of. CHF etiology: chronic systolic Last EF: 55% date: 2017  POSADA.             dysrhythmias, a-fib and a-flutter, Irregular Heartbeat/Palpitations,       Previous cardiac testing   Echo: Date: 2017 Results:    Stress Test:  Date: Results:    ECG Reviewed:  Date: 4/18/22 Results:  Atrial fibrillation   Nonspecific T wave abnormality   Abnormal ECG     Cath:  Date: Results:      METS/Exercise Tolerance: 3 - Able to walk 1-2 blocks without stopping Comment: Activity can be limited by DYSPNEA ON EXERTION on some days   Hematologic:    (-) history of blood clots and history of blood transfusion   Musculoskeletal:  - neg musculoskeletal ROS     GI/Hepatic:    (-) GERD   Renal/Genitourinary: Comment: Thickened endometrium      Endo:     (+)               Obesity,       Psychiatric/Substance Use:     (+) psychiatric history depression       Infectious Disease:  - neg infectious disease ROS     Malignancy:  - neg malignancy ROS     Other:  - neg other ROS          Physical Exam    Airway        Mallampati: II   TM distance: > 3 FB   Neck ROM: full   Mouth opening: > 3 cm    Respiratory Devices and Support         Dental     Comment: Several missing teeth, none loose        Cardiovascular          Rhythm and rate: irregular and tachycardia     Pulmonary           breath sounds clear to auscultation           OUTSIDE LABS:  CBC:   Lab Results   Component Value Date    WBC 8.4 05/13/2024    WBC 10.1 01/16/2024    HGB 11.9 05/13/2024    HGB 13.3 01/16/2024    HCT 38.5 05/13/2024    HCT 42.4 01/16/2024     05/13/2024     01/16/2024     BMP:   Lab Results   Component Value Date     01/16/2024     01/10/2023    POTASSIUM 4.1 01/16/2024    POTASSIUM 4.2 01/10/2023    CHLORIDE 100 01/16/2024    CHLORIDE 102 01/10/2023    CO2 30 (H) 01/16/2024    CO2 27 01/10/2023    BUN 12.0 01/16/2024    BUN 15.2 01/10/2023    CR 0.88 01/16/2024    CR  "1.03 (H) 01/10/2023     (H) 01/16/2024     (H) 01/10/2023     COAGS:   Lab Results   Component Value Date    INR 1.7 02/01/2018     POC: No results found for: \"BGM\", \"HCG\", \"HCGS\"  HEPATIC:   Lab Results   Component Value Date    PROTTOTAL 7.2 12/04/2017    ALT 22 01/16/2024    AST 13.7 12/04/2017    ALKPHOS 80.9 12/04/2017    BILITOTAL 0.3 12/04/2017     OTHER:   Lab Results   Component Value Date    A1C 6.1 (H) 01/16/2024    JOSEMANUEL 9.8 01/16/2024    MAG 2.1 03/21/2019       Anesthesia Plan    ASA Status:  3    NPO Status:  NPO Appropriate    Anesthesia Type: MAC.     - Reason for MAC: straight local not clinically adequate, chronic cardiopulmonary disease   Induction: Propofol.           Consents    Anesthesia Plan(s) and associated risks, benefits, and realistic alternatives discussed. Questions answered and patient/representative(s) expressed understanding.     - Discussed:     - Discussed with:  Patient, Spouse      - Extended Intubation/Ventilatory Support Discussed: No.      - Patient is DNR/DNI Status: No     Use of blood products discussed: No .     Postoperative Care    Pain management: IV analgesics.   PONV prophylaxis: Ondansetron (or other 5HT-3)     Comments:    Other Comments: NPO. Meds reviewed. Afib managed. No CP, unchanged shortness of breath. Remote history of anesthesia, no problems. Pt feels better about anesthetic risks after discussion in PAC. No recent URI.           Emily Bell MD    I have reviewed the pertinent notes and labs in the chart from the past 30 days and (re)examined the patient.  Any updates or changes from those notes are reflected in this note.            # Drug Induced Coagulation Defect: home medication list includes an anticoagulant medication   # Obesity: Estimated body mass index is 38.5 kg/m  as calculated from the following:    Height as of this encounter: 1.778 m (5' 10\").    Weight as of this encounter: 121.7 kg (268 lb 4.8 oz).      "

## 2024-05-13 NOTE — DISCHARGE INSTRUCTIONS
Contacting your Doctor -   To contact a doctor, call Dr. Aguirre [phone] or:  [phone] and ask for the resident on call for Gynecology (answered 24 hours a day)   Emergency Department:  [facility]: [phone]  [facility]: [phone] 911 if you are in need of immediate or emergent help

## 2024-05-23 DIAGNOSIS — N85.02 ENDOMETRIAL HYPERPLASIA WITH ATYPIA: Primary | ICD-10-CM

## 2024-05-29 ENCOUNTER — ALLIED HEALTH/NURSE VISIT (OUTPATIENT)
Dept: RESEARCH | Facility: CLINIC | Age: 64
End: 2024-05-29
Payer: COMMERCIAL

## 2024-05-29 VITALS
HEART RATE: 72 BPM | SYSTOLIC BLOOD PRESSURE: 111 MMHG | HEIGHT: 70 IN | BODY MASS INDEX: 37.94 KG/M2 | WEIGHT: 265 LBS | RESPIRATION RATE: 16 BRPM | DIASTOLIC BLOOD PRESSURE: 84 MMHG | TEMPERATURE: 97.8 F | OXYGEN SATURATION: 99 %

## 2024-05-29 DIAGNOSIS — Z00.6 EXAMINATION OF PARTICIPANT OR CONTROL IN CLINICAL RESEARCH: Primary | ICD-10-CM

## 2024-05-29 LAB
ATRIAL RATE - MUSE: 105 BPM
ATRIAL RATE - MUSE: 75 BPM
DIASTOLIC BLOOD PRESSURE - MUSE: NORMAL MMHG
DIASTOLIC BLOOD PRESSURE - MUSE: NORMAL MMHG
INTERPRETATION ECG - MUSE: NORMAL
INTERPRETATION ECG - MUSE: NORMAL
P AXIS - MUSE: NORMAL DEGREES
P AXIS - MUSE: NORMAL DEGREES
PR INTERVAL - MUSE: NORMAL MS
PR INTERVAL - MUSE: NORMAL MS
QRS DURATION - MUSE: 80 MS
QRS DURATION - MUSE: 84 MS
QT - MUSE: 412 MS
QT - MUSE: 416 MS
QTC - MUSE: 468 MS
QTC - MUSE: 478 MS
R AXIS - MUSE: 41 DEGREES
R AXIS - MUSE: 51 DEGREES
SYSTOLIC BLOOD PRESSURE - MUSE: NORMAL MMHG
SYSTOLIC BLOOD PRESSURE - MUSE: NORMAL MMHG
T AXIS - MUSE: 0 DEGREES
T AXIS - MUSE: 1 DEGREES
VENTRICULAR RATE- MUSE: 76 BPM
VENTRICULAR RATE- MUSE: 81 BPM

## 2024-05-29 PROCEDURE — 93005 ELECTROCARDIOGRAM TRACING: CPT

## 2024-05-29 PROCEDURE — 99207 PR NO CHARGE-RESEARCH SERVICE: CPT

## 2024-05-29 PROCEDURE — 93000 ELECTROCARDIOGRAM COMPLETE: CPT | Mod: RTG | Performed by: STUDENT IN AN ORGANIZED HEALTH CARE EDUCATION/TRAINING PROGRAM

## 2024-05-29 PROCEDURE — 93010 ELECTROCARDIOGRAM REPORT: CPT | Performed by: STUDENT IN AN ORGANIZED HEALTH CARE EDUCATION/TRAINING PROGRAM

## 2024-05-29 NOTE — PROGRESS NOTES
Whoop Study Consent    Study Description: The objective of this study is to evaluate the safety and performance of the WHOOP ECG feature in adults (22 years or older) with normal sinus rhythm or diagnosed with AF.      CONSENT     Michelle Shetty a 63 year old female, was on-site today to discuss participation in the Whoop Study.       The consent form was reviewed with the patient.     The review of the study included:  Study Purpose    Participant Responsibilities    Study Data and Devices    Benefits and Risks of Participation    Compensation and Costs of Participation    Voluntary Participation    Confidentiality    Injury and Legal Rights      Protocol Version: 3.0 (Version Date 30-Apr-2024)    Screening Number: [id number]    The subject was queried in regards to her willingness to continue and her questions were answered to her satisfaction.   The patient has given her agreement to volunteer and participate in the above noted study.   The eConsent and HIPAA form version 2.0 (Version Date 12-Apr-2024) was signed  on  29-MAY-2024 with the Clinical Research Unit of [facility].     A copy of the consent will be placed in subject's medical record. A copy of the consent form was given to the subject today.    Study data is directly entered into Epic and Packet[employer] per protocol.     No study procedures were done prior to Michelle Shetty providing informed consent.     Has this subject had a previous screen failure in this study? No    If yes, previous Subject Number: NA     Study Phase: Phase 2    29-MAY-2024    Dinorah Collins

## 2024-05-29 NOTE — PROGRESS NOTES
"    Study Physical Exam      Medical History Reviewed? Yes    Physical Examination  For abnormal findings, please evaluate if the finding is Clinically Significant (by 'CS') or Not Clinically Significant (by 'NCS')  General Appearance   Normal  Head and Neck   Normal  Eyes     Normal  Ears, Nose Mouth and Throat  Normal  Cardiovascular   Abnormal; NCS due to history of atrial fibrillation  irregular heart beat but normal rate  Respiratory    Normal  Skin and Hair of Wrists  Normal      Physical disability that presents safe or adequate testing: Not present  Dermatological conditions that preclude wearing of sensor or satisfactory data acquisition: Not present  Tremor: Not present  Other: Normal    Vitals:    05/29/24 1308   BP: 111/84   BP Location: Right arm   Patient Position: Sitting   Cuff Size: Adult Regular   Pulse: 72   Resp: 16   Temp: 97.8  F (36.6  C)   SpO2: 99%   Weight: 120.2 kg (265 lb)   Height: 1.784 m (5' 10.25\")            Electrocardiogram Interpretation:   12 Lead Interpretation: Atrial Fibrillation       29-MAY-2024    Kathleen Harden NP      "

## 2024-05-29 NOTE — PROGRESS NOTES
Whoop Study    Study Description: The [employer] Labsop ECG feature is a software-only mobile medical application intended for use with the Whoop strap to create, record, store, transfer and display a single-channel electrocardiogram (ECG) qualitatively similar to a lead I ECG.    SCREENING       Demographic Info  Michelle Shetty   1960          63 year old  female    Woman of Child Bearing Potential?  No   If no, Why? Do not have ovaries    Race: Black/  Ethnicity: Not  or      Time Seated/Time of Assessment: 12:57:00 HH:MM:SS    Heart Abnormalities:  History of Heart Rhythm Abnormalities? Yes Specify Below  AF long-standing (>12 months duration) Onset Year: 2017     Past Medical History:   Diagnosis Date    Atrial fibrillation ; Ongoing  12/4/2017    Chronic heart failure (H); Ongoing  12/4/2017   Late  Note: Ongoing status and start date added to medical conditions. -[id number]    Allergies   Allergen Reactions    Amoxicillin Anaphylaxis     Pt doesn't remember this and thinks that its okay    Codeine           Current Outpatient Medications:   Medication Name (Generic) Indication Start Date Ongoing? Dose Unit Frequency Route   Furosemide  Chronic heart failure 1/16/2024 Yes 40  mg QD Oral   Lisinopril Chronic heart failure 1/16/2024 Yes 5 mg QD Oral   Metoprolol succinate Chronic heart failure 1/16/2024 Yes 100 mg QD Oral   Rivaroxaban Atrial fibrillation 4/25/2024 Yes 20 mg QD Oral       Lifestyle Habits  How often do you...  Exercise: Occasionally (a few times a month)  Consume Caffeine:Occasionally (a few times a month)  Use Tobacco/Nicotine: Daily  Consume Alcohol: Occasionally (a few times a month)  Use Recreational Drugs: Never    Dominant Hand: Left  Preferred Band Hand: Right  Reason for Choosing Band Hand: Subject preference    Skin Fold Thickness: 4.2 mm  Band Wrist Circumference: 168 mm  Wrist Hairiness: Fine, low density    Does the subject have tattoos,  "moles, or scars on band wrist? No  Pena Skin Tone: Pena: 6    Vitals Assessment Time 13:08:00  /84 (BP Location: Right arm, Patient Position: Sitting, Cuff Size: Adult Regular)   Pulse 72   Temp 97.8  F (36.6  C)   Resp 16   Ht 1.784 m (5' 10.25\")   Wt 120.2 kg (265 lb)   SpO2 99%   BMI 37.75 kg/m      Wait 1 minute between repeat measurements   Heart Rate (bpm) SpO2 (%) Blood Pressure     Assessment Time Result Result  Result    2nd Measurement 13:10:00 78 97 111/67   3rd Measurement 13:12:00 72 97 114/76       Please see Screening ECG for providers rhythm interpretation.    ECG Rhythm Strip Time 13:20:23    Physical Exam Time of Assessment is equivalent to ECG time as AKIN is present for the ECG recording and continues their assessment thereafter.     ENROLLMENT     Subject has met all requirements and is Enrolled in the Study?: Yes    Enrollment Number: [id number]   [employer] User ID: [id number]     DATA COLLECTION     Device Information    Study mobile device and laptop synchronized? Yes    Comparative (Holter) device recorder date and time set according to local clock? Yes    TVbeat Kit 7: Strap SN: [id number]  Study Phone: [phone]     Was the WHOOP Strap Applied? Yes  Time of Application: 14:06:00     Subject reviewed [employer] device instructions for use? Yes    Comparative (Holter) ECG device applied? Yes  Time of  Holter Application: 14:10:00    Comparative Holter Kit IDs (insert name Rosey)  Study Laptop Device  [id number]  Holter Device S/N  [id number] - [id number]    Lead Placement QC Performed by:  Kesha VEE    Practice Strap ECG Taken? Yes  Number of Practice Trials: 1     Data recording for ECG-Resting and ECG-Exercise sections- were directly entered into EDC during study visit.       END OF STUDY     Was the wrist device removed after all the trials? Yes  Was the 12 lead ECG Holter detached after all the trials? Yes    Any Adverse Events? No  Any Protocol Deviations? No    Any changes in " medication since screening visit? No  Any device deficiencies? No If yes complete a device deficiency note    Is all study data for this visit collected? Yes      Date Subject Exited the Study: 29-MAY-2024  Time Subject Exited the Study: 15:05:00     Did the subject successfully complete the study? Yes   If no, Why? N/A        29-MAY-2024    Dinorah Collins

## 2024-05-29 NOTE — PROGRESS NOTES
[facility] Inclusion/Exclusion Criteria:    Study Name: [facility]   : Jesenia Houser MD      Study Description: The [employer] ECG feature is a software-only mobile medical application intended for use with the Whoop strap to create, record, store, transfer and display a single-channel electrocardiogram (ECG) qualitatively similar to a lead I ECG.     Protocol Version: 3.0 (30-Apr-2024)  Consent Version: 2.0 ( 12-Apr-2024 )    Criteria #  Inclusion Criteria (ALL MUST BE YES)  YES/NO/N/A   1  Aged 22 years or older  Yes   2 Ability to provide informed consent Yes   3 Willing to participate and to follow the procedures per the Principle Investigator's instructions Yes   4  Resides in the [place]    Yes   5   Wrist circumference: 130 mm to 245 mm at band wear position Yes   6    Previous medical history of persistent, permanent, or chronic AF and being in AF at the time of enrollment (AF Cohort Only)    Yes   7     No known history of AF and being in normal sinus rhythm at the time of enrollment (SR Cohort Only) NA             Criteria # Exclusion Criteria (ALL MUST BE NO) YES/NO/N/A   1  Subjects with an implantable pacemaker device or implantable cardioverter-defibrillator device No   2 Medical history of a life-threatening cardiac arrhythmia eg., Ventricular tachycardia or fibrillation No   3  Any known allergies to medical adhesives, isopropyl alcohol, or ECG patch    No   4  Any known allergy or or sensitivity to thermoplastics, metals with PVD coatings or Elastane used in the wrist fitness device  No   5  Clinically significant body tremors that compromise study measurements    No   6  Pregnant at the time of enrollment   No   7  Any physical disability that prevents safe and adequate testing    No   8  Physical or medical impairments that preclude exercise testing, including, but not limited to, back pain and leg claudication No   9  Mental impairment as determined by the Investigator or  designee    No   10  Subjects with any medical history, physical examination finding, vital sign or other finding or assessment that could compromise subject safety, study integrity or accurate that could compromise subject safety, study integrity or accurate assessment of study objectives. This includes, but is not limited to, known untreated medical conditions that may be considered clinically significant, such as significant anemia, electrolyte imbalance, untreated or uncontrolled thyroid disease, and open wound(s) in the areas of test band positioning   No   11    Vital sign measurements, medical history of physical examination finding that makes the subject inappropriate for participation according to the investigator * No   12    Scarring, tattoos, large, pigmented moles or other skin pathology in the area of sensor location  No   13    Severe skin conditions on either wrist, that would preclude wearing the sensor. Severe symptomatic skin injury, disorder, allergy or disease such as eczema, rosacea, impetigo, dermatomyositis, or contact dermatitis on wrists or other areas where sensors or electrodes will be placed  No   14    Clinically significant hand tremors as judged by the Investigator No   15  Participated in Phase 1 of the study (Only for Phase 2 cohort)  No   * If subject is a screen fail due to PE findings, choose whichever exclusion criteria matches that finding in addition to E10.     Patient does fulfill study inclusion criteria and no exclusion criteria are found.     Jesenia Houser MD    29-MAY-2024    Dinorah Collins

## 2024-05-29 NOTE — PROGRESS NOTES
Study Description: The objective of this study is to evaluate the safety and performance of the WHOOP ECG feature in adults (22 years or older) with normal sinus rhythm or diagnosed with AF.    Post Exercise Assessment: Michelle Shetty  denies symptoms post exercise a 12 lead ECG was completed and reviewed.     Electrocardiogram has returned to baseline and Michelle Shetty was released from the clinical research unit and completed the study.     29 -MAY- 2024    Kathleen Harden NP

## 2024-06-22 ENCOUNTER — HEALTH MAINTENANCE LETTER (OUTPATIENT)
Age: 64
End: 2024-06-22

## 2024-10-29 DIAGNOSIS — R73.03 PREDIABETES: ICD-10-CM

## 2024-10-29 DIAGNOSIS — I50.22 CHRONIC SYSTOLIC HEART FAILURE (H): ICD-10-CM

## 2024-10-29 DIAGNOSIS — I48.20 CHRONIC ATRIAL FIBRILLATION (H): Primary | ICD-10-CM

## 2024-10-30 ENCOUNTER — OFFICE VISIT (OUTPATIENT)
Dept: FAMILY MEDICINE | Facility: CLINIC | Age: 64
End: 2024-10-30
Payer: COMMERCIAL

## 2024-10-30 VITALS
HEIGHT: 70 IN | TEMPERATURE: 97.6 F | OXYGEN SATURATION: 99 % | WEIGHT: 271 LBS | BODY MASS INDEX: 38.8 KG/M2 | RESPIRATION RATE: 16 BRPM | DIASTOLIC BLOOD PRESSURE: 81 MMHG | SYSTOLIC BLOOD PRESSURE: 118 MMHG | HEART RATE: 80 BPM

## 2024-10-30 DIAGNOSIS — F51.01 PRIMARY INSOMNIA: ICD-10-CM

## 2024-10-30 DIAGNOSIS — I48.20 CHRONIC ATRIAL FIBRILLATION (H): ICD-10-CM

## 2024-10-30 DIAGNOSIS — I50.22 CHRONIC SYSTOLIC CONGESTIVE HEART FAILURE (H): ICD-10-CM

## 2024-10-30 DIAGNOSIS — I50.22 CHRONIC SYSTOLIC HEART FAILURE (H): ICD-10-CM

## 2024-10-30 DIAGNOSIS — L20.82 FLEXURAL ECZEMA: Primary | ICD-10-CM

## 2024-10-30 DIAGNOSIS — R73.03 PREDIABETES: ICD-10-CM

## 2024-10-30 DIAGNOSIS — E66.01 MORBID OBESITY (H): ICD-10-CM

## 2024-10-30 DIAGNOSIS — Z23 NEED FOR PROPHYLACTIC VACCINATION AND INOCULATION AGAINST INFLUENZA: ICD-10-CM

## 2024-10-30 LAB
EST. AVERAGE GLUCOSE BLD GHB EST-MCNC: 126 MG/DL
HBA1C MFR BLD: 6 % (ref 0–5.6)

## 2024-10-30 PROCEDURE — 82570 ASSAY OF URINE CREATININE: CPT | Performed by: FAMILY MEDICINE

## 2024-10-30 PROCEDURE — 90480 ADMN SARSCOV2 VAC 1/ONLY CMP: CPT | Performed by: FAMILY MEDICINE

## 2024-10-30 PROCEDURE — 83036 HEMOGLOBIN GLYCOSYLATED A1C: CPT | Performed by: FAMILY MEDICINE

## 2024-10-30 PROCEDURE — 99214 OFFICE O/P EST MOD 30 MIN: CPT | Mod: 25 | Performed by: FAMILY MEDICINE

## 2024-10-30 PROCEDURE — 80048 BASIC METABOLIC PNL TOTAL CA: CPT | Performed by: FAMILY MEDICINE

## 2024-10-30 PROCEDURE — 36415 COLL VENOUS BLD VENIPUNCTURE: CPT | Performed by: FAMILY MEDICINE

## 2024-10-30 PROCEDURE — 90678 RSV VACC PREF BIVALENT IM: CPT | Performed by: FAMILY MEDICINE

## 2024-10-30 PROCEDURE — 82043 UR ALBUMIN QUANTITATIVE: CPT | Performed by: FAMILY MEDICINE

## 2024-10-30 PROCEDURE — 91320 SARSCV2 VAC 30MCG TRS-SUC IM: CPT | Performed by: FAMILY MEDICINE

## 2024-10-30 PROCEDURE — 90656 IIV3 VACC NO PRSV 0.5 ML IM: CPT | Performed by: FAMILY MEDICINE

## 2024-10-30 PROCEDURE — 90471 IMMUNIZATION ADMIN: CPT | Performed by: FAMILY MEDICINE

## 2024-10-30 PROCEDURE — 80061 LIPID PANEL: CPT | Performed by: FAMILY MEDICINE

## 2024-10-30 PROCEDURE — 90472 IMMUNIZATION ADMIN EACH ADD: CPT | Performed by: FAMILY MEDICINE

## 2024-10-30 RX ORDER — LISINOPRIL 5 MG/1
5 TABLET ORAL DAILY
Qty: 90 TABLET | Refills: 3 | Status: SHIPPED | OUTPATIENT
Start: 2024-10-30

## 2024-10-30 RX ORDER — TRIAMCINOLONE ACETONIDE 5 MG/G
OINTMENT TOPICAL
Qty: 30 G | Refills: 3 | Status: SHIPPED | OUTPATIENT
Start: 2024-10-30

## 2024-10-30 RX ORDER — METOPROLOL SUCCINATE 100 MG/1
150 TABLET, EXTENDED RELEASE ORAL DAILY
Qty: 135 TABLET | Refills: 3 | Status: SHIPPED | OUTPATIENT
Start: 2024-10-30 | End: 2024-11-01

## 2024-10-30 RX ORDER — DOXEPIN HYDROCHLORIDE 25 MG/1
25 CAPSULE ORAL AT BEDTIME
Qty: 90 CAPSULE | Refills: 3 | Status: SHIPPED | OUTPATIENT
Start: 2024-10-30

## 2024-10-30 ASSESSMENT — PATIENT HEALTH QUESTIONNAIRE - PHQ9: SUM OF ALL RESPONSES TO PHQ QUESTIONS 1-9: 10

## 2024-10-30 NOTE — PATIENT INSTRUCTIONS
Check out [facility] pharmacy or [facility] to see if you can get Wegovy cheaper.    Check out the Breonna called CBT-I from the [facility].      [facility]  Cognitive Behavioral Therapy for Insomnia   Cognitive Training Module    Developing Healthy Sleep Thoughts    Insomnia is often is triggered by stressful events such as a change in employment, a separation, medical illness, or loss.  How you handle your sleep problem, mainly your thoughts and behaviors, determines in large part whether your sleeplessness is short -term or develops into chronic insomnia well after the stressful event is over.     This part of your program involves learning a set of skills to change negative and worrisome thoughts about sleep.   Insomnia is more likely to persist if you interpret the onset as a threat or loss of control.  Worry, fears and untrue beliefs about insomnia can become a vicious cycle.  Negative sleep thoughts activate the physical and emotional systems of your body.  This strengthens wakefulness and weakens your sleep system.  This in turn produces increased stress and pressure to sleep.  We call this unhelpful sleep effort.     Changing How You Think About Insomnia    We now know that our thoughts and attitudes affect our stress response.  Negative sleep thoughts can worsen your insomnia. They can lead to greater fear or anxiety about sleep, which in turn can aggravate your sleep problem. Thinking more positively and realistically about your sleep promotes its health and healing.     Myths about Sleep    People need 8 hours of sleep     This is untrue.  Sleep needs vary from person to person.  Most people need between 6-8 hours to feel alert during the day.  People who sleep 7 hours live longer than those who sleep 8 hours.  Research also suggests people who sleep 5 hours live longer than those who sleep 9 hours    Insomnia Causes Dementia     While there is lots of research happening looking into   various causes of dementia, there is currently no conclusive evidence that insomnia causes dementia.  We do know that the rate of Insomnia is higher in a host of health conditions that have been associated with increased risk of certain types of dementia.  In general, people with primary insomnia perform similar to       normal sleepers in tests of neurocognitive ability.      Insomnia is the same as sleep deprivation    Sleep deprivation is lack of sleep due to not allowing enough time for sleep.  This is typically not true for insomnia where people have enough time for sleep and even extend their sleep time trying to get more sleep.  Most people with insomnia get about the same amount of sleep as normal sleepers.  The difference is that with insomnia the sleep is fragmented and less consolidated.       You are Getting More Sleep than You Think    Research using objective sleep tests reveal that people with insomnia get an average of one hour more sleep than they think. This is due in part because the brain misperceives Stage 1 and 2 sleep as being awake.  In addition, stress and arousal while awake in bed changes the brain's perception of time awake.    Examples of Unhealthy Sleep Thoughts    Negative sleep thoughts can have a profound impact on your ability to get a good night's sleep. Below are some examples of negative thoughts associated with insomnia that may sound familiar to you:    I must get 8 hours of sleep to function during the day.    I won't get to sleep tonight.    Insomnia is going to cause health problems.    I am dreading going to bed.    I woke up early again.  I know I won't get back to sleep.    The reason I feel terrible today is because of my insomnia.    I've totally lost control of my sleep.    I can't sleep without a sleeping pill.    Negative thoughts usually occur automatically and feel like a knee-jerk reaction.  They are often untrue or distorted, especially late in the evening as you  become increasingly tired and others are asleep.      Changing Unhealthy Sleep Thoughts    Now that you understand the impact that negative thoughts can have on your sleep, you are ready to change these unhelpful thoughts.  The strategy is powerful and simple:  By recognizing and replacing your negative thoughts about sleep with more accurate, positive thoughts, you will be less anxious and frustrated about your sleep. A more realistic and positive attitude about sleep will allow you to relax and sleep more easily through the night.         There are several important steps involved in changing your unhelpful and negative thoughts about sleep:      Examples of Healthy Sleep Thoughts    My work will not suffer much if I have a poor night's sleep.    I'm probably getting more sleep than I think.    Other things than my sleep affect my daytime functioning.    Because I didn't sleep well last night, I am more likely to sleep well tonight because of increased sleep drive that leads to deeper sleep.    Sleep requirements vary from one person to another.    If I don't sleep well, most of the time the worst that can happen is that my mood might not be as bright the next day.    If I awaken after about 5 hours of sleep, I have gotten the core sleep I need for the day.    I'm more likely to fall asleep the longer I've been awake    I'm more likely to fall asleep as my body temperature begins to decrease through the night.    My body's wakefulness system takes charge during the day to promote daytime functioning.    These sleep skills have worked for others, and they can work for me.    Other Recommendations for Changing Beliefs and Attitudes   About Your Sleep    Keep Realistic Expectations     There is a widespread belief that 8 hours of sleep is necessary to feel refreshed and function well during the day. Many believe that good sleep means never waking up at night.  Others come to expect that they should always wake up in  the morning feeling full of energy.  Concerns may arise when your actual sleep falls short of these expectations. Try to avoid placing undue pressure on yourself to achieve certain sleep levels.  It only increases anxiety about sleeping.  Focus on quality sleep not quantity of sleep.    Revise Your Thoughts about the Causes of Insomnia    There is a natural tendency to attribute our sleep problems completely to external factors such as a chemical imbalance, pain, aging or things over which we may have little control.  Although these factors may contribute to your insomnia, research shows CBT-I is beneficial even if they are present.    Don't Blame Insomnia for All Daytime Impairments      Many individuals blame insomnia for every symptom or concern they experience during the day from fatigue to lack of concentration. Though poor sleep may produce some of these symptoms, it us usually untrue that all daytime impairment is attributable to insomnia.  It is more often that other factors such as stress and co-occurring medical problems contribute more to how you feel during the day.      Don't Catastrophize      Catastrophic thinking means making a sleep mountain out of a molehill.  Some people believe poor sleep will have catastrophic consequences to their physical health, mental health and appearance. Others see insomnia as a complete loss of control.  These perceived consequences of insomnia often prompt people to seek medication or other treatment.  Keep in mind insomnia can be very unpleasant but for the most part is not dangerous.    Don't Focus on Sleep     Some people reduce their activity level because of poor sleep.  Although sleep is a necessary part of life, don't make it the focus of your thoughts and concern. Trust that if you engage in health sleep habits, your body will give you the sleep it needs.  Make sure you continue your normal activities despite your insomnia.    Never Try to Sleep      Of all the  habits the most important one is this:  Never try to force yourself to sleep.  Doing so usually backfires and makes things worse. Instead, focus on keeping to your prescribed sleep schedule, getting up at the same time every day and using the bed only for sleep.     What are Your Three Top Negative Thoughts About Your Sleep?    Negative Thought                        Resulting Feeling                   Alternate Thought  I must get 8 hours of sleep                   Fear                      People need different amounts   night or my health will suffer                                              of sleep and sleep time varies                                                                                             Somewhat from night to night  1._____________________________________________________________________    2._____________________________________________________________________    3._____________________________________________________________________    Portions of this module contains concepts and edited content developed by   Jus Vazquez PsyD, SARAN, ZURDO and used with permission.

## 2024-10-31 ENCOUNTER — TELEPHONE (OUTPATIENT)
Dept: FAMILY MEDICINE | Facility: CLINIC | Age: 64
End: 2024-10-31
Payer: COMMERCIAL

## 2024-10-31 LAB
ANION GAP SERPL CALCULATED.3IONS-SCNC: 9 MMOL/L (ref 7–15)
BUN SERPL-MCNC: 13.8 MG/DL (ref 8–23)
CALCIUM SERPL-MCNC: 9.5 MG/DL (ref 8.8–10.4)
CHLORIDE SERPL-SCNC: 105 MMOL/L (ref 98–107)
CHOLEST SERPL-MCNC: 200 MG/DL
CREAT SERPL-MCNC: 0.99 MG/DL (ref 0.51–0.95)
CREAT UR-MCNC: 191 MG/DL
EGFRCR SERPLBLD CKD-EPI 2021: 63 ML/MIN/1.73M2
FASTING STATUS PATIENT QL REPORTED: ABNORMAL
FASTING STATUS PATIENT QL REPORTED: ABNORMAL
GLUCOSE SERPL-MCNC: 104 MG/DL (ref 70–99)
HCO3 SERPL-SCNC: 26 MMOL/L (ref 22–29)
HDLC SERPL-MCNC: 53 MG/DL
LDLC SERPL CALC-MCNC: 106 MG/DL
MICROALBUMIN UR-MCNC: <12 MG/L
MICROALBUMIN/CREAT UR: NORMAL MG/G{CREAT}
NONHDLC SERPL-MCNC: 147 MG/DL
POTASSIUM SERPL-SCNC: 4.2 MMOL/L (ref 3.4–5.3)
SODIUM SERPL-SCNC: 140 MMOL/L (ref 135–145)
TRIGL SERPL-MCNC: 204 MG/DL

## 2024-10-31 NOTE — TELEPHONE ENCOUNTER
Retail Pharmacy Prior Authorization Team   Phone: [phone]    PRIOR AUTHORIZATION DENIED    Medication: WEGOVY 0.25 MG/0.5ML SC SOAJ  Insurance Company: [employer] - Phone [phone] Fax [phone]  Denial Date: 10/31/2024  Denial Reason(s): MUST PROVIDE DOCUMENTATION OF TWO OR MORE APPTS DISCUSSING WEIGHT LOSS WITHIN THE PAST 6 MONTHS      Appeal Information: IF THE PROVIDER WOULD LIKE TO APPEAL THIS DECISION PLEASE PROVIDE THE PA TEAM WITH A LETTER OF MEDICAL NECESSITY      Patient Notified: NO

## 2024-10-31 NOTE — PROGRESS NOTES
The longitudinal plan of care for the diagnosis(es)/condition(s) as documented were addressed during this visit. Due to the added complexity in care, I will continue to support her in the subsequent management and with ongoing continuity of care.    30 minutes spent by me on the date of the encounter doing chart review, patient visit, documentation, and discussion with other provider(s)     1. Prediabetes  Her A1c is 6.0.  She will continue to work on diet and exercise.  - Hemoglobin A1c  - Albumin Random Urine Quantitative with Creat Ratio    2. Chronic systolic heart failure (H)    - Lipid Panel  - Basic metabolic panel  - Albumin Random Urine Quantitative with Creat Ratio    3. Chronic atrial fibrillation (H)  We will refill the Xarelto for a year. - Basic metabolic panel  - rivaroxaban ANTICOAGULANT (XARELTO ANTICOAGULANT) 20 MG TABS tablet; Take 1 tablet (20 mg) by mouth daily (with dinner).  Dispense: 90 tablet; Refill: 3    4. Flexural eczema (Primary)   I did a test claim for Protopic but it was going to be too expensive.  Will do a P3 steroid and have her also use Eucerin or similar cream.  - triamcinolone (KENALOG) 0.5 % external ointment; Apply twice a day for eczema.  Dispense: 30 g; Refill: 3    5. Chronic systolic congestive heart failure (H)    - lisinopril (ZESTRIL) 5 MG tablet; Take 1 tablet (5 mg) by mouth daily.  Dispense: 90 tablet; Refill: 3  - metoprolol succinate ER (TOPROL XL) 100 MG 24 hr tablet; Take 1.5 tablets (150 mg) by mouth daily. TAKE 1 TABLET(100 MG) BY MOUTH DAILY. MAY TAKE ANOTHER DOSE AFTER 4 HOURS IF HEART RATE REMAINS GREATER THAN 120 DAILY  Dispense: 135 tablet; Refill: 3    6. Primary insomnia  We prescribed mirtazapine and trazodone in the past and neither were helpful.  She is actually taken some friends Seroquel but I pointed out this was not a great idea.  Will try medium dose of doxepin.  - doxepin (SINEQUAN) 25 MG capsule; Take 1 capsule (25 mg) by mouth at bedtime.   Dispense: 90 capsule; Refill: 3    7. Morbid obesity (H)  With her high co-pay this will probably be quite expensive.  I suggested she check out some online sites to see if she could get it at the discount.  I printed off some information for her.  But it was still mild I I: Patient is following up on a few different things.  - Semaglutide-Weight Management (WEGOVY) 0.25 MG/0.5ML pen; Inject 0.25 mg subcutaneously once a week.  Dispense: 2 mL; Refill: 0    8. Need for prophylactic vaccination and inoculation against influenza         Subjective   Michelle is a 64 year old, presenting for the following health issues:  Medication Request (Cream for eczema and Wegovy ), Recheck Medication, and Imm/Inj (Flu Shot)      10/30/2024     2:07 PM   Additional Questions   Roomed by Mao   Accompanied by self         10/30/2024    Information    services provided? No        HPI     She does have atrial fibrillation and she is on Xarelto for that as well as Metoprolol for rate control.    She has endometrial hyperplasia with atypia and she is followed by GYN for that.    She has insomnia and she has tried mirtazapine trazodone Benadryl Ambien and Seroquel in the past.  These have mostly been ineffective or else caused her to be quite groggy the next day.  Which works better than anything, in her opinion, is the Seroquel but even that made her groggy the next day and I pointed out it is not a great medication for insomnia.    She is interested in trying a GLP-1 for weight loss.  Her BMI is 39 and she does have prediabetes so I think this makes a lot of sense.  Cost will be a concern because she has a fairly high co-pay.  Past Medical History:   Diagnosis Date    Anemia     Atrial fibrillation     Atrial flutter (H)     Current moderate episode of major depressive disorder without prior episode (H) 01/10/2023    Diastolic heart failure (H)     Endometrial hyperplasia with atypia        Last Comprehensive  Metabolic Panel:  Lab Results   Component Value Date     01/16/2024    POTASSIUM 4.1 01/16/2024    CHLORIDE 100 01/16/2024    CO2 30 (H) 01/16/2024    ANIONGAP 10 01/16/2024     (H) 01/16/2024    BUN 12.0 01/16/2024    CR 0.88 01/16/2024    GFRESTIMATED 73 01/16/2024    JOSEMANUEL 9.8 01/16/2024        Hemoglobin A1C   Date Value Ref Range Status   10/30/2024 6.0 (H) 0.0 - 5.6 % Final     Comment:     Normal <5.7%   Prediabetes 5.7-6.4%    Diabetes 6.5% or higher     Note: Adopted from ADA consensus guidelines.   01/16/2024 6.1 (H) 0.0 - 5.6 % Final     Comment:     Normal <5.7%   Prediabetes 5.7-6.4%    Diabetes 6.5% or higher     Note: Adopted from ADA consensus guidelines.   01/10/2023 6.2 (H) 0.0 - 5.6 % Final     Comment:     Normal <5.7%   Prediabetes 5.7-6.4%    Diabetes 6.5% or higher     Note: Adopted from ADA consensus guidelines.   01/05/2015 5.7 4.1 - 5.7 % Final        Recent Labs   Lab Test 01/16/24  1307 01/10/23  1437   CHOL 209* 216*   HDL 64 54   * 128*   TRIG 193* 168*        BP Readings from Last 3 Encounters:   10/30/24 118/81   05/29/24 111/84   05/13/24 97/51       Current Outpatient Medications   Medication Sig Dispense Refill    doxepin (SINEQUAN) 25 MG capsule Take 1 capsule (25 mg) by mouth at bedtime. 90 capsule 3    furosemide (LASIX) 40 MG tablet TAKE 1 TABLET(40 MG) BY MOUTH DAILY AS NEEDED Strength: 40 mg 90 tablet 3    lisinopril (ZESTRIL) 5 MG tablet Take 1 tablet (5 mg) by mouth daily. 90 tablet 3    metoprolol succinate ER (TOPROL XL) 100 MG 24 hr tablet Take 1.5 tablets (150 mg) by mouth daily. TAKE 1 TABLET(100 MG) BY MOUTH DAILY. MAY TAKE ANOTHER DOSE AFTER 4 HOURS IF HEART RATE REMAINS GREATER THAN 120 DAILY 135 tablet 3    rivaroxaban ANTICOAGULANT (XARELTO ANTICOAGULANT) 20 MG TABS tablet Take 1 tablet (20 mg) by mouth daily (with dinner). 90 tablet 3    Semaglutide-Weight Management (WEGOVY) 0.25 MG/0.5ML pen Inject 0.25 mg subcutaneously once a week. 2 mL 0     "triamcinolone (KENALOG) 0.5 % external ointment Apply twice a day for eczema. 30 g 3    acetaminophen (TYLENOL) 325 MG tablet Take 2 tablets (650 mg) by mouth every 6 hours as needed for mild pain 24 tablet 0    alcohol swab prep pads Use to swab area of injection/chrissie as directed. 100 each 3    cholestyramine (QUESTRAN) 4 g packet Take 1 packet (4 g) by mouth 3 times daily (with meals) 60 packet 11    ibuprofen (ADVIL/MOTRIN) 600 MG tablet Take 1 tablet (600 mg) by mouth every 6 hours as needed for other (mild and/or inflammatory pain.) 12 tablet 0    insulin pen needle (32G X 4 MM) 32G X 4 MM miscellaneous Use 1 pen needles daily or as directed. 100 each 3    Multiple Vitamins-Minerals (HAIR SKIN AND NAILS FORMULA PO) Take 2 tablets by mouth at bedtime      order for DME Equipment being ordered: pulse oximeter 1 Device 0     No current facility-administered medications for this visit.         PMHX/PSHX/MEDS/ALLERGIES/SHX/FHX reviewed and updated in Epic.   General: No fevers, chills   Head: No headache   CV: No chest pain or palpitations.   Resp: No shortness of breath. No cough. No hemoptysis.   GI: No nausea or vomiting.  No constipation or diarrhea.  Objective: /81   Pulse 80   Temp 97.6  F (36.4  C) (Oral)   Resp 16   Ht 1.778 m (5' 10\")   Wt 122.9 kg (271 lb)   SpO2 99%   BMI 38.88 kg/m     No LMP recorded. Patient is postmenopausal.   Gen: Well nourished and in NAD   CV: RRR - no murmurs, rubs, or gallups  Pulm: Clear to auscultation without wheezing or crackles  ABD: obese, soft, nontender, BS intact  Extrem: no cyanosis, edema or clubbing   Psych: Euthymic     Signed Electronically by: Ari Cruz MD    "

## 2024-10-31 NOTE — TELEPHONE ENCOUNTER
Christiano Cruz,     There was a PA initiated for the medication Wegovy 0.25 mg/0.5 ml that was denied. Please review and see if you would like to send an Appeal letter or see denial alternatives if any within the denial letter.     Appeal Letter:   In Epic under 'letters' tab in 'chart review' click on 'new' to start a letter and then find the letter of medical necessity template under '[facility] medical necessity' or enter ID#: [id number].   2. After you have written the letter we will need to reach out to the Prior Auth team to have them submit the APPEAL LETTER to insurance and go from there depending on their decision.   3. Route encounter to P [facility] PA MED([id number]) after you have completed the APPEAL LETTER.   4. Close Encounter when done.     Other Actions:   If you choose not to APPEAL the medications and would like to send alternative, have other action items regarding this medication or etc.  Route encounter to your assigned PCS to call pt and notify pt of PLAN going forward.   Close Encounter when done.        Elton Hughes CMA on 10/31/2024 at 12:15 PM

## 2024-10-31 NOTE — LETTER
2024    To:   [facility] - Phone [phone] Fax [phone]     RE: Michelle Shetty  [address]  [facility] [address]  : 1960  MRN: [id number]  Policy #: unknown  Case/Reference: ID#: [id number].     To Whom It May Concern,    I am writing on behalf of my patient, Michelle Shetty to document the medical necessity of Wegovy for the treatment of morbid obesity. This letter provides information about the patient's medical history and diagnosis and a statement summarizing my treatment rationale.     Summary of Patient History and Diagnosis  This patient has a BMI of 39.  She has morbid conditions including prediabetes, atrial fibrillation, and previous heart failure.      Treatment Rationale  GLP-1 therapy will reduce her weight as well as address her prediabetes and lower her overall risk of significant adverse cardiac outcomes including heart failure and myocardial infarction.    Duration  12 months      Summary  In summary, Wegovy is medically necessary for this patient s medical condition. Please call my office at [phone] if I can provide you with any additional information to approve my request. I look forward to receiving your timely response and approval of this request.     Sincerely,    Ari Cruz MD

## 2024-11-01 ENCOUNTER — TELEPHONE (OUTPATIENT)
Dept: FAMILY MEDICINE | Facility: CLINIC | Age: 64
End: 2024-11-01
Payer: COMMERCIAL

## 2024-11-01 DIAGNOSIS — I50.22 CHRONIC SYSTOLIC CONGESTIVE HEART FAILURE (H): ICD-10-CM

## 2024-11-01 RX ORDER — METOPROLOL SUCCINATE 100 MG/1
200 TABLET, EXTENDED RELEASE ORAL DAILY
Qty: 180 TABLET | Refills: 3 | Status: SHIPPED | OUTPATIENT
Start: 2024-11-01

## 2024-11-04 NOTE — TELEPHONE ENCOUNTER
Francisco [place],   Can I have you call pt to notify her of message below.     Elton Hughes, RMA on 11/4/2024 at 1:39 PM          Copying Dr. Cruz's note for documentation purposes:    Please let patient know that she needs to be seen twice within 6 months by me and have had a discussion of weight loss and she needs to be seen by dietitian follow-up with the dietitian before her insurance will pay for Wegovy.

## 2024-11-06 NOTE — TELEPHONE ENCOUNTER
Spoke to pt regarding this, states that she is paying a lot for her insurance. She will decide if she wants to go thru with getting this medication covered/ mvy

## 2024-11-08 ENCOUNTER — TELEPHONE (OUTPATIENT)
Dept: FAMILY MEDICINE | Facility: CLINIC | Age: 64
End: 2024-11-08
Payer: COMMERCIAL

## 2024-11-08 NOTE — TELEPHONE ENCOUNTER
Prior Authorization Retail Medication Request    Medication/Dose: Semaglutide-Weight Management (WEGOVY) 0.25 MG/0.5ML pen  Diagnosis and ICD code (if different than what is on RX):  [E66.01]   New/renewal/insurance change PA/secondary ins. PA:  Previously Tried and Failed:    Rationale:      Insurance   Primary:     [employer]     Insurance ID:  [id number]     Secondary (if applicable):  Insurance ID:      Pharmacy Information (if different than what is on RX)  Name:    [id number], [place] - [address] [facility] [address]     Phone:  [phone]   Fax:[phone]     Clinic Information  Preferred routing pool for dept communication:

## 2024-11-08 NOTE — TELEPHONE ENCOUNTER
Pt insurance does not cover this medication Semaglutide-Weight Management (WEGOVY) 0.25 MG/0.5ML pen . Is asking for a PA. Would you like to start PA or ALTERNATIVE.      Barbara Pinto MA

## 2024-11-12 NOTE — TELEPHONE ENCOUNTER
Duplicate.     This PA was denied on 10/31/24. Please see telephone encounter on 10/31/24 for more documentation.     CONNOR Agosto on 11/12/2024 at 1:24 PM

## 2025-01-07 NOTE — PROGRESS NOTES
Virtual Visit Details    Type of service:  Video Visit     Originating Location (pt. Location): Home    Distant Location (provider location):  On-site  Platform used for Video Visit: [facility]      Gynecologic Oncology Return Visit Note        RE: Michelle Shetty  : 1960  HANG:  25       CC: Atypical Endometrial Hyperplasia    HPI:  Michelle Shetty is a 64 year old woman with a diagnosis of Atypical Endometrial Hyperplasia, A.Fib, systolic heart failure. She presents for follow up of management.    Since her last visit she reports several months of irregular bleeding last summer, then it improved and she had another episode last month. She is not sure if this is vaginal or anal bleeding, sometimes it is old blood. She wanted to have the hysterectomy but found out she had high charges from [employer] and it was very difficulty to pay for her health care, even monthly payment plan is challenging.    Oncology History:  She reports family history of her mother who  after undergoing hysterectomy for some cancer, maybe ovarian cancer. Patient has a lot of concerns about being able to safely undergo surgery.      2020: PMB, anticoagulated for A. flutter. Onset of post menopausal bleeding starting in 2020.       Patient is referred by Dr. Cagle with the below noted work up completed.     20 Pelvic US 13.3 x 6.2 8.2 cm, 3 fibroids 3.8, 4.8 , 1.4 cm, endometrial stripe 11mm     10/20/2020 Endometrial Biopsy: Endometrial polyp with rare focus of atypical endometrial hyperplasia     12/10/2020 Mirena IUD placed    21 TVUS:   1. Borderline elevated endometrial thickness measuring 5 mm.  2. Focal heterogeneous fibroid in the posterior body of uterus measuring up to 2.9 cm.  3. Appropriate positioning of intrauterine device.  4. Nonvisualization of the left ovary.     Medical History is notable for  Atrial fibrillation-rate controled on Metoprolol and Lisinopril and is on Xarelto.   Chronic Systolic Heart  Failure: Echo 2017  EF 55% - takes furosemide    Depression: Mirtazepine      22 Endometrial Biopsy  Final Diagnosis  Endometrium, biopsy:  - Endocervical tissue with benign microglandular hyperplasia  - Occasional fragments of inactive endometrium with features consistent with exogenous progestin use  - Negative for hyperplasia or atypia    22 Pelvic US  IMPRESSION:      1. Endometrium measures 6 mm.  2. Uterine fibroid partially visualized.   3. Relatively unchanged positioning of the intrauterine device  compared to prior ultrasound 2021.  4. Left ovary is not demonstrated.    22 Pelvic US  IMPRESSION:   1. Normal endometrial thickness.  2. Mildly decreased size of the intramural fibroid measuring 2.5 cm.  3. IUD remains in the lower uterine segment.    23 Pelvic US  IMPRESSION:   1. Normal endometrial thickness, 3 mm.  2. Mildly decreased size of the intramural fibroid measuring 2.5 cm.  3. IUD remains in the lower uterine segment.    24 Pelvic US  IMPRESSION:    1.  Left ovary is not visualized.  2.  Endometrial thickness of 0.6 cm. Consider endometrial biopsy if  clinically indicated.  3.  A few uterine fibroids measuring up to 2.7 cm.  4.  intrauterine device in the lower uterine segment    24 Surgery : [facility] under ultrasound guidance, Mirena IUD removal and reinsertion  Pathology: Endocervical tissue with benign microglandular hyperplasia, inactive endometrium with pseudo decidualized stroma    OBGYN history and Health Maintenance:     Last Pap Smear: 10/2020, NILM, HPV Negative  Last Mammogram:  None recent, ordered   Last Colonoscopy:  None recent     Past Medical History:    Past Medical History:   Diagnosis Date    Anemia     Atrial fibrillation     Atrial flutter (H)     Current moderate episode of major depressive disorder without prior episode (H) 01/10/2023    Diastolic heart failure (H)     Endometrial hyperplasia with atypia        Past Surgical  History:    Past Surgical History:   Procedure Laterality Date    APPENDECTOMY      BREAST SURGERY      benign tumor left breast    CARDIOVERSION      1/16/15 for a flutter    CHOLECYSTECTOMY      CHOLECYSTECTOMY      DILATION AND CURETTAGE, WITH ULTRASOUND GUIDANCE N/A 5/13/2024    Procedure: DILATION AND CURETTAGE, UTERUS, WITH ULTRASOUND GUIDANCE;  Surgeon: Rossana Aguirre MD;  Location: [facility] OR    FRACTURE SURGERY      ankle    H LAP ABLAT UTERINE FIBROIDS W/INTRAOP US GUIDE      INSERT INTRAUTERINE DEVICE N/A 5/13/2024    Procedure: MIRENA IUD REPLACEMENT;  Surgeon: Rossana Aguirre MD;  Location: [facility] OR    MYOMECTOMY ABDOMINAL APPROACH      OTHER SURGICAL HISTORY      ablationUterine Fibroids         Health Maintenance Due   Topic Date Due    HF ACTION PLAN  Never done    DEPRESSION ACTION PLAN  Never done    COLORECTAL CANCER SCREENING  Never done    ZOSTER IMMUNIZATION (2 of 3) 10/30/2015    YEARLY PREVENTIVE VISIT  01/05/2016    LUNG CANCER SCREENING  05/11/2018    DEPRESSION 12 MO INDEX REPEAT PHQ-9  11/17/2024    DTAP/TDAP/TD IMMUNIZATION (2 - Td or Tdap) 01/05/2025    NICOTINE/TOBACCO CESSATION COUNSELING Q 1 YR  01/16/2025    ALT  01/16/2025       Current Medications:     Current Outpatient Medications   Medication Sig Dispense Refill    doxepin (SINEQUAN) 25 MG capsule Take 1 capsule (25 mg) by mouth at bedtime. 90 capsule 3    furosemide (LASIX) 40 MG tablet TAKE 1 TABLET(40 MG) BY MOUTH DAILY AS NEEDED Strength: 40 mg 90 tablet 3    lisinopril (ZESTRIL) 5 MG tablet Take 1 tablet (5 mg) by mouth daily. 90 tablet 3    metoprolol succinate ER (TOPROL XL) 100 MG 24 hr tablet Take 2 tablets (200 mg) by mouth daily. 180 tablet 3    Multiple Vitamins-Minerals (HAIR SKIN AND NAILS FORMULA PO) Take 2 tablets by mouth at bedtime      order for DME Equipment being ordered: pulse oximeter 1 Device 0    rivaroxaban ANTICOAGULANT (XARELTO ANTICOAGULANT) 20 MG TABS tablet Take 1 tablet (20 mg) by mouth daily (with  dinner). 90 tablet 3    Semaglutide-Weight Management (WEGOVY) 0.25 MG/0.5ML pen Inject 0.25 mg subcutaneously once a week. 2 mL 0    triamcinolone (KENALOG) 0.5 % external ointment Apply twice a day for eczema. 30 g 3         Allergies:        Allergies   Allergen Reactions    Amoxicillin Anaphylaxis     Pt doesn't remember this and thinks that its okay    Codeine         Social History:     Social History     Tobacco Use    Smoking status: Some Days     Current packs/day: 0.25     Types: Cigarettes    Smokeless tobacco: Never    Tobacco comments:     5-6 cigarettes daily    Substance Use Topics    Alcohol use: Yes     Alcohol/week: 1.0 standard drink of alcohol     Types: 1 Glasses of wine per week     Comment: 1 drink a day       History   Drug Use     Comment: Once in while will smoke Marijuana         Family History:     The patient's family history is notable for:    Family History   Problem Relation Age of Onset    Diabetes Mother     Hypertension Mother     Ovarian Cancer Mother          age 79 after hysterectomy for cancer    Arrhythmia Father         atrial fibrillation    Cerebrovascular Disease Father         2017    Myocardial Infarction Father 60    Heart Disease Father     Atrial Flutter Father     No Known Problems Sister     Arrhythmia Brother     Atrial Flutter Brother     No Known Problems Maternal Grandmother     No Known Problems Maternal Grandfather     No Known Problems Paternal Grandmother     No Known Problems Paternal Grandfather     No Known Problems Daughter     No Known Problems Son     Asthma Child     No Known Problems Maternal Half-Brother     No Known Problems Maternal Half-Sister     No Known Problems Paternal Half-Brother     No Known Problems Paternal Half-Sister     No Known Problems Niece     No Known Problems Nephew     No Known Problems Cousin     Breast Cancer Other     Anesthesia Reaction No family hx of     Bleeding Disorder No family hx of        Physical Exam:    Virtual visit  General Appearance: healthy and alert, no distress  Psychiatric: Appropriate mood and affect.     Assessment and Plan:  Michelle Shetty is a 64 year old woman with history of atypical endometrial hyperplasia arising in a polyp diagnosed in 10/2020. She is prefers not to undergo surgery due to family responsibilities and concern about morbidity of surgery. She currently is under treatment with Mirena IUD, reports vaginal vs rectal bleeding.    She is experiencing significant financial burden from health related costs    She will reach out to her PCP and discuss work up for colon cancer, recommend she have a colonoscopy  She is on anticoagulation which likely contributes to episodes of vaginal bleeding. She is eligible for a total hysterectomy and she will reach out to her insurance to determine cost and best site for her surgery.  She will contact our clinic to schedule a follow up visit and physical exam      Rossana Aguirre M.D., MPH,  F.A.C.O.G.  Division of Gynecologic Oncology  [facility]  [phone]      Time: total time spent today, 15 , including preparation, review of outside records, face to face counseling, and documentation.   CC  Patient Care Team:  Ari Cruz MD as PCP - General (Family Practice)  Rossana Aguirre MD as MD (Gynecologic Oncology)  Stella Cagle MD (Inactive) as Referring Physician (OB/Gyn)  Rossana Aguirre MD as Assigned Cancer Care Provider  Ari Cruz MD as Assigned PCP  Anastacio Atkinson MD as MD (Cardiovascular Disease)  SELF, REFERRED

## 2025-01-09 ENCOUNTER — VIRTUAL VISIT (OUTPATIENT)
Dept: ONCOLOGY | Facility: CLINIC | Age: 65
End: 2025-01-09
Attending: OBSTETRICS & GYNECOLOGY
Payer: COMMERCIAL

## 2025-01-09 VITALS — HEIGHT: 70 IN | BODY MASS INDEX: 38.37 KG/M2 | WEIGHT: 268 LBS

## 2025-01-09 DIAGNOSIS — N85.02 ENDOMETRIAL HYPERPLASIA WITH ATYPIA: Primary | ICD-10-CM

## 2025-01-09 ASSESSMENT — PAIN SCALES - GENERAL: PAINLEVEL_OUTOF10: NO PAIN (0)

## 2025-01-09 NOTE — NURSING NOTE
Is the patient currently in the state of [place]? YES    Visit mode: VIDEO    If the visit is dropped, the patient can be reconnected by:VIDEO VISIT: Send to e-mail at: [email or web address]    Will anyone else be joining the visit? NO  (If patient encounters technical issues they should call [phone])    Are changes needed to the allergy or medication list? No    Are refills needed on medications prescribed by this physician? NO    Rooming Documentation:  Questionnaire(s) completed    Reason for visit: Video Visit (Follow up )    Susu LANDA

## 2025-01-09 NOTE — LETTER
2025      Michelle Shetty  [address]  [place] [address]      Dear Colleague,    Thank you for referring your patient, Michelle Shetty, to the [facility]. Please see a copy of my visit note below.    Virtual Visit Details    Type of service:  Video Visit     Originating Location (pt. Location): Home    Distant Location (provider location):  On-site  Platform used for Video Visit: [facility]      Gynecologic Oncology Return Visit Note        RE: Michelle Shetty  : 1960  HANG:  25       CC: Atypical Endometrial Hyperplasia    HPI:  Michelle Shetty is a 64 year old woman with a diagnosis of Atypical Endometrial Hyperplasia, A.Fib, systolic heart failure. She presents for follow up of management.    Since her last visit she reports several months of irregular bleeding last summer, then it improved and she had another episode last month. She is not sure if this is vaginal or anal bleeding, sometimes it is old blood. She wanted to have the hysterectomy but found out she had high charges from [employer] and it was very difficulty to pay for her health care, even monthly payment plan is challenging.    Oncology History:  She reports family history of her mother who  after undergoing hysterectomy for some cancer, maybe ovarian cancer. Patient has a lot of concerns about being able to safely undergo surgery.      2020: PMB, anticoagulated for A. flutter. Onset of post menopausal bleeding starting in 2020.       Patient is referred by Dr. Cagle with the below noted work up completed.     20 Pelvic US 13.3 x 6.2 8.2 cm, 3 fibroids 3.8, 4.8 , 1.4 cm, endometrial stripe 11mm     10/20/2020 Endometrial Biopsy: Endometrial polyp with rare focus of atypical endometrial hyperplasia     12/10/2020 Mirena IUD placed    21 TVUS:   1. Borderline elevated endometrial thickness measuring 5 mm.  2. Focal heterogeneous fibroid in the posterior body of uterus measuring  up to 2.9 cm.  3. Appropriate positioning of intrauterine device.  4. Nonvisualization of the left ovary.     Medical History is notable for  Atrial fibrillation-rate controled on Metoprolol and Lisinopril and is on Xarelto.   Chronic Systolic Heart Failure: Echo 2017  EF 55% - takes furosemide    Depression: Mirtazepine      22 Endometrial Biopsy  Final Diagnosis  Endometrium, biopsy:  - Endocervical tissue with benign microglandular hyperplasia  - Occasional fragments of inactive endometrium with features consistent with exogenous progestin use  - Negative for hyperplasia or atypia    22 Pelvic US  IMPRESSION:      1. Endometrium measures 6 mm.  2. Uterine fibroid partially visualized.   3. Relatively unchanged positioning of the intrauterine device  compared to prior ultrasound 2021.  4. Left ovary is not demonstrated.    22 Pelvic US  IMPRESSION:   1. Normal endometrial thickness.  2. Mildly decreased size of the intramural fibroid measuring 2.5 cm.  3. IUD remains in the lower uterine segment.    23 Pelvic US  IMPRESSION:   1. Normal endometrial thickness, 3 mm.  2. Mildly decreased size of the intramural fibroid measuring 2.5 cm.  3. IUD remains in the lower uterine segment.    24 Pelvic US  IMPRESSION:    1.  Left ovary is not visualized.  2.  Endometrial thickness of 0.6 cm. Consider endometrial biopsy if  clinically indicated.  3.  A few uterine fibroids measuring up to 2.7 cm.  4.  intrauterine device in the lower uterine segment    24 Surgery : [facility] under ultrasound guidance, Mirena IUD removal and reinsertion  Pathology: Endocervical tissue with benign microglandular hyperplasia, inactive endometrium with pseudo decidualized stroma    OBGYN history and Health Maintenance:     Last Pap Smear: 10/2020, NILM, HPV Negative  Last Mammogram:  None recent, ordered   Last Colonoscopy:  None recent     Past Medical History:    Past Medical History:   Diagnosis Date      Anemia      Atrial fibrillation      Atrial flutter (H)      Current moderate episode of major depressive disorder without prior episode (H) 01/10/2023     Diastolic heart failure (H)      Endometrial hyperplasia with atypia        Past Surgical History:    Past Surgical History:   Procedure Laterality Date     APPENDECTOMY       BREAST SURGERY      benign tumor left breast     CARDIOVERSION      1/16/15 for a flutter     CHOLECYSTECTOMY       CHOLECYSTECTOMY       DILATION AND CURETTAGE, WITH ULTRASOUND GUIDANCE N/A 5/13/2024    Procedure: DILATION AND CURETTAGE, UTERUS, WITH ULTRASOUND GUIDANCE;  Surgeon: Rossana Aguirre MD;  Location: [facility] OR     FRACTURE SURGERY      ankle     H LAP ABLAT UTERINE FIBROIDS W/INTRAOP US GUIDE       INSERT INTRAUTERINE DEVICE N/A 5/13/2024    Procedure: MIRENA IUD REPLACEMENT;  Surgeon: Rossana Aguirre MD;  Location: [facility] OR     MYOMECTOMY ABDOMINAL APPROACH       OTHER SURGICAL HISTORY      ablationUterine Fibroids         Health Maintenance Due   Topic Date Due     HF ACTION PLAN  Never done     DEPRESSION ACTION PLAN  Never done     COLORECTAL CANCER SCREENING  Never done     ZOSTER IMMUNIZATION (2 of 3) 10/30/2015     YEARLY PREVENTIVE VISIT  01/05/2016     LUNG CANCER SCREENING  05/11/2018     DEPRESSION 12 MO INDEX REPEAT PHQ-9  11/17/2024     DTAP/TDAP/TD IMMUNIZATION (2 - Td or Tdap) 01/05/2025     NICOTINE/TOBACCO CESSATION COUNSELING Q 1 YR  01/16/2025     ALT  01/16/2025       Current Medications:     Current Outpatient Medications   Medication Sig Dispense Refill     doxepin (SINEQUAN) 25 MG capsule Take 1 capsule (25 mg) by mouth at bedtime. 90 capsule 3     furosemide (LASIX) 40 MG tablet TAKE 1 TABLET(40 MG) BY MOUTH DAILY AS NEEDED Strength: 40 mg 90 tablet 3     lisinopril (ZESTRIL) 5 MG tablet Take 1 tablet (5 mg) by mouth daily. 90 tablet 3     metoprolol succinate ER (TOPROL XL) 100 MG 24 hr tablet Take 2 tablets (200 mg) by mouth daily. 180 tablet 3     Multiple  Vitamins-Minerals (HAIR SKIN AND NAILS FORMULA PO) Take 2 tablets by mouth at bedtime       order for DME Equipment being ordered: pulse oximeter 1 Device 0     rivaroxaban ANTICOAGULANT (XARELTO ANTICOAGULANT) 20 MG TABS tablet Take 1 tablet (20 mg) by mouth daily (with dinner). 90 tablet 3     Semaglutide-Weight Management (WEGOVY) 0.25 MG/0.5ML pen Inject 0.25 mg subcutaneously once a week. 2 mL 0     triamcinolone (KENALOG) 0.5 % external ointment Apply twice a day for eczema. 30 g 3         Allergies:        Allergies   Allergen Reactions     Amoxicillin Anaphylaxis     Pt doesn't remember this and thinks that its okay     Codeine         Social History:     Social History     Tobacco Use     Smoking status: Some Days     Current packs/day: 0.25     Types: Cigarettes     Smokeless tobacco: Never     Tobacco comments:     5-6 cigarettes daily    Substance Use Topics     Alcohol use: Yes     Alcohol/week: 1.0 standard drink of alcohol     Types: 1 Glasses of wine per week     Comment: 1 drink a day       History   Drug Use     Comment: Once in while will smoke Marijuana         Family History:     The patient's family history is notable for:    Family History   Problem Relation Age of Onset     Diabetes Mother      Hypertension Mother      Ovarian Cancer Mother          age 79 after hysterectomy for cancer     Arrhythmia Father         atrial fibrillation     Cerebrovascular Disease Father         2017     Myocardial Infarction Father 60     Heart Disease Father      Atrial Flutter Father      No Known Problems Sister      Arrhythmia Brother      Atrial Flutter Brother      No Known Problems Maternal Grandmother      No Known Problems Maternal Grandfather      No Known Problems Paternal Grandmother      No Known Problems Paternal Grandfather      No Known Problems Daughter      No Known Problems Son      Asthma Child      No Known Problems Maternal Half-Brother      No Known Problems Maternal  Half-Sister      No Known Problems Paternal Half-Brother      No Known Problems Paternal Half-Sister      No Known Problems Niece      No Known Problems Nephew      No Known Problems Cousin      Breast Cancer Other      Anesthesia Reaction No family hx of      Bleeding Disorder No family hx of        Physical Exam:   Virtual visit  General Appearance: healthy and alert, no distress  Psychiatric: Appropriate mood and affect.     Assessment and Plan:  Michelle Shetty is a 64 year old woman with history of atypical endometrial hyperplasia arising in a polyp diagnosed in 10/2020. She is prefers not to undergo surgery due to family responsibilities and concern about morbidity of surgery. She currently is under treatment with Mirena IUD, reports vaginal vs rectal bleeding.    She is experiencing significant financial burden from health related costs    She will reach out to her PCP and discuss work up for colon cancer, recommend she have a colonoscopy  She is on anticoagulation which likely contributes to episodes of vaginal bleeding. She is eligible for a total hysterectomy and she will reach out to her insurance to determine cost and best site for her surgery.  She will contact our clinic to schedule a follow up visit and physical exam      Rossana Aguirre M.D., MPH,  F.A.C.O.G.  Division of Gynecologic Oncology  [facility] [facility]  [phone]      Time: total time spent today, 15 , including preparation, review of outside records, face to face counseling, and documentation.   CC  Patient Care Team:  Ari Cruz MD as PCP - General (Family Practice)  Rossana Aguirre MD as MD (Gynecologic Oncology)  Stella Cagle MD (Inactive) as Referring Physician (OB/Gyn)  Rossana Aguirre MD as Assigned Cancer Care Provider  Ari Cruz MD as Assigned PCP  Anastacio Atkinson MD as MD (Cardiovascular Disease)  SELF, REFERRED      Again, thank you for allowing me to participate in the care  of your patient.        Sincerely,        Rossana Aguirre MD    Electronically signed

## 2025-01-13 ENCOUNTER — OFFICE VISIT (OUTPATIENT)
Dept: CARDIOLOGY | Facility: CLINIC | Age: 65
End: 2025-01-13
Payer: COMMERCIAL

## 2025-01-13 VITALS
WEIGHT: 270.8 LBS | HEART RATE: 73 BPM | SYSTOLIC BLOOD PRESSURE: 136 MMHG | BODY MASS INDEX: 38.86 KG/M2 | DIASTOLIC BLOOD PRESSURE: 83 MMHG

## 2025-01-13 DIAGNOSIS — I50.22 CHRONIC SYSTOLIC HEART FAILURE (H): ICD-10-CM

## 2025-01-13 DIAGNOSIS — I48.0 PAF (PAROXYSMAL ATRIAL FIBRILLATION) (H): Primary | ICD-10-CM

## 2025-01-13 PROCEDURE — 99213 OFFICE O/P EST LOW 20 MIN: CPT | Performed by: INTERNAL MEDICINE

## 2025-01-13 PROCEDURE — 99203 OFFICE O/P NEW LOW 30 MIN: CPT | Performed by: INTERNAL MEDICINE

## 2025-01-13 RX ORDER — LISINOPRIL 10 MG/1
10 TABLET ORAL DAILY
Qty: 90 TABLET | Refills: 4 | Status: SHIPPED | OUTPATIENT
Start: 2025-01-13

## 2025-01-13 ASSESSMENT — PAIN SCALES - GENERAL: PAINLEVEL_OUTOF10: NO PAIN (0)

## 2025-01-13 NOTE — NURSING NOTE
Chief Complaint   Patient presents with    New Patient     1/13/2025 visit with Anastacio Atkinson MD for NEW CARDIOLOGY - afib records in at [facility] and [facility] no device per pt. Records complete [facility].       Vitals were taken, medications reconciled, and EKG was performed.    Remington Reed, EMT  3:15 PM

## 2025-01-13 NOTE — PROGRESS NOTES
HPI: Pt is 63 yo female with hx of persistent atrial fibrillation with rate control (toprol) and on anticoagulation who has intermittant SOB, POSADA, orthopnea, PND (occasional) and is currently treated with lisinopril for HTN.  Pt denies presyncope or syncope and reports compliance with low salt diet.  Denies hx of CVA or CP episodes.  Pt does report hx of palpitations with hx of atrial flutter/fibrillation X 10 yrs.  Pt has had electrical cardioversion which was unsuccessful (afib to NSR with reversion to afib).      PAST MEDICAL HISTORY:  Past Medical History:   Diagnosis Date    Anemia     Atrial fibrillation     Atrial flutter (H)     Current moderate episode of major depressive disorder without prior episode (H) 01/10/2023    Diastolic heart failure (H)     Endometrial hyperplasia with atypia        FAMILY HISTORY:  Family History   Problem Relation Age of Onset    Diabetes Mother     Hypertension Mother     Ovarian Cancer Mother          age 79 after hysterectomy for cancer    Arrhythmia Father         atrial fibrillation    Cerebrovascular Disease Father         2017    Myocardial Infarction Father 60    Heart Disease Father     Atrial Flutter Father     No Known Problems Sister     Arrhythmia Brother     Atrial Flutter Brother     No Known Problems Maternal Grandmother     No Known Problems Maternal Grandfather     No Known Problems Paternal Grandmother     No Known Problems Paternal Grandfather     No Known Problems Daughter     No Known Problems Son     Asthma Child     No Known Problems Maternal Half-Brother     No Known Problems Maternal Half-Sister     No Known Problems Paternal Half-Brother     No Known Problems Paternal Half-Sister     No Known Problems Niece     No Known Problems Nephew     No Known Problems Cousin     Breast Cancer Other     Anesthesia Reaction No family hx of     Bleeding Disorder No family hx of        SOCIAL HISTORY:  Social History     Socioeconomic History    Marital  status:      Spouse name: None    Number of children: None    Years of education: None    Highest education level: None   Tobacco Use    Smoking status: Some Days     Current packs/day: 0.25     Types: Cigarettes    Smokeless tobacco: Never    Tobacco comments:     5-6 cigarettes daily    Substance and Sexual Activity    Alcohol use: Yes     Alcohol/week: 1.0 standard drink of alcohol     Types: 1 Glasses of wine per week     Comment: 1 drink a day    Drug use: Yes     Comment: Once in while will smoke Marijuana    Sexual activity: Yes     Partners: Male     Birth control/protection: None     Social Drivers of Health     Financial Resource Strain: Low Risk  (1/16/2024)    Financial Resource Strain     Within the past 12 months, have you or your family members you live with been unable to get utilities (heat, electricity) when it was really needed?: No   Food Insecurity: Low Risk  (1/16/2024)    Food Insecurity     Within the past 12 months, did you worry that your food would run out before you got money to buy more?: No     Within the past 12 months, did the food you bought just not last and you didn t have money to get more?: No   Transportation Needs: Low Risk  (1/16/2024)    Transportation Needs     Within the past 12 months, has lack of transportation kept you from medical appointments, getting your medicines, non-medical meetings or appointments, work, or from getting things that you need?: No   Interpersonal Safety: Low Risk  (10/30/2024)    Interpersonal Safety     Do you feel physically and emotionally safe where you currently live?: Yes     Within the past 12 months, have you been hit, slapped, kicked or otherwise physically hurt by someone?: No     Within the past 12 months, have you been humiliated or emotionally abused in other ways by your partner or ex-partner?: No   Housing Stability: Low Risk  (1/16/2024)    Housing Stability     Do you have housing? : Yes     Are you worried about losing your  housing?: No       CURRENT MEDICATIONS:  Current Outpatient Medications   Medication Sig Dispense Refill    furosemide (LASIX) 40 MG tablet TAKE 1 TABLET(40 MG) BY MOUTH DAILY AS NEEDED Strength: 40 mg 90 tablet 3    lisinopril (ZESTRIL) 5 MG tablet Take 1 tablet (5 mg) by mouth daily. 90 tablet 3    metoprolol succinate ER (TOPROL XL) 100 MG 24 hr tablet Take 2 tablets (200 mg) by mouth daily. 180 tablet 3    Multiple Vitamins-Minerals (HAIR SKIN AND NAILS FORMULA PO) Take 2 tablets by mouth at bedtime      order for DME Equipment being ordered: pulse oximeter 1 Device 0    rivaroxaban ANTICOAGULANT (XARELTO ANTICOAGULANT) 20 MG TABS tablet Take 1 tablet (20 mg) by mouth daily (with dinner). 90 tablet 3    triamcinolone (KENALOG) 0.5 % external ointment Apply twice a day for eczema. 30 g 3    doxepin (SINEQUAN) 25 MG capsule Take 1 capsule (25 mg) by mouth at bedtime. 90 capsule 3    Semaglutide-Weight Management (WEGOVY) 0.25 MG/0.5ML pen Inject 0.25 mg subcutaneously once a week. 2 mL 0       ROS:   Constitutional: No fever, chills, or sweats. No weight gain/loss.   ENT: No visual disturbance, ear ache, epistaxis, sore throat.   Allergies/Immunologic: Negative.   Respiratory: No cough, hemoptysis.   Cardiovascular: As per HPI.   GI: No nausea, vomiting, hematemesis, melena, or hematochezia.   : No urinary frequency, dysuria, or hematuria.   Integument: Negative.   Psychiatric: Negative.   Neuro: Negative.   Endocrinology: Negative.   Musculoskeletal: Negative.    EXAM:  /83 (BP Location: Left arm, Patient Position: Chair, Cuff Size: Adult Large)   Pulse 73   Wt 122.8 kg (270 lb 12.8 oz)   PF 98 L/min   BMI 38.86 kg/m    General: appears comfortable, alert and articulate  Head: normocephalic, atraumatic  Eyes: anicteric sclera, EOMI  Neck: no adenopathy  Orophyarynx: moist mucosa, no lesions, dentition intact  Heart: regular, S1/S2, no murmur, gallop, rub, estimated JVP 9 cm  Lungs: clear, no rales or  "wheezing  Abdomen: soft, non-tender, bowel sounds present, no hepatosplenomegaly  Extremities: no clubbing, cyanosis or edema  Neurological: normal speech and affect, no gross motor deficits    Labs:  CBC RESULTS:  Lab Results   Component Value Date    WBC 8.4 05/13/2024    RBC 5.33 (H) 05/13/2024    HGB 11.9 05/13/2024    HGB 12.3 09/17/2020    HCT 38.5 05/13/2024    HCT 36.9 08/25/2015    MCV 72 (L) 05/13/2024    MCV 71.0 (L) 08/25/2015    MCH 22.3 (L) 05/13/2024    MCH 21.7 (L) 08/25/2015    MCHC 30.9 (L) 05/13/2024    MCHC 30.6 (L) 08/25/2015    RDW 17.6 (H) 05/13/2024     05/13/2024       CMP RESULTS:  Lab Results   Component Value Date     10/30/2024     03/21/2019    POTASSIUM 4.2 10/30/2024    POTASSIUM 4.0 12/07/2021    POTASSIUM 3.8 03/21/2019    CHLORIDE 105 10/30/2024    CHLORIDE 103 12/07/2021    CHLORIDE 105 03/21/2019    CO2 26 10/30/2024    CO2 25 12/07/2021    CO2 30.9 12/04/2017    ANIONGAP 9 10/30/2024    ANIONGAP 13 12/07/2021     (H) 10/30/2024     12/07/2021    GLC 97 03/21/2019    BUN 13.8 10/30/2024    BUN 11 12/07/2021    BUN 16 03/21/2019    CR 0.99 (H) 10/30/2024    CR 0.86 03/21/2019    GFRESTIMATED 63 10/30/2024    GFRESTIMATED >60 03/21/2019    GFRESTBLACK >60 03/21/2019    JOSEMANUEL 9.5 10/30/2024    JOSEMANUEL 9.8 03/21/2019    BILITOTAL 0.3 12/04/2017    ALKPHOS 80.9 12/04/2017    ALT 22 01/16/2024    ALT 19.5 12/04/2017    AST 13.7 12/04/2017        INR RESULTS:  Lab Results   Component Value Date    INR 1.7 02/01/2018       Lab Results   Component Value Date    MAG 2.1 03/21/2019     No results found for: \"NTBNPI\"  No results found for: \"NTBNP\"    Assessment and Plan:   Pt with HTN and persistent afib on anticoagulation.  Plan is to increase lisinopril from 5 mg to 10 mg every day and obtain TTE.  Will discuss possible Watchman device placement with structural heart team.  Will have pt follow up with AKIN in 3-4 months.      Anastacio Atkinson MD, PhD  Professor, " Heart Failure and Cardiac Transplantation  [facility]     CC  Patient Care Team:  Ari Cruz MD as PCP - General (Family Practice)  Rossana Aguirre MD as MD (Gynecologic Oncology)  Stella Cagle MD (Inactive) as Referring Physician (OB/Gyn)  Rossana Aguirre MD as Assigned Cancer Care Provider  Ari Cruz MD as Assigned PCP  Anastacio Atkinson MD as MD (Cardiovascular Disease)  SELF, REFERRED

## 2025-01-13 NOTE — PATIENT INSTRUCTIONS
Dr. Atkinson recommends:    Increase Lisinopril to 10 MG once daily.    Echocardiogram for the near future.    Follow up clinic visit with general AKIN in 3-4 months for history of a-fib.    Thank you for your visit today.  Please call me with any questions or concerns.   Serg Mcgrath RN  Cardiology Care Coordinator  [phone]

## 2025-01-14 LAB
ATRIAL RATE - MUSE: 119 BPM
DIASTOLIC BLOOD PRESSURE - MUSE: NORMAL MMHG
INTERPRETATION ECG - MUSE: NORMAL
P AXIS - MUSE: NORMAL DEGREES
PR INTERVAL - MUSE: NORMAL MS
QRS DURATION - MUSE: 78 MS
QT - MUSE: 410 MS
QTC - MUSE: 451 MS
R AXIS - MUSE: 27 DEGREES
SYSTOLIC BLOOD PRESSURE - MUSE: NORMAL MMHG
T AXIS - MUSE: -52 DEGREES
VENTRICULAR RATE- MUSE: 73 BPM

## 2025-03-31 ENCOUNTER — TELEPHONE (OUTPATIENT)
Dept: CARDIOLOGY | Facility: CLINIC | Age: 65
End: 2025-03-31
Payer: COMMERCIAL

## 2025-03-31 NOTE — TELEPHONE ENCOUNTER
Left Voicemail (1st Attempt) for the patient to call back and schedule the following:    Appointment type:  rtn cardio   Provider: gen lola   Return date: next available   Specialty phone number: [phone] opt 1   Additional appointment(s) needed: n/a   Additonal Notes: n/a

## 2025-03-31 NOTE — TELEPHONE ENCOUNTER
Spoke with patient about upcoming appointment with Jacy Christensen. Patient canceled appointment due to issues with finances and insurance.

## 2025-07-12 ENCOUNTER — HEALTH MAINTENANCE LETTER (OUTPATIENT)
Age: 65
End: 2025-07-12

## (undated) DEVICE — LINEN TOWEL PACK X6 WHITE 5487

## (undated) DEVICE — NDL 25GA 2"  8881200441

## (undated) DEVICE — PREP DYNA-HEX 4% CHG SCRUB 4OZ BOTTLE MDS098710

## (undated) DEVICE — CATH TRAY FOLEY SURESTEP 16FR W/URNE MTR STLK LATEX A303316A

## (undated) DEVICE — LINEN GOWN XLG 5407

## (undated) DEVICE — SOL NACL 0.9% IRRIG 1000ML BOTTLE 2F7124

## (undated) DEVICE — LINEN TOWEL PACK X5 5464

## (undated) DEVICE — SYR PISTON URETHRAL 60ML 68000

## (undated) DEVICE — GLOVE BIOGEL PI MICRO SZ 6.5 48565

## (undated) DEVICE — WIPES FOLEY CARE SURESTEP PROVON DFC100

## (undated) DEVICE — GLOVE BIOGEL PI MICRO INDICATOR UNDERGLOVE SZ 6.5 48965

## (undated) DEVICE — SOL WATER IRRIG 1000ML BOTTLE 2F7114

## (undated) DEVICE — SUCTION MANIFOLD NEPTUNE 2 SYS 4 PORT 0702-020-000

## (undated) DEVICE — Device

## (undated) DEVICE — PAD CHUX UNDERPAD 23X24" 7136

## (undated) DEVICE — SYR 10ML FINGER CONTROL W/O NDL 309695

## (undated) RX ORDER — CLINDAMYCIN PHOSPHATE 900 MG/50ML
INJECTION, SOLUTION INTRAVENOUS
Status: DISPENSED
Start: 2024-05-13

## (undated) RX ORDER — FENTANYL CITRATE 50 UG/ML
INJECTION, SOLUTION INTRAMUSCULAR; INTRAVENOUS
Status: DISPENSED
Start: 2024-05-13

## (undated) RX ORDER — DEXAMETHASONE SODIUM PHOSPHATE 4 MG/ML
INJECTION, SOLUTION INTRA-ARTICULAR; INTRALESIONAL; INTRAMUSCULAR; INTRAVENOUS; SOFT TISSUE
Status: DISPENSED
Start: 2024-05-13

## (undated) RX ORDER — PROPOFOL 10 MG/ML
INJECTION, EMULSION INTRAVENOUS
Status: DISPENSED
Start: 2024-05-13

## (undated) RX ORDER — FENTANYL CITRATE-0.9 % NACL/PF 10 MCG/ML
PLASTIC BAG, INJECTION (ML) INTRAVENOUS
Status: DISPENSED
Start: 2024-05-13

## (undated) RX ORDER — ONDANSETRON 2 MG/ML
INJECTION INTRAMUSCULAR; INTRAVENOUS
Status: DISPENSED
Start: 2024-05-13